# Patient Record
Sex: FEMALE | Race: WHITE | Employment: FULL TIME | URBAN - METROPOLITAN AREA
[De-identification: names, ages, dates, MRNs, and addresses within clinical notes are randomized per-mention and may not be internally consistent; named-entity substitution may affect disease eponyms.]

---

## 2017-10-26 ENCOUNTER — OFFICE VISIT (OUTPATIENT)
Dept: INTERNAL MEDICINE CLINIC | Age: 27
End: 2017-10-26

## 2017-10-26 DIAGNOSIS — M72.2 PLANTAR FASCIITIS, LEFT: ICD-10-CM

## 2017-10-26 DIAGNOSIS — M79.671 BILATERAL FOOT PAIN: Primary | ICD-10-CM

## 2017-10-26 DIAGNOSIS — M79.89 LEG SWELLING: ICD-10-CM

## 2017-10-26 DIAGNOSIS — M79.672 BILATERAL FOOT PAIN: Primary | ICD-10-CM

## 2017-10-26 DIAGNOSIS — M72.2 PLANTAR FASCIITIS, RIGHT: ICD-10-CM

## 2017-10-26 RX ORDER — TRIAMCINOLONE ACETONIDE 40 MG/ML
40 INJECTION, SUSPENSION INTRA-ARTICULAR; INTRAMUSCULAR ONCE
Qty: 1 ML | Refills: 0
Start: 2017-10-26 | End: 2017-10-26

## 2017-10-26 NOTE — MR AVS SNAPSHOT
Visit Information Date & Time Provider Department Dept. Phone Encounter #  
 10/26/2017  2:45 PM [facility] MD Bo [facility] [phone] [id number] Upcoming Health Maintenance Date Due  
 HPV AGE 9Y-34Y (1 of 3 - Female 3 Dose Series) 3/30/2003 DTaP/Tdap/Td series (1 - Tdap) 3/30/2013 PAP AKA CERVICAL CYTOLOGY 3/30/2013 INFLUENZA AGE 9 TO ADULT 8/1/2017 Allergies as of 10/26/2017  Review Complete On: 10/26/2017 By: [facility] MD Bo  
  
 Severity Noted Reaction Type Reactions Codeine Medium 10/26/2017    Other (comments) Tachycardia Sulfa (Sulfonamide Antibiotics) Medium 10/26/2017    Itching Bactrim [Sulfamethoprim Ds]  04/25/2011   Intolerance Itching Chocolate Flavor  07/19/2012    Other (comments) Pt reports migraines Clindamycin  05/17/2012    Other (comments) Chest tightness Compazine [Prochlorperazine Edisylate]  04/25/2011   Intolerance Other (comments) Neck and jaw spasm  And difficultly swallow Peanut  02/28/2013    Other (comments)  
 migraines Reglan [Metoclopramide]  04/25/2011   Intolerance Nausea and Vomiting, Vertigo Sleep disorder and shaking Zofran [Ondansetron Hcl (Pf)]  04/21/2015    Hives Current Immunizations  Reviewed on 7/19/2012 No immunizations on file. Not reviewed this visit You Were Diagnosed With   
  
 Codes Comments Bilateral foot pain    -  Primary ICD-10-CM: M79.671, T00.111 ICD-9-CM: 729.5 Leg swelling     ICD-10-CM: M79.89 ICD-9-CM: 729.81 Plantar fasciitis, left     ICD-10-CM: M72.2 ICD-9-CM: 728.71 Plantar fasciitis, right     ICD-10-CM: M72.2 ICD-9-CM: 728.71 Vitals OB Status Smoking Status Implant Never Smoker Preferred Pharmacy Pharmacy Name Phone [facility] [address], [address], [address] [phone] Your Updated Medication List  
  
   
 This list is accurate as of: 10/26/17  3:46 PM.  Always use your most recent med list.  
  
  
  
  
 ALEVE 220 mg tablet Generic drug:  naproxen sodium Take 220 mg by mouth. 500 MG PRN for back pain CRYSELLE (28) 0.3-30 mg-mcg Tab Generic drug:  norgestrel-ethinyl estradiol Diclofenac Potassium 50 mg Pwpk Commonly known as:  CAMBIA Take 50 mg by mouth as needed for Pain (migraine) for up to 4 doses. oxyCODONE-acetaminophen 5-325 mg per tablet Commonly known as:  PERCOCET Take 1 Tab by mouth every six (6) hours as needed for Pain. Max Daily Amount: 4 Tabs. * promethazine 25 mg tablet Commonly known as:  PHENERGAN Take 1 Tab by mouth every six (6) hours as needed. * promethazine 25 mg tablet Commonly known as:  PHENERGAN Take 1 Tab by mouth every six (6) hours as needed. tiZANidine 4 mg tablet Commonly known as:  Yane Muscat Take 4 mg by mouth every six (6) hours as needed. 1-2 tabs QHS prn for back  pain  
  
 traMADol 50 mg tablet Commonly known as:  ULTRAM  
Take 1 Tab by mouth every six (6) hours as needed for Pain. Max Daily Amount: 200 mg.  
  
 triamcinolone acetonide 40 mg/mL injection Commonly known as:  KENALOG  
1 mL by IntraMUSCular route once for 1 dose. * Notice: This list has 2 medication(s) that are the same as other medications prescribed for you. Read the directions carefully, and ask your doctor or other care provider to review them with you. We Performed the Following AMB POC US, SONO GUIDE NEEDLE [id number] CPT(R)] NC INJECT TENDON ORIGIN/INSERT [id number] CPT(R)] REFERRAL TO PHYSICAL THERAPY [[facility] [employer]] TRIAMCINOLONE ACETONIDE INJ [ [facility] [place]] To-Do List   
 10/26/2017 Imaging:  DUPLEX LOWER EXT VENOUS BILAT   
  
 10/26/2017 Imaging:  XR FOOT LT MIN 3 V   
  
 10/26/2017 Imaging:  XR FOOT RT MIN 3 V Referral Information Referral ID Referred By Referred To [id number]   
   [address] [facility], [address] Phone: [phone] Fax: [phone] Visits Status Start Date End Date  
 20 New Request 10/26/17 10/26/18 If your referral has a status of pending review or denied, additional information will be sent to support the outcome of this decision. Introducing [facility] HE[facility]! Mark Prabhakar introduces [facility] patient portal. Now you can access parts of your medical record, email your doctor's office, and request medication refills online. 1. In your internet browser, go to [email or web address] 2. Click on the First Time User? Click Here link in the Sign In box. You will see the New Member Sign Up page. 3. Enter your [facility] Access Code exactly as it appears below. You will not need to use this code after youve completed the sign-up process. If you do not sign up before the expiration date, you must request a new code. · [facility] Access Code: [email or web address] Expires: 1/24/2018  3:46 PM 
 
4. Enter the last four digits of your Social Security Number (xxxx) and Date of Birth (mm/dd/yyyy) as indicated and click Submit. You will be taken to the next sign-up page. 5. Create a [facility] ID. This will be your [facility] login ID and cannot be changed, so think of one that is secure and easy to remember. 6. Create a [facility] password. You can change your password at any time. 7. Enter your Password Reset Question and Answer. This can be used at a later time if you forget your password. 8. Enter your e-mail address. You will receive e-mail notification when new information is available in [address] 9. Click Sign Up. You can now view and download portions of your medical record. 10. Click the Download Summary menu link to download a portable copy of your medical information.  
 
If you have questions, please visit the Frequently Asked Questions section of the [employer]. Remember, [employer] is NOT to be used for urgent needs. For medical emergencies, dial 911. Now available from your iPhone and Android! Please provide this summary of care documentation to your next provider. Your primary care clinician is listed as Nikunj Piedra. If you have any questions after today's visit, please call [phone].

## 2017-10-26 NOTE — PROGRESS NOTES
Chief Complaint   Patient presents with    Foot Pain     bilateral foot px for two months     she is a 24y.o. year old female who presents for evaluation of Bilateral foot px  Pain Assessment Encounter      Jameel Garcia  10/26/2017  Onset of Symptoms: 2 months  ________________________________________________________________________  Description:sharp    Frequency: twice a day  Pain Scale:(1-10): 9  Trauma Hx: None  Hx of similar symptoms: No:   Radiation: None  Duration:  continuous      Progression: is unchanged  What makes it better?: Nothing  What makes it worse?:WALKING   Medications tried: ibuprofen    Reviewed and agree with Nurse Note and duplicated in this note. Reviewed PmHx, RxHx, FmHx, SocHx, AllgHx and updated and dated in the chart. Family History   Problem Relation Age of Onset    Hypertension Father     Diabetes Maternal Grandmother     Coronary Artery Disease Maternal Grandfather     Diabetes Maternal Grandfather     Diabetes Paternal Grandfather        Past Medical History:   Diagnosis Date    Headache(784.0)     Seizures ([address]       Social History     Social History    Marital status: SINGLE     Spouse name: N/A    Number of children: N/A    Years of education: N/A     Social History Main Topics    Smoking status: Never Smoker    Smokeless tobacco: Never Used    Alcohol use No    Drug use: No    Sexual activity: Not Currently     Other Topics Concern    Not on file     Social History Narrative        Review of Systems - negative except as listed above      Objective: There were no vitals filed for this visit. Physical Examination: General appearance - alert, well appearing, and in no distress  Back exam - full range of motion, no tenderness, palpable spasm or pain on motion  Neurological - alert, oriented, normal speech, no focal findings or movement disorder noted  Musculoskeletal - A bilateral ankle exam was performed.   Swelling: none  Warmth: no warmth  Tenderness: severe at PF origin    Palpation:  ATFL:  non-tender  CFL:  non-tender  PTFL:  non-tender  Syndesmosis Ligament:  non-tender  Deltoid ligament:  non-tender  Posterior Tibialis Tendon:  non-tender  Peroneal Tendon: non-tender  Achilles Tendon:  tender     Proximal Fibula:  non-tender  Proximal Tibia:  non-tender  Distal Fibula:  non-tender  Distal Tibia:  non-tender    Plantar Fascia: tender  Midfoot:  non-tender  Forefoot:  non-tender    ROM:  Plantar Flexion:  normal  Dorsiflexion:  normal    Squeeze Test: negative  Talar Tilt Test:  negative  Anterior Drawer:negative    Kleiger Test:  negative  Hop Test: negative  Adak: negative      Extremities - peripheral pulses normal, no pedal edema, no clubbing or cyanosis  Skin - normal coloration and turgor, no rashes, no suspicious skin lesions noted  Time Out taken at:  3:49 PM  10/26/2017    * Patient was identified by name and date of birth   * Agreement on procedure being performed was verified  * Risks and Benefits explained to the patient  * Procedure site verified and marked as necessary  * Patient was positioned for comfort  * Consent was signed and verified   In the presence of: Witness: ROXANA Pradhan  Injection #: 1  Needle:  25 gauge  Procedure: This procedure was discussed with Mitra Serrano and other therapeutic options were considered (risks vs benefits). Mitra Serrano and I thought that an injection was merited. After informed consent was obtained, landmarks were identified(marked), and the right ligament  was cleansed with ChlorPrep in the standard sterile manner. 2mL  1% lidocaine  and  .5 mL Kenalog  was then injected and needle tenotomy was not performed. Procedure performed with ultrasound needle guidance. The needle was then withdrawn. T he procedure was well tolerated. The patient is asked to continue to rest the area for a few more days before resuming regular activities.   It may be more painful for the first 1-2 days. NSAIDS are to be avoided. Watch for fever, or increased swelling or persistent pain in the joint. Call or return to clinic prn if such symptoms occur or there is failure to improve as anticipated. The procedure did provide relief of symptoms in the clinic. RTC in 4 weeks for reevaluation and possible reinjection. Given the patient's body habitus and the anatomically deep nature of this structure, sonographic guidance is recommended to prevent injury to neurovascular structures and confirm accuracy of injection. Furthermore, this patient has failed conservative treatment with physical therapy and modalities and the diagnostic and therapeutic accuracy is important. Assessment/ Plan:   Diagnoses and all orders for this visit:    1. Bilateral foot pain  -     XR FOOT LT MIN 3 V; Future  -     XR FOOT RT MIN 3 V; Future  -     MRI FOOT LT WO CONT; Future  -     MRI FOOT RT WO CONT; Future    2. Leg swelling  -     DUPLEX LOWER EXT VENOUS BILAT; Future    3. Plantar fasciitis, left  -     REFERRAL TO PHYSICAL THERAPY  -     TRIAMCINOLONE ACETONIDE INJ  -     triamcinolone acetonide (KENALOG) 40 mg/mL injection; 1 mL by IntraMUSCular route once for 1 dose.   -     20551 - INJECT TENDON ORIGIN/INSERT  -     AMB POC US, SONO GUIDE NEEDLE    4. Plantar fasciitis, right  -     REFERRAL TO PHYSICAL THERAPY    MRI to rule out stress fracture of the calcaneus    Pathophysiology, recovery and rehabilitation process discussed and questions answered   Counseling for 30 Minutes of the total visit duration   Pictures and figures used as necessary   Provided reassurance   Monitor response to injection   Discussed steroid side effects of fat atrophy, hypopigmentation, steroid flare or infection   Recommend  lower impact activities-walking, Eliptical, Nordic Track, cycling or swimming   Follow up in 4 week(s)  Xray's reviewed - within normal limits     :  1) Remember to stay active and/or exercise regularly (I suggest 30-45 minutes daily)   2) For reliable dietary information, go to www. EATRIGHT.org. You may wish to consider seeing the nutritionist at [facility] [phone], also consider the [address] I have discussed the diagnosis with the patient and the intended plan as seen in the above orders. The patient has received an after-visit summary and questions were answered concerning future plans. Medication Side Effects and Warnings were discussed with patient: yes  Patient Labs were reviewed and or requested: yes  Patient Past Records were reviewed and or requested  yes  I have discussed the diagnosis with the patient and the intended plan as seen in the above orders. The patient has received an after-visit summary and questions were answered concerning future plans. Pt agrees to call or return to clinic and/or go to closest ER with any worsening of symptoms. This may include, but not limited to increased fever (>100.4) with NSAIDS or Tylenol, increased edema, confusion, rash, worsening of presenting symptoms.

## 2017-10-30 ENCOUNTER — OFFICE VISIT (OUTPATIENT)
Dept: INTERNAL MEDICINE CLINIC | Age: 27
End: 2017-10-30

## 2017-10-30 VITALS
HEIGHT: 66 IN | BODY MASS INDEX: 36.71 KG/M2 | WEIGHT: 228.4 LBS | DIASTOLIC BLOOD PRESSURE: 89 MMHG | SYSTOLIC BLOOD PRESSURE: 132 MMHG | OXYGEN SATURATION: 97 % | HEART RATE: 95 BPM | RESPIRATION RATE: 16 BRPM | TEMPERATURE: 98.4 F

## 2017-10-30 DIAGNOSIS — M72.2 PLANTAR FASCIITIS, RIGHT: Primary | ICD-10-CM

## 2017-10-30 RX ORDER — MELOXICAM 15 MG/1
15 TABLET ORAL DAILY
Qty: 15 TAB | Refills: 0 | Status: SHIPPED | OUTPATIENT
Start: 2017-10-30 | End: 2018-07-08

## 2017-10-30 NOTE — PROGRESS NOTES
Chief Complaint   Patient presents with    Foot Pain     she is a 24y.o. year old female who presents for follow up of injury. Follow Up Pain Assessment Encounter      Onset of Symptoms: 5 days  ________________________________________________________________________  Description: Pain is moderately worse      Pain Scale:(1-10): 9  Duration:  continuous  What makes it better?: rest  What makes it worse?:walking  Medications tried: None  Modalities tried: None        Reviewed and agree with Nurse Note and duplicated in this note. Reviewed PmHx, RxHx, FmHx, SocHx, AllgHx and updated and dated in the chart. Family History   Problem Relation Age of Onset    Hypertension Father     Diabetes Maternal Grandmother     Coronary Artery Disease Maternal Grandfather     Diabetes Maternal Grandfather     Diabetes Paternal Grandfather        Past Medical History:   Diagnosis Date    Headache(784.0)     Seizures ([address]       Social History     Social History    Marital status: SINGLE     Spouse name: N/A    Number of children: N/A    Years of education: N/A     Social History Main Topics    Smoking status: Never Smoker    Smokeless tobacco: Never Used    Alcohol use No    Drug use: No    Sexual activity: Not Currently     Other Topics Concern    None     Social History Narrative        Review of Systems - negative except as listed above      Objective:     Vitals:    10/30/17 1619   BP: 132/89   Pulse: 95   Resp: 16   Temp: 98.4 °F (36.9 °C)   TempSrc: Oral   SpO2: 97%   Weight: 228 lb 6.4 oz (103.6 kg)   Height: 5' 6\" (1.676 m)       Physical Examination: General appearance - alert, well appearing, and in no distress  Back exam - full range of motion, no tenderness, palpable spasm or pain on motion  Neurological - alert, oriented, normal speech, no focal findings or movement disorder noted  Musculoskeletal - A bilateral ankle exam was performed.   Swelling: none  Warmth: no warmth  Tenderness: severe at PF origin    Palpation:  ATFL:  non-tender  CFL:  non-tender  PTFL:  non-tender  Syndesmosis Ligament:  non-tender  Deltoid ligament:  non-tender  Posterior Tibialis Tendon:  non-tender  Peroneal Tendon: non-tender  Achilles Tendon:  tender     Proximal Fibula:  non-tender  Proximal Tibia:  non-tender  Distal Fibula:  non-tender  Distal Tibia:  non-tender    Plantar Fascia: tender  Midfoot:  non-tender  Forefoot:  non-tender    ROM:  Plantar Flexion:  normal  Dorsiflexion:  normal    Squeeze Test: negative  Talar Tilt Test:  negative  Anterior Drawer:negative    Kleiger Test:  negative  Hop Test: negative  Canyon: negative  No ecchymosis, erythema or swelling noted on exam of left heel    Extremities - peripheral pulses normal, no pedal edema, no clubbing or cyanosis  Skin - normal coloration and turgor, no rashes, no suspicious skin lesions noted    Assessment/ Plan:   Diagnoses and all orders for this visit:    1. Plantar fasciitis, right    Other orders  -     meloxicam (MOBIC) 15 mg tablet; Take 1 Tab by mouth daily. Patient put on crutches until her MRI on Wednesday  Unusual reaction to the steroid shot given her amount of pain. Follow-up Disposition:  Return if symptoms worsen or fail to improve. Pathophysiology, recovery and rehabilitation process discussed and questions answered   Counseling for 30 Minutes of the total visit duration   Pictures and figures used as necessary   Provided reassurance   Monitor response to Physical Therapy   Recommend activity modification   Recommend  lower impact activities-walking, Eliptical, Nordic Track, cycling or swimming   Follow up in 4 week(s)      1) Remember to stay active and/or exercise regularly (I suggest 30-45 minutes daily)   2) For reliable dietary information, go to www. EATRIGHT.org. You may wish to consider seeing the nutritionist at [facility] [phone], also consider the [address]   I have discussed the diagnosis with the patient and the intended plan as seen in the above orders. The patient has received an after-visit summary and questions were answered concerning future plans. Medication Side Effects and Warnings were discussed with patient: yes  Patient Labs were reviewed and or requested: yes  Patient Past Records were reviewed and or requested  yes  I have discussed the diagnosis with the patient and the intended plan as seen in the above orders. The patient has received an after-visit summary and questions were answered concerning future plans. Pt agrees to call or return to clinic and/or go to closest ER with any worsening of symptoms. This may include, but not limited to increased fever (>100.4) with NSAIDS or Tylenol, increased edema, confusion, rash, worsening of presenting symptoms.

## 2017-10-30 NOTE — MR AVS SNAPSHOT
Visit Information Date & Time Provider Department Dept. Phone Encounter #  
 10/30/2017  4:30 PM Kait Wilburn MD [facility] [place] [address] [id number] Follow-up Instructions Return if symptoms worsen or fail to improve. Follow-up and Disposition History Upcoming Health Maintenance Date Due  
 HPV AGE 9Y-34Y (1 of 3 - Female 3 Dose Series) 3/30/2003 DTaP/Tdap/Td series (1 - Tdap) 3/30/2013 PAP AKA CERVICAL CYTOLOGY 3/30/2013 INFLUENZA AGE 9 TO ADULT 8/1/2017 Allergies as of 10/30/2017  Review Complete On: 10/30/2017 By: Kait Wilburn MD  
  
 Severity Noted Reaction Type Reactions Codeine Medium 10/26/2017    Other (comments) Tachycardia Sulfa (Sulfonamide Antibiotics) Medium 10/26/2017    Itching Bactrim [Sulfamethoprim Ds]  04/25/2011   Intolerance Itching Chocolate Flavor  07/19/2012    Other (comments) Pt reports migraines Clindamycin  05/17/2012    Other (comments) Chest tightness Compazine [Prochlorperazine Edisylate]  04/25/2011   Intolerance Other (comments) Neck and jaw spasm  And difficultly swallow Morphine  06/19/2017    Hives Peanut  02/28/2013    Other (comments)  
 migraines Reglan [Metoclopramide]  04/25/2011   Intolerance Nausea and Vomiting, Vertigo Sleep disorder and shaking Sulfur  06/19/2017    Swelling Throat closes. Zofran [Ondansetron Hcl (Pf)]  04/21/2015    Hives Metaxalone Low 06/19/2017    Palpitations Current Immunizations  Reviewed on 7/19/2012 No immunizations on file. Not reviewed this visit You Were Diagnosed With   
  
 Codes Comments Plantar fasciitis, right    -  Primary ICD-10-CM: M72.2 ICD-9-CM: 728.71 Vitals BP Pulse Temp Resp Height(growth percentile) Weight(growth percentile) 132/89 (BP 1 Location: Left arm, BP Patient Position: Sitting) 95 98.4 °F (36.9 °C) (Oral) 16 5' 6\" (1.676 m) 228 lb 6.4 oz (103.6 kg) SpO2 BMI OB Status Smoking Status 97% 36.86 kg/m2 Implant Never Smoker Vitals History BMI and BSA Data Body Mass Index Body Surface Area  
 36.86 kg/m 2 2.2 m 2 Preferred Pharmacy Pharmacy Name Phone [facility] [address], [address] [place] [address] [phone] Your Updated Medication List  
  
   
This list is accurate as of: 10/30/17  4:49 PM.  Always use your most recent med list.  
  
  
  
  
 ALEVE 220 mg tablet Generic drug:  naproxen sodium Take 220 mg by mouth. 500 MG PRN for back pain CRYSELLE (28) 0.3-30 mg-mcg Tab Generic drug:  norgestrel-ethinyl estradiol Diclofenac Potassium 50 mg Pwpk Commonly known as:  CAMBIA Take 50 mg by mouth as needed for Pain (migraine) for up to 4 doses. meloxicam 15 mg tablet Commonly known as:  MOBIC Take 1 Tab by mouth daily. NEXPLANON 68 mg Impl Generic drug:  etonogestrel  
by SubDERmal route. oxyCODONE-acetaminophen 5-325 mg per tablet Commonly known as:  PERCOCET Take 1 Tab by mouth every six (6) hours as needed for Pain. Max Daily Amount: 4 Tabs. promethazine 25 mg tablet Commonly known as:  PHENERGAN Take 1 Tab by mouth every six (6) hours as needed. tiZANidine 4 mg tablet Commonly known as:  Elenore Piggs Take 4 mg by mouth every six (6) hours as needed. 1-2 tabs QHS prn for back  pain  
  
 traMADol 50 mg tablet Commonly known as:  ULTRAM  
Take 1 Tab by mouth every six (6) hours as needed for Pain. Max Daily Amount: 200 mg. Prescriptions Sent to Pharmacy Refills  
 meloxicam (MOBIC) 15 mg tablet 0 Sig: Take 1 Tab by mouth daily. Class: Normal  
 Pharmacy: [address], [address] [place] [address] #: [phone] Route: Oral  
  
Follow-up Instructions Return if symptoms worsen or fail to improve.   
  
To-Do List   
 11/01/2017 6:45 AM  
 Appointment with [facility] MRI 1 at [facility] ([phone]) 1. Please bring a list or a bag of your current medications to your appointment 2. Please be sure to remove ALL hair clips, pins, extensions, etc., prior to arriving for your MRI procedure. 3. If you have any medical implants or devices, please bring associated medical card with you. 4. Bring any non [facility] films or CDs pertaining to the area being imaged with you on the day of appointment. 5. A written order with a valid diagnosis and Physicians  signature is required for all scheduled tests. 6. Check in at registration 30min before your appointment time unless you were instructed to do otherwise. 11/01/2017 7:15 AM  
  Appointment with [facility] MRI 1 at [facility] ([phone]) 1. Please bring a list or a bag of your current medications to your appointment 2. Please be sure to remove ALL hair clips, pins, extensions, etc., prior to arriving for your MRI procedure. 3. If you have any medical implants or devices, please bring associated medical card with you. 4. Bring any non [facility] films or CDs pertaining to the area being imaged with you on the day of appointment. 5. A written order with a valid diagnosis and Physicians  signature is required for all scheduled tests. 6. Check in at registration 30min before your appointment time unless you were instructed to do otherwise. 11/04/2017 9:30 AM  
  Appointment with [facility] ROOM 1 [facility] at [facility] V[facility]ULAR [facility] ([phone]) No Prep  GENERAL INSTRUCTIONS 1. Bring any non [facility] facility films/reports pertaining to the area being studied with you on the day of appointment. 2. A written order with a valid diagnosis and Physicians signature is required for all scheduled tests. 3. Check in at registration 30 minutes before your appointment time unless you were instructed to do otherwise. Introducing [facility]! Vargas Brewer introduces [employer] patient portal. Now you can access parts of your medical record, email your doctor's office, and request medication refills online. 1. In your internet browser, go to [email or web address] 2. Click on the First Time User? Click Here link in the Sign In box. You will see the New Member Sign Up page. 3. Enter your [employer] Access Code exactly as it appears below. You will not need to use this code after youve completed the sign-up process. If you do not sign up before the expiration date, you must request a new code. · [employer] Access Code: [email or web address] Expires: 1/24/2018  3:46 PM 
 
4. Enter the last four digits of your Social Security Number (xxxx) and Date of Birth (mm/dd/yyyy) as indicated and click Submit. You will be taken to the next sign-up page. 5. Create a [employer] ID. This will be your [employer] login ID and cannot be changed, so think of one that is secure and easy to remember. 6. Create a [employer] password. You can change your password at any time. 7. Enter your Password Reset Question and Answer. This can be used at a later time if you forget your password. 8. Enter your e-mail address. You will receive e-mail notification when new information is available in [address] 9. Click Sign Up. You can now view and download portions of your medical record. 10. Click the Download Summary menu link to download a portable copy of your medical information. If you have questions, please visit the Frequently Asked Questions section of the [employer] website. Remember, [employer] is NOT to be used for urgent needs. For medical emergencies, dial 911. Now available from your iPhone and Android! Please provide this summary of care documentation to your next provider. Your primary care clinician is listed as Kyler Banks. If you have any questions after today's visit, please call [phone].

## 2017-11-01 ENCOUNTER — TELEPHONE (OUTPATIENT)
Dept: INTERNAL MEDICINE CLINIC | Age: 27
End: 2017-11-01

## 2017-11-01 ENCOUNTER — HOSPITAL ENCOUNTER (OUTPATIENT)
Dept: MRI IMAGING | Age: 27
Discharge: HOME | End: 2017-11-01
Attending: FAMILY MEDICINE
Payer: COMMERCIAL

## 2017-11-01 DIAGNOSIS — M79.671 BILATERAL FOOT PAIN: ICD-10-CM

## 2017-11-01 DIAGNOSIS — M79.672 BILATERAL FOOT PAIN: ICD-10-CM

## 2017-11-01 PROCEDURE — 73718 MRI LOWER EXTREMITY W/O DYE: CPT

## 2017-11-02 NOTE — PROGRESS NOTES
Pt notified of results. Finding of os trigonum is not where her pain is located. Normal results otherwise.   rb

## 2017-11-04 ENCOUNTER — HOSPITAL ENCOUNTER (OUTPATIENT)
Dept: VASCULAR SURGERY | Age: 27
Discharge: HOME | End: 2017-11-04
Attending: FAMILY MEDICINE
Payer: COMMERCIAL

## 2017-11-04 DIAGNOSIS — M79.89 LEG SWELLING: ICD-10-CM

## 2017-11-04 PROCEDURE — 93970 EXTREMITY STUDY: CPT

## 2017-11-04 NOTE — PROCEDURES
[address]  *** FINAL REPORT ***    Name: Emilie Perales  MRN: [id number]    Outpatient  : 30 Mar 1990  HIS Order #: [id number]  [address] [facility] Visit #: [id number]  Date: 2017    TYPE OF TEST: Peripheral Venous Testing    REASON FOR TEST  Limb swelling    Right Leg:-  Deep venous thrombosis:           No  Superficial venous thrombosis:    No  Deep venous insufficiency:        No  Superficial venous insufficiency: No    Left Leg:-  Deep venous thrombosis:           No  Superficial venous thrombosis:    No  Deep venous insufficiency:        No  Superficial venous insufficiency: No      INTERPRETATION/FINDINGS  PROCEDURE:  BILATERAL LE VENOUS DUPLEX. Evaluation of lower extremity veins with ultrasound (B-mode imaging,  pulsed Doppler, color Doppler). Includes the common femoral, deep  femoral, femoral, popliteal, posterior tibial, peroneal, and great  saphenous veins. FINDINGS:  Miguel A Cower scale and color flow duplex images of the veins in  both lower extremities demonstrate normal compressibility, spontaneous   and augmented flow profiles, and absence of filling defects  throughout the deep and superficial veins in both lower extremities. CONCLUSION:  Bilateral lower extremity venous duplex negative for deep   venous thrombosis or thrombophlebitis. ADDITIONAL COMMENTS    I have personally reviewed the data relevant to the interpretation of  this  study. TECHNOLOGIST: DENIS Sanchez  Signed: 2017 10:06 AM    PHYSICIAN: Amanda Callahan.  Howard Haddad MD  Signed: 2017 09:21 AM

## 2018-04-27 ENCOUNTER — HOSPITAL ENCOUNTER (EMERGENCY)
Age: 28
Discharge: HOME | End: 2018-04-27
Attending: EMERGENCY MEDICINE
Payer: COMMERCIAL

## 2018-04-27 ENCOUNTER — OFFICE VISIT (OUTPATIENT)
Dept: NEUROLOGY | Age: 28
End: 2018-04-27

## 2018-04-27 ENCOUNTER — APPOINTMENT (OUTPATIENT)
Dept: CT IMAGING | Age: 28
End: 2018-04-27
Attending: PHYSICIAN ASSISTANT
Payer: COMMERCIAL

## 2018-04-27 VITALS
SYSTOLIC BLOOD PRESSURE: 144 MMHG | HEART RATE: 89 BPM | RESPIRATION RATE: 16 BRPM | HEIGHT: 67 IN | WEIGHT: 246.91 LBS | TEMPERATURE: 98.2 F | BODY MASS INDEX: 38.75 KG/M2 | DIASTOLIC BLOOD PRESSURE: 96 MMHG | OXYGEN SATURATION: 96 %

## 2018-04-27 VITALS
SYSTOLIC BLOOD PRESSURE: 140 MMHG | WEIGHT: 251 LBS | DIASTOLIC BLOOD PRESSURE: 90 MMHG | HEART RATE: 100 BPM | BODY MASS INDEX: 39.39 KG/M2 | RESPIRATION RATE: 14 BRPM | HEIGHT: 67 IN | OXYGEN SATURATION: 99 %

## 2018-04-27 DIAGNOSIS — G43.809 OTHER MIGRAINE WITHOUT STATUS MIGRAINOSUS, NOT INTRACTABLE: Primary | ICD-10-CM

## 2018-04-27 DIAGNOSIS — G43.709 CHRONIC MIGRAINE WITHOUT AURA WITHOUT STATUS MIGRAINOSUS, NOT INTRACTABLE: Primary | ICD-10-CM

## 2018-04-27 LAB — HCG UR QL: NEGATIVE

## 2018-04-27 PROCEDURE — 81025 URINE PREGNANCY TEST: CPT

## 2018-04-27 PROCEDURE — 74011250636 HC RX REV CODE- 250/636: Performed by: PHYSICIAN ASSISTANT

## 2018-04-27 PROCEDURE — 96375 TX/PRO/DX INJ NEW DRUG ADDON: CPT

## 2018-04-27 PROCEDURE — 99285 EMERGENCY DEPT VISIT HI MDM: CPT

## 2018-04-27 PROCEDURE — 96361 HYDRATE IV INFUSION ADD-ON: CPT

## 2018-04-27 PROCEDURE — 74011250637 HC RX REV CODE- 250/637: Performed by: PHYSICIAN ASSISTANT

## 2018-04-27 PROCEDURE — 96365 THER/PROPH/DIAG IV INF INIT: CPT

## 2018-04-27 PROCEDURE — 70450 CT HEAD/BRAIN W/O DYE: CPT

## 2018-04-27 PROCEDURE — 74011250636 HC RX REV CODE- 250/636: Performed by: EMERGENCY MEDICINE

## 2018-04-27 RX ORDER — PROMETHAZINE HYDROCHLORIDE 25 MG/1
25 TABLET ORAL
Qty: 12 TAB | Refills: 0 | Status: SHIPPED | OUTPATIENT
Start: 2018-04-27 | End: 2018-07-08

## 2018-04-27 RX ORDER — DROPERIDOL 2.5 MG/ML
1.25 INJECTION, SOLUTION INTRAMUSCULAR; INTRAVENOUS ONCE
Status: DISCONTINUED | OUTPATIENT
Start: 2018-04-27 | End: 2018-04-27

## 2018-04-27 RX ORDER — DIAZEPAM 5 MG/1
5 TABLET ORAL ONCE
Status: COMPLETED | OUTPATIENT
Start: 2018-04-27 | End: 2018-04-27

## 2018-04-27 RX ORDER — KETOROLAC TROMETHAMINE 30 MG/ML
30 INJECTION, SOLUTION INTRAMUSCULAR; INTRAVENOUS
Status: COMPLETED | OUTPATIENT
Start: 2018-04-27 | End: 2018-04-27

## 2018-04-27 RX ORDER — PROMETHAZINE HYDROCHLORIDE 25 MG/1
25 SUPPOSITORY RECTAL
Qty: 12 SUPPOSITORY | Refills: 0 | Status: SHIPPED | OUTPATIENT
Start: 2018-04-27 | End: 2018-05-04

## 2018-04-27 RX ORDER — KETOROLAC TROMETHAMINE 10 MG/1
10 TABLET, FILM COATED ORAL
Qty: 15 TAB | Refills: 1 | Status: SHIPPED | OUTPATIENT
Start: 2018-04-27 | End: 2018-07-08

## 2018-04-27 RX ORDER — DIPHENHYDRAMINE HYDROCHLORIDE 50 MG/ML
25 INJECTION, SOLUTION INTRAMUSCULAR; INTRAVENOUS
Status: COMPLETED | OUTPATIENT
Start: 2018-04-27 | End: 2018-04-27

## 2018-04-27 RX ORDER — DEXAMETHASONE SODIUM PHOSPHATE 10 MG/ML
10 INJECTION INTRAMUSCULAR; INTRAVENOUS
Status: COMPLETED | OUTPATIENT
Start: 2018-04-27 | End: 2018-04-27

## 2018-04-27 RX ORDER — FLUOXETINE HYDROCHLORIDE 20 MG/1
20 CAPSULE ORAL
COMMUNITY

## 2018-04-27 RX ORDER — MAGNESIUM SULFATE HEPTAHYDRATE 40 MG/ML
2 INJECTION, SOLUTION INTRAVENOUS ONCE
Status: COMPLETED | OUTPATIENT
Start: 2018-04-27 | End: 2018-04-27

## 2018-04-27 RX ORDER — DIAZEPAM 10 MG/2ML
5 INJECTION INTRAMUSCULAR
Status: DISCONTINUED | OUTPATIENT
Start: 2018-04-27 | End: 2018-04-27 | Stop reason: CLARIF

## 2018-04-27 RX ORDER — DIHYDROERGOTAMINE MESYLATE 1 MG/ML
0.5 INJECTION, SOLUTION INTRAMUSCULAR; INTRAVENOUS; SUBCUTANEOUS ONCE
Status: COMPLETED | OUTPATIENT
Start: 2018-04-27 | End: 2018-04-27

## 2018-04-27 RX ADMIN — PROMETHAZINE HYDROCHLORIDE 12.5 MG: 25 INJECTION INTRAMUSCULAR; INTRAVENOUS at 09:25

## 2018-04-27 RX ADMIN — DEXAMETHASONE SODIUM PHOSPHATE 10 MG: 10 INJECTION, SOLUTION INTRAMUSCULAR; INTRAVENOUS at 08:58

## 2018-04-27 RX ADMIN — SODIUM CHLORIDE 1000 ML: 900 INJECTION, SOLUTION INTRAVENOUS at 08:49

## 2018-04-27 RX ADMIN — DIAZEPAM 5 MG: 5 TABLET ORAL at 10:34

## 2018-04-27 RX ADMIN — SODIUM CHLORIDE 1000 ML: 900 INJECTION, SOLUTION INTRAVENOUS at 11:39

## 2018-04-27 RX ADMIN — SODIUM CHLORIDE 1000 ML: 900 INJECTION, SOLUTION INTRAVENOUS at 10:03

## 2018-04-27 RX ADMIN — MAGNESIUM SULFATE HEPTAHYDRATE 2 G: 40 INJECTION, SOLUTION INTRAVENOUS at 10:04

## 2018-04-27 RX ADMIN — DIPHENHYDRAMINE HYDROCHLORIDE 25 MG: 50 INJECTION, SOLUTION INTRAMUSCULAR; INTRAVENOUS at 11:44

## 2018-04-27 RX ADMIN — DIHYDROERGOTAMINE MESYLATE 0.5 MG: 1 INJECTION, SOLUTION INTRAMUSCULAR; INTRAVENOUS; SUBCUTANEOUS at 11:25

## 2018-04-27 RX ADMIN — KETOROLAC TROMETHAMINE 30 MG: 30 INJECTION, SOLUTION INTRAMUSCULAR at 08:55

## 2018-04-27 NOTE — ED NOTES
The patient states no relief of headache pain. She appears to have stopped crying is is calmer at this time. Family remains at the bedside.

## 2018-04-27 NOTE — ED PROVIDER NOTES
HPI Comments: 34 y.o. female with past medical history significant for seizures and headaches who presents from home via private vehicle with chief complaint of headache. Pt reports a 5 day history of an intermittent headache, currently a 10/10 described as an \"ache\". Pt reports a history of migraines, notes this one has lasted longer than her usual, and has not been relieved by her normal migraine medications at home, including toradol, phenergan, valium, and demerol. Pt denies any exacerbating or relieving factors. Pt denies any facial asymmetry, ataxia, unilateral weakness, or speech difficulty. There are no other acute medical concerns at this time. Social hx: Nonsmoker; No EtOH use  PCP: Kacy Hawkins NP    Note written by Selina Rosario, as dictated by Jazmine Capps PA-C 8:36 AM      The history is provided by the patient. No  was used. Past Medical History:   Diagnosis Date    Headache(784.0)     Seizures ([address]        Past Surgical History:   Procedure Laterality Date    ENDOSCOPY, COLON, DIAGNOSTIC      HX APPENDECTOMY      HX CHOLECYSTECTOMY      HX GI      HX GYN           Family History:   Problem Relation Age of Onset    Hypertension Father     Diabetes Maternal Grandmother     Coronary Artery Disease Maternal Grandfather     Diabetes Maternal Grandfather     Diabetes Paternal Grandfather        Social History     Social History    Marital status: SINGLE     Spouse name: N/A    Number of children: N/A    Years of education: N/A     Occupational History    Not on file. Social History Main Topics    Smoking status: Never Smoker    Smokeless tobacco: Never Used    Alcohol use No    Drug use: No    Sexual activity: Not Currently     Other Topics Concern    Not on file     Social History Narrative         ALLERGIES: Codeine; Sulfa (sulfonamide antibiotics); Bactrim [sulfamethoprim ds];  Chocolate flavor; Clindamycin; Compazine [prochlorperazine edisylate]; Morphine; Peanut; Reglan [metoclopramide]; Sulfur; Zofran [ondansetron hcl (pf)]; and Metaxalone    Review of Systems   Constitutional: Negative for activity change, appetite change, diaphoresis and fever. HENT: Negative for ear discharge, ear pain, facial swelling, rhinorrhea, sore throat, tinnitus, trouble swallowing and voice change. Eyes: Negative for photophobia, pain, discharge, redness and visual disturbance. Respiratory: Negative for cough, chest tightness, shortness of breath, wheezing and stridor. Cardiovascular: Negative for chest pain and palpitations. Gastrointestinal: Negative for abdominal pain, constipation, diarrhea, nausea and vomiting. Endocrine: Negative for polydipsia and polyuria. Genitourinary: Negative for dysuria, flank pain and hematuria. Musculoskeletal: Negative for arthralgias, back pain and myalgias. Skin: Negative for color change and rash. Neurological: Positive for headaches. Negative for dizziness, syncope, facial asymmetry, speech difficulty, weakness, light-headedness and numbness. Psychiatric/Behavioral: Negative for behavioral problems. All other systems reviewed and are negative. Vitals:    04/27/18 0932 04/27/18 1000 04/27/18 1100 04/27/18 1133   BP:  116/76 137/71 124/89   Pulse:   76 91   Resp:       Temp:       SpO2: 98% 98% 91% 96%   Weight:       Height:                Physical Exam   Constitutional: She is oriented to person, place, and time. She appears well-developed and well-nourished. HENT:   Head: Normocephalic and atraumatic. Eyes: Conjunctivae are normal. Pupils are equal, round, and reactive to light. Right eye exhibits no discharge. Left eye exhibits no discharge. Neck: Normal range of motion. Neck supple. No thyromegaly present. Cardiovascular: Normal rate, regular rhythm and normal heart sounds. Exam reveals no gallop and no friction rub. No murmur heard.   Pulmonary/Chest: Effort normal and breath sounds normal. No respiratory distress. She has no wheezes. Abdominal: Soft. Bowel sounds are normal. She exhibits no distension. There is no tenderness. There is no rebound and no guarding. Musculoskeletal: Normal range of motion. Neurological: She is alert and oriented to person, place, and time. Skin: Skin is warm. Psychiatric: She has a normal mood and affect. MDM  Number of Diagnoses or Management Options  Other migraine without status migrainosus, not intractable:   Diagnosis management comments: Pt afebrile and nontoxic appearing. Vitals, labs, physical exam, and imaging studies all unremarkable. Pt reports improvement after headache medicines. Low index of suspicion for stroke, aneurysm, SAH, meningitis, PE, PTX, ACS, sepsis or any other acute life threatening condition. Will treat symptomatically and advise close follow up with neurology/family doctor. Reviewed treatment plan with attending and they agree.   Kirsten Claudio PA-C        ED Course       Procedures

## 2018-04-27 NOTE — LETTER
4/27/2018 3:40 PM 
 
Patient:  Nini Tan YOB: 1990 Date of Visit: 4/27/2018 Dear Taylor Ingram NP 
[address] [address] VIA Facsimile: [phone] 
 : 
 
 
Ms. Massimo Restrepo came in for an evaluation today regarding migraines. Below are the relevant portions of my assessment and plan of care. If you have questions, please do not hesitate to call me. I look forward to following Ms. English along with you. Sincerely, Aj Godinez MD

## 2018-04-27 NOTE — ED NOTES
The patient became nauseated and flushed following administration,VAERY Lei Boys to the bedside for evaluation. Third liter NS started as directed. Vital signs remain within normal parameters.

## 2018-04-27 NOTE — MR AVS SNAPSHOT
[facility] [address] [place] [address] 
[phone] Patient: Marysol Britton MRN: [id number] UTP:[id number] Visit Information Date & Time Provider Department Dept. Phone Encounter #  
 4/27/2018  3:00 PM Brian Galvan MD [facility] at [place] [id number] Follow-up Instructions Return in about 3 months (around 7/27/2018). Upcoming Health Maintenance Date Due  
 HPV Age 9Y-34Y (1 of 1 - Female 3 Dose Series) 3/30/2003 DTaP/Tdap/Td series (1 - Tdap) 3/30/2013 PAP AKA CERVICAL CYTOLOGY 3/30/2013 Influenza Age 5 to Adult 8/1/2018 Allergies as of 4/27/2018  Review Complete On: 4/27/2018 By: Brian Galvan MD  
  
 Severity Noted Reaction Type Reactions Codeine Medium 10/26/2017    Other (comments) Tachycardia Sulfa (Sulfonamide Antibiotics) Medium 10/26/2017    Itching Bactrim [Sulfamethoprim Ds]  04/25/2011   Intolerance Itching Chocolate Flavor  07/19/2012    Other (comments) Pt reports migraines Clindamycin  05/17/2012    Other (comments) Chest tightness Compazine [Prochlorperazine Edisylate]  04/25/2011   Intolerance Other (comments) Neck and jaw spasm  And difficultly swallow Morphine  06/19/2017    Hives Peanut  02/28/2013    Other (comments)  
 migraines Reglan [Metoclopramide]  04/25/2011   Intolerance Nausea and Vomiting, Vertigo Sleep disorder and shaking Sulfur  06/19/2017    Swelling Throat closes. Zofran [Ondansetron Hcl (Pf)]  04/21/2015    Hives Metaxalone Low 06/19/2017    Palpitations Current Immunizations  Reviewed on 7/19/2012 No immunizations on file. Not reviewed this visit You Were Diagnosed With   
  
 Codes Comments Chronic migraine without aura without status migrainosus, not intractable    -  Primary ICD-10-CM: K97.493 ICD-9-CM: 346.70 Vitals BP Pulse Resp Height(growth percentile) Weight(growth percentile) SpO2  
 140/90 100 14 5' 7\" (1.702 m) 251 lb (113.9 kg) 99% BMI OB Status Smoking Status 39.31 kg/m2 Implant Current Every Day Smoker Vitals History BMI and BSA Data Body Mass Index Body Surface Area  
 39.31 kg/m 2 2.32 m 2 Preferred Pharmacy Pharmacy Name Phone [facility] [address] [place] [address], [address] [place] [address], [address] [phone] Your Updated Medication List  
  
   
This list is accurate as of 4/27/18  3:13 PM.  Always use your most recent med list.  
  
  
  
  
 ALEVE 220 mg tablet Generic drug:  naproxen sodium Take 220 mg by mouth. 500 MG PRN for back pain CRYSELLE (28) 0.3-30 mg-mcg Tab Generic drug:  norgestrel-ethinyl estradiol Diclofenac Potassium 50 mg Pwpk Commonly known as:  CAMBIA Take 50 mg by mouth as needed for Pain (migraine) for up to 4 doses. ketorolac 10 mg tablet Commonly known as:  TORADOL Take 1 Tab by mouth every six (6) hours as needed for Pain.  
  
 meloxicam 15 mg tablet Commonly known as:  MOBIC Take 1 Tab by mouth daily. NEXPLANON 68 mg Impl Generic drug:  etonogestrel  
by SubDERmal route. oxyCODONE-acetaminophen 5-325 mg per tablet Commonly known as:  PERCOCET Take 1 Tab by mouth every six (6) hours as needed for Pain. Max Daily Amount: 4 Tabs. * promethazine 25 mg tablet Commonly known as:  PHENERGAN Take 1 Tab by mouth every six (6) hours as needed. * promethazine 25 mg suppository Commonly known as:  PHENERGAN Insert 1 Suppository into rectum every six (6) hours as needed for Nausea for up to 7 days. * promethazine 25 mg tablet Commonly known as:  PHENERGAN Take 1 Tab by mouth every six (6) hours as needed. PROzac 20 mg capsule Generic drug:  FLUoxetine Take  by mouth daily. * SUMAtriptan succinate 3 mg/0.5 mL Pnij Commonly known as:  Sherryle Kand  
 1 SC PRN, may repeat X1 in 2 hours * SUMAtriptan succinate 3 mg/0.5 mL Pnij Commonly known as:  Evalene Karma  
3 mg by SubCUTAneous route as needed. tiZANidine 4 mg tablet Commonly known as:  Elicia Core Take 4 mg by mouth every six (6) hours as needed. 1-2 tabs QHS prn for back  pain * topiramate ER 50 mg capsule Commonly known as:  TROKENDI XR Take 1 Cap by mouth daily. * topiramate  mg capsule Commonly known as:  TROKENDI XR Take 1 Cap by mouth daily. traMADol 50 mg tablet Commonly known as:  ULTRAM  
Take 1 Tab by mouth every six (6) hours as needed for Pain. Max Daily Amount: 200 mg.  
  
 * Notice: This list has 7 medication(s) that are the same as other medications prescribed for you. Read the directions carefully, and ask your doctor or other care provider to review them with you. Prescriptions Sent to Pharmacy Refills  
 ketorolac (TORADOL) 10 mg tablet 1 Sig: Take 1 Tab by mouth every six (6) hours as needed for Pain. Class: Normal  
 Pharmacy: [address] Ph #: [phone] Route: Oral  
 SUMAtriptan succinate (ZEMBRACE SYMTOUCH) 3 mg/0.5 mL pnij 3 Si SC PRN, may repeat X1 in 2 hours Class: Normal  
 Pharmacy: [address] Ph #: [phone] Follow-up Instructions Return in about 3 months (around 2018). Introducing [facility]! Dear Vanita Kothari: Thank you for requesting a [employer] account. Our records indicate that you already have an active [employer] account. You can access your account anytime at [email or web address] Did you know that you can access your hospital and ER discharge instructions at any time in [employer]? You can also review all of your test results from your hospital stay or ER visit. Additional Information If you have questions, please visit the Frequently Asked Questions section of the [employer] website at [email or web address] Remember, [employer] is NOT to be used for urgent needs. For medical emergencies, dial 911. Now available from your iPhone and Android! Please provide this summary of care documentation to your next provider. Your primary care clinician is listed as Danitza Aguilar. If you have any questions after today's visit, please call [phone].

## 2018-04-27 NOTE — ED TRIAGE NOTES
Patient presents with 5-day history of migraine that has not subsided with meds from PCP (Toradol, Phenergan, Valium, Demerol).

## 2018-04-27 NOTE — PROGRESS NOTES
No chief complaint on file. HISTORY OF PRESENT ILLNESS  Dasha Guaman is a 34 y.o. female with a long-standing history of migraine headaches. She used to see Dr. Mary Estrada several years ago and was on different abortive and prophylactic medications. She has tried different types of triptan's including Maxalt and injectable sumatriptan which did not really use to help. She was on topiramate which did not make much difference. Eventually her headaches have subsided and she was doing well for many years until a few months ago when they came back. She has been to her primary care physician's office multiple times to get a shot of Toradol, Phenergan and Demerol which will usually abort the headache. She again developed a bad headache and received this cocktail but it did not help. She was asked to go to the emergency department and she was there this morning. She was tried on DHE which reduced her headache but give her a panic attack. She has several different types of allergies and knows several triggers which she tries to avoid. She used to lose vision with her migraines in the past but no other neurological symptoms with her headaches recently. She describes her typical headache as a generalized, throbbing pain, associated with light sensitivity, sound sensitivity and nausea. Past Medical History:   Diagnosis Date    Headache     Headache(784.0)     Seizures (HCC)      Current Outpatient Prescriptions   Medication Sig    FLUoxetine (PROZAC) 20 mg capsule Take  by mouth daily.  promethazine (PHENERGAN) 25 mg suppository Insert 1 Suppository into rectum every six (6) hours as needed for Nausea for up to 7 days.  promethazine (PHENERGAN) 25 mg tablet Take 1 Tab by mouth every six (6) hours as needed.  ketorolac (TORADOL) 10 mg tablet Take 1 Tab by mouth every six (6) hours as needed for Pain.     SUMAtriptan succinate (ZEMBRACE SYMTOUCH) 3 mg/0.5 mL pnij 1 SC PRN, may repeat X1 in 2 hours    SUMAtriptan succinate (ZEMBRACE SYMTOUCH) 3 mg/0.5 mL pnij 3 mg by SubCUTAneous route as needed.  topiramate ER (TROKENDI XR) 50 mg capsule Take 1 Cap by mouth daily.  topiramate ER (TROKENDI XR) 100 mg capsule Take 1 Cap by mouth daily.  etonogestrel (NEXPLANON) 68 mg impl by SubDERmal route.  meloxicam (MOBIC) 15 mg tablet Take 1 Tab by mouth daily.  traMADol (ULTRAM) 50 mg tablet Take 1 Tab by mouth every six (6) hours as needed for Pain. Max Daily Amount: 200 mg.  promethazine (PHENERGAN) 25 mg tablet Take 1 Tab by mouth every six (6) hours as needed.  oxyCODONE-acetaminophen (PERCOCET) 5-325 mg per tablet Take 1 Tab by mouth every six (6) hours as needed for Pain. Max Daily Amount: 4 Tabs.  tiZANidine (ZANAFLEX) 4 mg tablet Take 4 mg by mouth every six (6) hours as needed. 1-2 tabs QHS prn for back  pain     naproxen sodium (ALEVE) 220 mg tablet Take 220 mg by mouth. 500 MG PRN for back pain     CRYSELLE, 28, 0.3-30 mg-mcg per tablet     Diclofenac Potassium (CAMBIA) 50 mg pwpk Take 50 mg by mouth as needed for Pain (migraine) for up to 4 doses. No current facility-administered medications for this visit. Allergies   Allergen Reactions    Codeine Other (comments)     Tachycardia    Sulfa (Sulfonamide Antibiotics) Itching    Bactrim [Sulfamethoprim Ds] Itching    Chocolate Flavor Other (comments)     Pt reports migraines    Clindamycin Other (comments)     Chest tightness    Compazine [Prochlorperazine Edisylate] Other (comments)     Neck and jaw spasm  And difficultly swallow     Morphine Hives    Peanut Other (comments)     migraines    Reglan [Metoclopramide] Nausea and Vomiting and Vertigo     Sleep disorder and shaking    Sulfur Swelling     Throat closes.     Zofran [Ondansetron Hcl (Pf)] Hives    Metaxalone Palpitations     Family History   Problem Relation Age of Onset    Hypertension Father     Diabetes Maternal Grandmother     Coronary Artery Disease Maternal Grandfather     Diabetes Maternal Grandfather     Diabetes Paternal Grandfather      Social History   Substance Use Topics    Smoking status: Current Every Day Smoker    Smokeless tobacco: Never Used    Alcohol use Yes     Past Surgical History:   Procedure Laterality Date    ENDOSCOPY, COLON, DIAGNOSTIC      HX APPENDECTOMY      HX CHOLECYSTECTOMY      HX GI      HX GYN           REVIEW OF SYSTEMS  Review of Systems - History obtained from the patient  Psychological ROS: negative  ENT ROS: negative  Hematological and Lymphatic ROS: negative  Endocrine ROS: negative  Respiratory ROS: no cough, shortness of breath, or wheezing  Cardiovascular ROS: no chest pain or dyspnea on exertion  Gastrointestinal ROS: no abdominal pain, change in bowel habits, or black or bloody stools  Genito-Urinary ROS: no dysuria, trouble voiding, or hematuria  Musculoskeletal ROS: negative  Dermatological ROS: negative      PHYSICAL EXAMINATION:    Visit Vitals    /90    Pulse 100    Resp 14    Ht 5' 7\" (1.702 m)    Wt 113.9 kg (251 lb)    SpO2 99%    BMI 39.31 kg/m2     General:  Well defined, nourished, and groomed individual in no acute distress. Neck: Supple, nontender, thyroid within normal limits, no JVD, no bruits, no pain with resistance to active range of motion. Heart: Regular rate and rhythm, no murmurs, rub, or gallop. Normal S1S2. Lungs:  Clear to auscultation bilaterally with equal chest expansion, no cough, no wheeze  Musculoskeletal:  Extremities revealed no edema and had full range of motion of joints. Psych:  Good mood and bright affect    NEUROLOGICAL EXAMINATION:     Mental Status:   Alert and oriented to person, place, and time with recent and remote memory intact. Attention span and concentration are normal. Speech is fluent with a full fund of knowledge. Cranial Nerves:    II, III, IV, VI:  Visual acuity grossly intact.  Visual fields are normal.    Pupils are equal, round, and reactive to light and accommodation. Extra-ocular movements are full and fluid. Fundoscopic exam was benign, no ptosis or nystagmus. V-XII: Hearing is grossly intact. Facial features are symmetric, with normal sensation and strength. The palate rises symmetrically and the tongue protrudes midline. Sternocleidomastoids 5/5. Motor Examination: Normal tone, bulk, and strength. 5/5 muscle strength throughout. No cogwheel rigidity or clonus present. Sensory exam:  Normal throughout to pinprick, temperature, and vibration sense. Normal proprioception. Coordination:  Heel-to-shin was smooth and symmetrical bilaterally. Finger to nose and rapid arm movement testing was normal.   No resting or intention tremor    Gait and Station:  Steady while walking on toes, heels, and with tandem walking. Normal arm swing. No Rhomberg or pronator drift. No muscle wasting or fasiculations noted. Reflexes:  DTRs 2+ throughout. Toes downgoing. LABS / IMAGING  CT Results (most recent):    Results from Hospital Encounter encounter on 04/27/18   CT HEAD WO CONT   Narrative EXAM:  CT HEAD WO CONT    INDICATION: Five-day migraine    COMPARISON: None    TECHNIQUE: Noncontrast head CT. Coronal and sagittal reformats. CT dose  reduction was achieved through use of a standardized protocol tailored for this  examination and automatic exposure control for dose modulation. FINDINGS: The ventricles and sulci are age-appropriate without hydrocephalus. There is no mass effect or midline shift. There is no intracranial hemorrhage or  extra-axial fluid collection. There is no abnormal parenchymal attenuation. The  gray-white matter differentiation is maintained. The basal cisterns are patent. The osseous structures are intact. The visualized paranasal sinuses and mastoid  air cells are clear. Impression IMPRESSION:     There is no acute intracranial abnormality. ASSESSMENT    ICD-10-CM ICD-9-CM    1. Chronic migraine without aura without status migrainosus, not intractable G43.709 346.70 ketorolac (TORADOL) 10 mg tablet      SUMAtriptan succinate (ZEMBRACE SYMTOUCH) 3 mg/0.5 mL pnij      SUMAtriptan succinate (ZEMBRACE SYMTOUCH) 3 mg/0.5 mL pnij      topiramate ER (TROKENDI XR) 50 mg capsule      topiramate ER (TROKENDI XR) 100 mg capsule       DISCUSSION  Ms. Denise Mcdowell has chronic migraines. The treatment strategies for migraines were again reviewed at length including potential triggers. She will continue to look for them. She tried Toradol tablet along with Phenergan and sumatriptan injectable for abortive therapy  We will retry her on topiramate extended release i.e. Trokendi and titrate up to 100 mg daily. We may need to titrate up further if needed. The side effect profile of this medication was reviewed including paresthesias, somnolence, word finding difficulty and pregnancy category D. She currently has implanted birth control device.    If Trokendi does not help, will consider a beta-blocker or calcium channel blocker  Continue periodic follow-up      Tera Rod MD  Diplomate, American Board of Psychiatry & Neurology (Neurology)  [place] Board of Psychiatry & Neurology (Clinical Neurophysiology)  Diplomate, American Board of Electrodiagnostic Medicine

## 2018-04-27 NOTE — DISCHARGE INSTRUCTIONS
Migraine Headache: Care Instructions  Your Care Instructions  Migraines are painful, throbbing headaches that often start on one side of the head. They may cause nausea and vomiting and make you sensitive to light, sound, or smell. Without treatment, migraines can last from 4 hours to a few days. Medicines can help prevent migraines or stop them after they have started. Your doctor can help you find which ones work best for you. Follow-up care is a key part of your treatment and safety. Be sure to make and go to all appointments, and call your doctor if you are having problems. It's also a good idea to know your test results and keep a list of the medicines you take. How can you care for yourself at home? · Do not drive if you have taken a prescription pain medicine. · Rest in a quiet, dark room until your headache is gone. Close your eyes, and try to relax or go to sleep. Don't watch TV or read. · Put a cold, moist cloth or cold pack on the painful area for 10 to 20 minutes at a time. Put a thin cloth between the cold pack and your skin. · Use a warm, moist towel or a heating pad set on low to relax tight shoulder and neck muscles. · Have someone gently massage your neck and shoulders. · Take your medicines exactly as prescribed. Call your doctor if you think you are having a problem with your medicine. You will get more details on the specific medicines your doctor prescribes. · Be careful not to take pain medicine more often than the instructions allow. You could get worse or more frequent headaches when the medicine wears off. To prevent migraines  · Keep a headache diary so you can figure out what triggers your headaches. Avoiding triggers may help you prevent headaches. Record when each headache began, how long it lasted, and what the pain was like.  (Was it throbbing, aching, stabbing, or dull?) Write down any other symptoms you had with the headache, such as nausea, flashing lights or dark spots, or sensitivity to bright light or loud noise. Note if the headache occurred near your period. List anything that might have triggered the headache. Triggers may include certain foods (chocolate, cheese, wine) or odors, smoke, bright light, stress, or lack of sleep. · If your doctor has prescribed medicine for your migraines, take it as directed. You may have medicine that you take only when you get a migraine and medicine that you take all the time to help prevent migraines. ¨ If your doctor has prescribed medicine for when you get a headache, take it at the first sign of a migraine, unless your doctor has given you other instructions. ¨ If your doctor has prescribed medicine to prevent migraines, take it exactly as prescribed. Call your doctor if you think you are having a problem with your medicine. · Find healthy ways to deal with stress. Migraines are most common during or right after stressful times. Take time to relax before and after you do something that has caused a migraine in the past.  · Try to keep your muscles relaxed by keeping good posture. Check your jaw, face, neck, and shoulder muscles for tension. Try to relax them. When you sit at a desk, change positions often. And make sure to stretch for 30 seconds each hour. · Get plenty of sleep and exercise. · Eat meals on a regular schedule. Avoid foods and drinks that often trigger migraines. These include chocolate, alcohol (especially red wine and port), aspartame, monosodium glutamate (MSG), and some additives found in foods (such as hot dogs, blank, cold cuts, aged cheeses, and pickled foods). · Limit caffeine. Don't drink too much coffee, tea, or soda. But don't quit caffeine suddenly. That can also give you migraines. · Do not smoke or allow others to smoke around you. If you need help quitting, talk to your doctor about stop-smoking programs and medicines. These can increase your chances of quitting for good.   · If you are taking birth control pills or hormone therapy, talk to your doctor about whether they are triggering your migraines. When should you call for help? Call 911 anytime you think you may need emergency care. For example, call if:  ? · You have signs of a stroke. These may include:  ¨ Sudden numbness, paralysis, or weakness in your face, arm, or leg, especially on only one side of your body. ¨ Sudden vision changes. ¨ Sudden trouble speaking. ¨ Sudden confusion or trouble understanding simple statements. ¨ Sudden problems with walking or balance. ¨ A sudden, severe headache that is different from past headaches. ?Call your doctor now or seek immediate medical care if:  ? · You have new or worse nausea and vomiting. ? · You have a new or higher fever. ? · Your headache gets much worse. ? Watch closely for changes in your health, and be sure to contact your doctor if:  ? · You are not getting better after 2 days (48 hours). Where can you learn more? Go to http://mari-caamcho.info/. Enter O857 in the search box to learn more about \"Migraine Headache: Care Instructions. \"  Current as of: October 14, 2016  Content Version: 11.4  © 5800-7207 [employer]. Care instructions adapted under license by [employer] (which disclaims liability or warranty for this information). If you have questions about a medical condition or this instruction, always ask your healthcare professional. [place] [address] any warranty or liability for your use of this information. We hope that we have addressed all of your medical concerns. The examination and treatment you received in the Emergency Department were for an emergent problem and were not intended as complete care. It is important that you follow up with your healthcare provider(s) for ongoing care.  If your symptoms worsen or do not improve as expected, and you are unable to reach your usual health care provider(s), you should return to the Emergency Department. Today's healthcare is undergoing tremendous change, and patient satisfaction surveys are one of the many tools to assess the quality of medical care. You may receive a survey from the [employer] organization regarding your experience in the Emergency Department. I hope that your experience has been completely positive, particularly the medical care that I provided. As such, please participate in the survey; anything less than excellent does not meet my expectations or intentions. [facility] and [facility] participate in nationally recognized quality of care measures. If your blood pressure is greater than 120/80, as reported below, we urge that you seek medical care to address the potential of high blood pressure, commonly known as hypertension. Hypertension can be hereditary or can be caused by certain medical conditions, pain, stress, or \"white coat syndrome. \"       Please make an appointment with your health care provider(s) for follow up of your Emergency Department visit. VITALS:   Patient Vitals for the past 8 hrs:   Temp Pulse Resp BP SpO2   04/27/18 1133 - 91 - 124/89 96 %   04/27/18 1100 - 76 - 137/71 91 %   04/27/18 1000 - - - 116/76 98 %   04/27/18 0932 - - - - 98 %   04/27/18 0931 - - - 110/67 -   04/27/18 0833 - - - - 98 %   04/27/18 0819 98.2 °F (36.8 °C) 82 16 (!) 139/94 98 %          Thank you for allowing us to provide you with medical care today. We realize that you have many choices for your emergency care needs. Please choose us in the future for any continued health care needs. Lenora Rogers, [address] [place], [place] [address]y [address].   Office: [phone]            Recent Results (from the past 24 hour(s))   HCG URINE, QL. - POC    Collection Time: 04/27/18 10:40 AM   Result Value Ref Range    Pregnancy test,urine (POC) NEGATIVE  NEG Ct Head Wo Cont    Result Date: 4/27/2018  EXAM:  CT HEAD WO CONT INDICATION: Five-day migraine COMPARISON: None TECHNIQUE: Noncontrast head CT. Coronal and sagittal reformats. CT dose reduction was achieved through use of a standardized protocol tailored for this examination and automatic exposure control for dose modulation. FINDINGS: The ventricles and sulci are age-appropriate without hydrocephalus. There is no mass effect or midline shift. There is no intracranial hemorrhage or extra-axial fluid collection. There is no abnormal parenchymal attenuation. The gray-white matter differentiation is maintained. The basal cisterns are patent. The osseous structures are intact. The visualized paranasal sinuses and mastoid air cells are clear. IMPRESSION: There is no acute intracranial abnormality.

## 2018-07-08 ENCOUNTER — HOSPITAL ENCOUNTER (INPATIENT)
Age: 28
LOS: 10 days | Discharge: ANOTHER HOSPITAL | DRG: 059 | End: 2018-07-24
Attending: EMERGENCY MEDICINE | Admitting: INTERNAL MEDICINE
Payer: COMMERCIAL

## 2018-07-08 ENCOUNTER — APPOINTMENT (OUTPATIENT)
Dept: MRI IMAGING | Age: 28
DRG: 059 | End: 2018-07-08
Attending: INTERNAL MEDICINE
Payer: COMMERCIAL

## 2018-07-08 ENCOUNTER — APPOINTMENT (OUTPATIENT)
Dept: MRI IMAGING | Age: 28
DRG: 059 | End: 2018-07-08
Attending: EMERGENCY MEDICINE
Payer: COMMERCIAL

## 2018-07-08 DIAGNOSIS — G36.0 NEUROMYELITIS OPTICA SPECTRUM DISORDER (HCC): ICD-10-CM

## 2018-07-08 DIAGNOSIS — R20.0 NUMBNESS IN LEFT LEG: ICD-10-CM

## 2018-07-08 DIAGNOSIS — K59.01 CONSTIPATION BY DELAYED COLONIC TRANSIT: ICD-10-CM

## 2018-07-08 DIAGNOSIS — R33.9 URINARY RETENTION: ICD-10-CM

## 2018-07-08 DIAGNOSIS — M54.50 ACUTE BILATERAL LOW BACK PAIN WITHOUT SCIATICA: ICD-10-CM

## 2018-07-08 DIAGNOSIS — G36.0 NEUROMYELITIS OPTICA (HCC): ICD-10-CM

## 2018-07-08 DIAGNOSIS — R29.898 LEFT LEG WEAKNESS: Primary | ICD-10-CM

## 2018-07-08 DIAGNOSIS — M54.5 ACUTE LOW BACK PAIN, UNSPECIFIED BACK PAIN LATERALITY, WITH SCIATICA PRESENCE UNSPECIFIED: ICD-10-CM

## 2018-07-08 PROBLEM — G43.009 NONINTRACTABLE MIGRAINE: Status: ACTIVE | Noted: 2018-07-08

## 2018-07-08 LAB
ALBUMIN SERPL-MCNC: 3.5 G/DL (ref 3.5–5)
ALBUMIN/GLOB SERPL: 1.2 {RATIO} (ref 1.1–2.2)
ALP SERPL-CCNC: 72 U/L (ref 45–117)
ALT SERPL-CCNC: 21 U/L (ref 12–78)
ANION GAP SERPL CALC-SCNC: 10 MMOL/L (ref 5–15)
AST SERPL-CCNC: 14 U/L (ref 15–37)
BASOPHILS # BLD: 0 K/UL (ref 0–0.1)
BASOPHILS NFR BLD: 0 % (ref 0–1)
BILIRUB SERPL-MCNC: 0.5 MG/DL (ref 0.2–1)
BUN SERPL-MCNC: 15 MG/DL (ref 6–20)
BUN/CREAT SERPL: 19 (ref 12–20)
CALCIUM SERPL-MCNC: 8.7 MG/DL (ref 8.5–10.1)
CHLORIDE SERPL-SCNC: 108 MMOL/L (ref 97–108)
CO2 SERPL-SCNC: 23 MMOL/L (ref 21–32)
CREAT SERPL-MCNC: 0.8 MG/DL (ref 0.55–1.02)
DIFFERENTIAL METHOD BLD: ABNORMAL
EOSINOPHIL # BLD: 0 K/UL (ref 0–0.4)
EOSINOPHIL NFR BLD: 0 % (ref 0–7)
ERYTHROCYTE [DISTWIDTH] IN BLOOD BY AUTOMATED COUNT: 12.8 % (ref 11.5–14.5)
GLOBULIN SER CALC-MCNC: 3 G/DL (ref 2–4)
GLUCOSE SERPL-MCNC: 112 MG/DL (ref 65–100)
HCT VFR BLD AUTO: 41.4 % (ref 35–47)
HGB BLD-MCNC: 13.8 G/DL (ref 11.5–16)
IMM GRANULOCYTES # BLD: 0.1 K/UL (ref 0–0.04)
IMM GRANULOCYTES NFR BLD AUTO: 1 % (ref 0–0.5)
LYMPHOCYTES # BLD: 1.1 K/UL (ref 0.8–3.5)
LYMPHOCYTES NFR BLD: 10 % (ref 12–49)
MCH RBC QN AUTO: 31.7 PG (ref 26–34)
MCHC RBC AUTO-ENTMCNC: 33.3 G/DL (ref 30–36.5)
MCV RBC AUTO: 95 FL (ref 80–99)
MONOCYTES # BLD: 0.2 K/UL (ref 0–1)
MONOCYTES NFR BLD: 2 % (ref 5–13)
NEUTS SEG # BLD: 9.6 K/UL (ref 1.8–8)
NEUTS SEG NFR BLD: 88 % (ref 32–75)
NRBC # BLD: 0 K/UL (ref 0–0.01)
NRBC BLD-RTO: 0 PER 100 WBC
PLATELET # BLD AUTO: 221 K/UL (ref 150–400)
PMV BLD AUTO: 10.7 FL (ref 8.9–12.9)
POTASSIUM SERPL-SCNC: 3.9 MMOL/L (ref 3.5–5.1)
PROT SERPL-MCNC: 6.5 G/DL (ref 6.4–8.2)
RBC # BLD AUTO: 4.36 M/UL (ref 3.8–5.2)
SODIUM SERPL-SCNC: 141 MMOL/L (ref 136–145)
WBC # BLD AUTO: 10.9 K/UL (ref 3.6–11)

## 2018-07-08 PROCEDURE — 74011250637 HC RX REV CODE- 250/637: Performed by: EMERGENCY MEDICINE

## 2018-07-08 PROCEDURE — 72141 MRI NECK SPINE W/O DYE: CPT

## 2018-07-08 PROCEDURE — 93005 ELECTROCARDIOGRAM TRACING: CPT

## 2018-07-08 PROCEDURE — 70551 MRI BRAIN STEM W/O DYE: CPT

## 2018-07-08 PROCEDURE — 74011250636 HC RX REV CODE- 250/636: Performed by: EMERGENCY MEDICINE

## 2018-07-08 PROCEDURE — 51702 INSERT TEMP BLADDER CATH: CPT

## 2018-07-08 PROCEDURE — 77030034850

## 2018-07-08 PROCEDURE — 96372 THER/PROPH/DIAG INJ SC/IM: CPT

## 2018-07-08 PROCEDURE — 74011250636 HC RX REV CODE- 250/636: Performed by: INTERNAL MEDICINE

## 2018-07-08 PROCEDURE — 72148 MRI LUMBAR SPINE W/O DYE: CPT

## 2018-07-08 PROCEDURE — 96375 TX/PRO/DX INJ NEW DRUG ADDON: CPT

## 2018-07-08 PROCEDURE — 85025 COMPLETE CBC W/AUTO DIFF WBC: CPT | Performed by: EMERGENCY MEDICINE

## 2018-07-08 PROCEDURE — 96376 TX/PRO/DX INJ SAME DRUG ADON: CPT

## 2018-07-08 PROCEDURE — 99218 HC RM OBSERVATION: CPT

## 2018-07-08 PROCEDURE — 96374 THER/PROPH/DIAG INJ IV PUSH: CPT

## 2018-07-08 PROCEDURE — 74011250637 HC RX REV CODE- 250/637: Performed by: INTERNAL MEDICINE

## 2018-07-08 PROCEDURE — 99285 EMERGENCY DEPT VISIT HI MDM: CPT

## 2018-07-08 PROCEDURE — 80053 COMPREHEN METABOLIC PANEL: CPT | Performed by: EMERGENCY MEDICINE

## 2018-07-08 PROCEDURE — 51798 US URINE CAPACITY MEASURE: CPT

## 2018-07-08 PROCEDURE — 36415 COLL VENOUS BLD VENIPUNCTURE: CPT | Performed by: EMERGENCY MEDICINE

## 2018-07-08 PROCEDURE — 87086 URINE CULTURE/COLONY COUNT: CPT | Performed by: EMERGENCY MEDICINE

## 2018-07-08 PROCEDURE — 72146 MRI CHEST SPINE W/O DYE: CPT

## 2018-07-08 RX ORDER — SODIUM CHLORIDE 0.9 % (FLUSH) 0.9 %
5-10 SYRINGE (ML) INJECTION EVERY 8 HOURS
Status: DISCONTINUED | OUTPATIENT
Start: 2018-07-08 | End: 2018-07-25 | Stop reason: HOSPADM

## 2018-07-08 RX ORDER — DEXAMETHASONE SODIUM PHOSPHATE 10 MG/ML
10 INJECTION INTRAMUSCULAR; INTRAVENOUS
Status: COMPLETED | OUTPATIENT
Start: 2018-07-08 | End: 2018-07-08

## 2018-07-08 RX ORDER — LORAZEPAM 2 MG/ML
2 INJECTION INTRAMUSCULAR
Status: COMPLETED | OUTPATIENT
Start: 2018-07-08 | End: 2018-07-08

## 2018-07-08 RX ORDER — LORAZEPAM 2 MG/ML
2 INJECTION INTRAMUSCULAR ONCE
Status: COMPLETED | OUTPATIENT
Start: 2018-07-08 | End: 2018-07-08

## 2018-07-08 RX ORDER — SODIUM CHLORIDE 0.9 % (FLUSH) 0.9 %
5-10 SYRINGE (ML) INJECTION AS NEEDED
Status: DISCONTINUED | OUTPATIENT
Start: 2018-07-08 | End: 2018-07-25 | Stop reason: HOSPADM

## 2018-07-08 RX ORDER — POLYETHYLENE GLYCOL 3350 17 G/17G
17 POWDER, FOR SOLUTION ORAL 2 TIMES DAILY
Status: DISCONTINUED | OUTPATIENT
Start: 2018-07-08 | End: 2018-07-25 | Stop reason: HOSPADM

## 2018-07-08 RX ORDER — KETOROLAC TROMETHAMINE 30 MG/ML
60 INJECTION, SOLUTION INTRAMUSCULAR; INTRAVENOUS ONCE
Status: COMPLETED | OUTPATIENT
Start: 2018-07-08 | End: 2018-07-08

## 2018-07-08 RX ORDER — OXYCODONE AND ACETAMINOPHEN 7.5; 325 MG/1; MG/1
1 TABLET ORAL
Status: COMPLETED | OUTPATIENT
Start: 2018-07-08 | End: 2018-07-08

## 2018-07-08 RX ORDER — ACETAMINOPHEN 325 MG/1
650 TABLET ORAL
Status: DISCONTINUED | OUTPATIENT
Start: 2018-07-08 | End: 2018-07-25 | Stop reason: HOSPADM

## 2018-07-08 RX ORDER — ENOXAPARIN SODIUM 100 MG/ML
40 INJECTION SUBCUTANEOUS EVERY 24 HOURS
Status: DISCONTINUED | OUTPATIENT
Start: 2018-07-08 | End: 2018-07-24

## 2018-07-08 RX ORDER — PREDNISONE 20 MG/1
TABLET ORAL SEE ADMIN INSTRUCTIONS
COMMUNITY
Start: 2018-07-05 | End: 2018-07-24

## 2018-07-08 RX ADMIN — KETOROLAC TROMETHAMINE 60 MG: 30 INJECTION, SOLUTION INTRAMUSCULAR at 02:17

## 2018-07-08 RX ADMIN — ACETAMINOPHEN 650 MG: 325 TABLET ORAL at 21:51

## 2018-07-08 RX ADMIN — OXYCODONE HYDROCHLORIDE AND ACETAMINOPHEN 1 TABLET: 7.5; 325 TABLET ORAL at 15:57

## 2018-07-08 RX ADMIN — POLYETHYLENE GLYCOL (3350) 17 G: 17 POWDER, FOR SOLUTION ORAL at 18:21

## 2018-07-08 RX ADMIN — LORAZEPAM 2 MG: 2 INJECTION INTRAMUSCULAR; INTRAVENOUS at 12:58

## 2018-07-08 RX ADMIN — DEXAMETHASONE SODIUM PHOSPHATE 10 MG: 10 INJECTION, SOLUTION INTRAMUSCULAR; INTRAVENOUS at 12:39

## 2018-07-08 RX ADMIN — LORAZEPAM 2 MG: 2 INJECTION INTRAMUSCULAR; INTRAVENOUS at 19:43

## 2018-07-08 RX ADMIN — ENOXAPARIN SODIUM 40 MG: 40 INJECTION SUBCUTANEOUS at 18:21

## 2018-07-08 RX ADMIN — LORAZEPAM 2 MG: 2 INJECTION INTRAMUSCULAR; INTRAVENOUS at 12:15

## 2018-07-08 RX ADMIN — Medication 10 ML: at 21:56

## 2018-07-08 NOTE — ED PROVIDER NOTES
HPI Comments: Patient is a 34year old female with a past medical history of anxiety, headaches, seizures, chronic back pain and a past surgical hx of appendectomy, cholecystectomy; presents to the ED c/o low back pain, numbness/weakness in her left leg, tingling in her rt leg today. Pt states that she fell 2 days ago, due to her left leg 'giving out'. Then tonight, pt states she fell again while walking to her car. She reports that her pain is currently 10/10 in severity and is exacerbated with palpation and movement. Pt states that she went to the doctor on Thursday and was prescribed Tizazadine and Prednisone, after having  normal imaging. Pt additionally reports that she has been experiencing  bladder incontinence (4-5 episodes). She denies any LOC, HA, chest pain, shortness of breath, fever, n/v or any other sx. Pt is a current, every day smoker, reports occasional EtOH use. There are no additional medical complaints at this time. She states she had similar sx's 10 years ago and a cause was never determined. It eventually resolved. Signed by: suellen Pearceiblaw for Dario Houston on July 8th, 2018. The history is provided by the patient. No  was used. Past Medical History:   Diagnosis Date    Headache     Headache(784.0)     Seizures ([address]        Past Surgical History:   Procedure Laterality Date    ENDOSCOPY, COLON, DIAGNOSTIC      HX APPENDECTOMY      HX CHOLECYSTECTOMY      HX GI      HX GYN           Family History:   Problem Relation Age of Onset    Hypertension Father     Diabetes Maternal Grandmother     Coronary Artery Disease Maternal Grandfather     Diabetes Maternal Grandfather     Diabetes Paternal Grandfather        Social History     Social History    Marital status: SINGLE     Spouse name: N/A    Number of children: N/A    Years of education: N/A     Occupational History    Not on file.      Social History Main Topics    Smoking status: Current Every Day Smoker    Smokeless tobacco: Never Used    Alcohol use Yes    Drug use: No    Sexual activity: Not Currently     Other Topics Concern    Not on file     Social History Narrative         ALLERGIES: Codeine; Sulfa (sulfonamide antibiotics); Bactrim [sulfamethoprim ds]; Chocolate flavor; Clindamycin; Compazine [prochlorperazine edisylate]; Morphine; Peanut; Reglan [metoclopramide]; Sulfur; Zofran [ondansetron hcl (pf)]; and Metaxalone    Review of Systems   Constitutional: Negative for fever. HENT: Negative for congestion and sore throat. Eyes: Negative for photophobia and visual disturbance. Respiratory: Negative for cough and shortness of breath. Cardiovascular: Negative for chest pain, palpitations and leg swelling. Gastrointestinal: Negative for nausea and vomiting. Genitourinary:        Positive for bladder Incontinence   Musculoskeletal: Positive for back pain and neck pain. Neurological: Positive for weakness and numbness. Negative for syncope. All other systems reviewed and are negative. Vitals:    07/08/18 0148   BP: (!) 151/107   Pulse: 73   Resp: 18   Temp: 98 °F (36.7 °C)   SpO2: 98%   Weight: 99.3 kg (219 lb)   Height: 5' 6\" (1.676 m)            Physical Exam   Constitutional: She appears well-developed and well-nourished.   -tearful   HENT:   Head: Normocephalic and atraumatic. Eyes: Conjunctivae are normal. No scleral icterus. Neck: Neck supple. No tracheal deviation present. Cardiovascular: Normal rate, regular rhythm, normal heart sounds and intact distal pulses. Exam reveals no gallop and no friction rub. No murmur heard. Pulmonary/Chest: Effort normal and breath sounds normal. She has no wheezes. She has no rales. Abdominal: Soft. She exhibits no distension. There is no tenderness. There is no rebound and no guarding. Musculoskeletal: She exhibits no edema. Lumbar back: She exhibits tenderness and pain. Neurological: She is alert.   -able to raise rt leg off of bed, unable to raise left leg  -no sensation even with pinching in Left lower leg or left upper leg  -normal sensation in right upper leg, no sensation in right lower leg (below knee)   Skin: Skin is warm and dry. No rash noted. Psychiatric: She has a normal mood and affect. Nursing note and vitals reviewed. [facility]      ED Course       Procedures    A/P: low back pain with bilateral LE numbness and left leg weakness; + urinary incontinence; Toradol for pain; awaiting MRI. Kelby Paredes MD  6:43 AM    7:00 AM  Change of shift. Care of patient to be signed over to Dr. Tyree Douglas. Bedside handoff to be completed.   Kelby Paredes MD

## 2018-07-08 NOTE — IP AVS SNAPSHOT
[address] 
[phone] Patient: Jessica Poole MRN: [id number] OPI:[id number] A check thiago indicates which time of day the medication should be taken. My Medications CONTINUE taking these medications Instructions Each Dose to Equal  
 Morning Noon Evening Bedtime PROzac 20 mg capsule Generic drug:  FLUoxetine Your last dose was: Your next dose is: Take 20 mg by mouth nightly. 20 mg  
    
   
   
   
  
  
STOP taking these medications NEXPLANON 68 mg Impl Generic drug:  etonogestrel  
   
  
 predniSONE 20 mg tablet Commonly known as:  DELTASONE  
   
  
 tiZANidine 4 mg tablet Commonly known as:  Anastasiya Flattery

## 2018-07-08 NOTE — IP AVS SNAPSHOT
[address] [facility] [address] 
 
 
 [address] [facility] [address] [facility] 
[phone] Patient: Nikole Alvarado MRN: [id number] SRP:[id number]/[id number] About your hospitalization You were admitted on:  July 8, 2018 You last received care in the:  [facility]  MED SURG 2 You were discharged on:  July 24, 2018 Why you were hospitalized Your primary diagnosis was:  Not on File Your diagnoses also included:  Left Leg Weakness, Numbness In Left Leg, Nonintractable Migraine, Seizures (Hcc), Urinary Incontinence, Obesity (Bmi 30-39.9), Neuromyelitis Optica Spectrum Disorder (Hcc), Urinary Retention, Severe Back Pain, Oral Thrush Follow-up Information Follow up With Details Comments Contact Info Milly Knott MD In 4 weeks  [address] [facility] [address] [facility] 
[phone] Leonie Valdivia NP   [address] [facility] 
[phone] Your Scheduled Appointments Thursday August 02, 2018  3:40 PM EDT Follow Up with Milly Knott MD  
[facility] at [facility] [address] [facility]) [address] [facility]  
[phone] Discharge Orders None A check thiago indicates which time of day the medication should be taken. My Medications CONTINUE taking these medications Instructions Each Dose to Equal  
 Morning Noon Evening Bedtime PROzac 20 mg capsule Generic drug:  FLUoxetine Your last dose was: Your next dose is: Take 20 mg by mouth nightly. 20 mg  
    
   
   
   
  
  
STOP taking these medications NEXPLANON 68 mg Impl Generic drug:  etonogestrel  
   
  
 predniSONE 20 mg tablet Commonly known as:  DELTASONE  
   
  
 tiZANidine 4 mg tablet Commonly known as:  Yas Dewey Discharge Instructions None MyChart Announcement We are excited to announce that we are making your provider's discharge notes available to you in [email or web address]. You will see these notes when they are completed and signed by the physician that discharged you from your recent hospital stay. If you have any questions or concerns about any information you see in [email or web address], please call the Health Information Department where you were seen or reach out to your Primary Care Provider for more information about your plan of care. Introducing [facility]! Dear Nate Jonas: Thank you for requesting a [email or web address] account. Our records indicate that you already have an active [email or web address] account. You can access your account anytime at [email or web address] Did you know that you can access your hospital and ER discharge instructions at any time in [email or web address]? You can also review all of your test results from your hospital stay or ER visit. Additional Information If you have questions, please visit the Frequently Asked Questions section of the [email or web address] website at [email or web address] Remember, [email or web address] is NOT to be used for urgent needs. For medical emergencies, dial 911. Now available from your iPhone and Android! Introducing Jasmeet Powers As a [employer] patient, I wanted to make you aware of our electronic visit tool called Jasmeet Thaddeushuongayla. [employer] 24/7 allows you to connect within minutes with a medical provider 24 hours a day, seven days a week via a mobile device or tablet or logging into a secure website from your computer. You can access Jasmeet Powers from anywhere in the [place].  
 
A virtual visit might be right for you when you have a simple condition and feel like you just dont want to get out of bed, or cant get away from work for an appointment, when your regular [employer] provider is not available (evenings, weekends or holidays), or when youre out of town and need minor care. Electronic visits cost only $49 and if the [facility] 24/7 provider determines a prescription is needed to treat your condition, one can be electronically transmitted to a nearby pharmacy*. Please take a moment to enroll today if you have not already done so. The enrollment process is free and takes just a few minutes. To enroll, please download the [facility] 24/Locai rocío to your tablet or phone, or visit [email or web address] to enroll on your computer. And, as an [address] [place] [address] patient with a [employer] account, the results of your visits will be scanned into your electronic medical record and your primary care provider will be able to view the scanned results. We urge you to continue to see your regular [facility] provider for your ongoing medical care. And while your primary care provider may not be the one available when you seek a Jasmeet Travishuongfin virtual visit, the peace of mind you get from getting a real diagnosis real time can be priceless. For more information on [employer], view our Frequently Asked Questions (FAQs) at [email or web address]. Sincerely, 
 
Adrien Guerrero MD 
Chief Medical Officer [employer] *:  certain medications cannot be prescribed via [employer] Unresulted Labs-Please follow up with your PCP about these lab tests Order Current Status LACTIC ACID, CSF In process Providers Seen During Your Hospitalization Provider Specialty Primary office phone Acosta Rodriguez MD Emergency Medicine [phone] Ailin Eddy MD Internal Medicine [phone] Britni Bateman MD Internal Medicine [phone] Kell Oden MD Internal Medicine [phone] Nemesio Stark MD Hospitalist [phone] Efren Cedeño MD Internal Medicine [phone] Your Primary Care Physician (PCP) Primary Care Physician Office Phone Office Fax Anabel Mccarthy [phone] You are allergic to the following Allergen Reactions Codeine Other (comments) Tachycardia Sulfa (Sulfonamide Antibiotics) Itching Bactrim (Sulfamethoprim Ds) Itching Chocolate Flavor Other (comments) Pt reports migraines Clindamycin Other (comments) Chest tightness Compazine (Prochlorperazine Edisylate) Other (comments) Neck and jaw spasm  And difficultly swallow Morphine Hives Peanut Other (comments)  
 migraines Reglan (Metoclopramide) Nausea and Vomiting Vertigo Sleep disorder and shaking Sulfur Swelling Throat closes. Zofran (Ondansetron Hcl (Pf)) Hives Metaxalone Palpitations Recent Documentation Height Weight Breastfeeding? BMI OB Status Smoking Status 1.676 m 113.7 kg No 40.46 kg/m2 Implant Current Every Day Smoker Emergency Contacts Name Discharge Info Relation Home Work Mobile [facility] DISCHARGE CAREGIVER [3] Parent [1] [phone] Thomas Cooper  Father [15] [phone] Efrem Katty [5] [phone] Patient Belongings The following personal items are in your possession at time of discharge: 
  Dental Appliances: None  Visual Aid: Glasses      Home Medications: None   Jewelry: None  Clothing: None    Other Valuables: None Please provide this summary of care documentation to your next provider. Signatures-by signing, you are acknowledging that this After Visit Summary has been reviewed with you and you have received a copy. Patient Signature:  ____________________________________________________________ Date:  ____________________________________________________________  
  
Elliott Mabry Provider Signature:  ____________________________________________________________ Date:  ____________________________________________________________

## 2018-07-08 NOTE — ED NOTES
Verbal shift change report given to Jenny Trammell and Marek Malave (oncoming nurse) by Stacey Lama (offgoing nurse). Report included the following information SBAR, Kardex, ED Summary, Intake/Output, MAR, Recent Results and Cardiac Rhythm sinus magdiel. Bedside report deferred d/t patient sleeping.

## 2018-07-08 NOTE — ED NOTES
TRANSFER - OUT REPORT:    Verbal report given to Herman (name) on Jessica Poole  being transferred to 5th floor - rm 528 (unit) for routine progression of care       Report consisted of patients Situation, Background, Assessment and   Recommendations(SBAR). Information from the following report(s) SBAR, ED Summary, [facility] and Recent Results was reviewed with the receiving nurse. Lines:   Peripheral IV 07/08/18 Right Antecubital (Active)   Site Assessment Clean, dry, & intact 7/8/2018 12:22 PM   Phlebitis Assessment 0 7/8/2018 12:22 PM   Infiltration Assessment 0 7/8/2018 12:22 PM   Dressing Status Clean, dry, & intact; New 7/8/2018 12:22 PM   Dressing Type Transparent 7/8/2018 12:22 PM   Hub Color/Line Status Pink;Flushed;Patent 7/8/2018 12:22 PM        Opportunity for questions and clarification was provided.       Patient transported with:   [employer]

## 2018-07-08 NOTE — ED NOTES
Patient here all day and had to involve ortho spine, neurology and eventually admit to the hospitalist for further pain control. More MRI's and PT eval and chance for consultants to add to the situation. Patient with urinary retention - suspect overflow incontinence as cause of her wetting herself in the bed         VITALS:   Patient Vitals for the past 8 hrs:   BP SpO2   07/08/18 1730 122/82 97 %   07/08/18 1715 (!) 132/91 99 %   07/08/18 1700 128/87 97 %   07/08/18 1645 124/83 96 %   07/08/18 1630 110/73 96 %   07/08/18 1615 114/68 97 %   07/08/18 1600 128/88 100 %   07/08/18 1545 113/80 97 %   07/08/18 1530 117/73 97 %   07/08/18 1515 111/69 100 %   07/08/18 1500 109/57 98 %                  Recent Results (from the past 24 hour(s))   CBC WITH AUTOMATED DIFF    Collection Time: 07/08/18  4:36 PM   Result Value Ref Range    WBC 10.9 3.6 - 11.0 K/uL    RBC 4.36 3.80 - 5.20 M/uL    HGB 13.8 11.5 - 16.0 g/dL    HCT 41.4 35.0 - 47.0 %    MCV 95.0 80.0 - 99.0 FL    MCH 31.7 26.0 - 34.0 PG    MCHC 33.3 30.0 - 36.5 g/dL    RDW 12.8 11.5 - 14.5 %    PLATELET 120 718 - 348 K/uL    MPV 10.7 8.9 - 12.9 FL    NRBC 0.0 0  WBC    ABSOLUTE NRBC 0.00 0.00 - 0.01 K/uL    NEUTROPHILS 88 (H) 32 - 75 %    LYMPHOCYTES 10 (L) 12 - 49 %    MONOCYTES 2 (L) 5 - 13 %    EOSINOPHILS 0 0 - 7 %    BASOPHILS 0 0 - 1 %    IMMATURE GRANULOCYTES 1 (H) 0.0 - 0.5 %    ABS. NEUTROPHILS 9.6 (H) 1.8 - 8.0 K/UL    ABS. LYMPHOCYTES 1.1 0.8 - 3.5 K/UL    ABS. MONOCYTES 0.2 0.0 - 1.0 K/UL    ABS. EOSINOPHILS 0.0 0.0 - 0.4 K/UL    ABS. BASOPHILS 0.0 0.0 - 0.1 K/UL    ABS. IMM.  GRANS. 0.1 (H) 0.00 - 0.04 K/UL    DF AUTOMATED     METABOLIC PANEL, COMPREHENSIVE    Collection Time: 07/08/18  4:36 PM   Result Value Ref Range    Sodium 141 136 - 145 mmol/L    Potassium 3.9 3.5 - 5.1 mmol/L    Chloride 108 97 - 108 mmol/L    CO2 23 21 - 32 mmol/L    Anion gap 10 5 - 15 mmol/L    Glucose 112 (H) 65 - 100 mg/dL    BUN 15 6 - 20 MG/DL    Creatinine 0.80 0.55 - 1.02 MG/DL    BUN/Creatinine ratio 19 12 - 20      GFR est AA >60 >60 ml/min/1.73m2    GFR est non-AA >60 >60 ml/min/1.73m2    Calcium 8.7 8.5 - 10.1 MG/DL    Bilirubin, total 0.5 0.2 - 1.0 MG/DL    ALT (SGPT) 21 12 - 78 U/L    AST (SGOT) 14 (L) 15 - 37 U/L    Alk. phosphatase 72 45 - 117 U/L    Protein, total 6.5 6.4 - 8.2 g/dL    Albumin 3.5 3.5 - 5.0 g/dL    Globulin 3.0 2.0 - 4.0 g/dL    A-G Ratio 1.2 1.1 - 2.2         Mri Lumb Spine Wo Cont    Result Date: 7/8/2018  EXAM:  MRI LUMB SPINE WO CONT INDICATION:  low back pain, bilateral leg weakness (L>R) with falls, urinary incontinence. COMPARISON: None TECHNIQUE: MR imaging of the lumbar spine was performed using the following sequences: sagittal T1, T2, STIR;  axial T1, T2. CONTRAST:  None. FINDINGS: There is normal alignment of the lumbar spine. Vertebral body and disc space heights are maintained. Marrow signal is normal. The conus medullaris terminates at L1. Signal and caliber of the distal spinal cord are within normal limits. The paraspinal soft tissues are within normal limits. Lower thoracic spine: No herniation or stenosis. L1-L2 through L5-S1:  No herniation or stenosis. IMPRESSION: Normal MRI of the lumbar spine.

## 2018-07-08 NOTE — Clinical Note
Patient Class[de-identified] Observation [396] Type of Bed: Medical [8] Reason for Observation: left leg weakness Admitting Diagnosis: Left leg weakness [[id number]] Admitting Physician: Bhavik Mercedes Attending Physician: Bhavik Mercedes

## 2018-07-08 NOTE — ED NOTES
Patient is resting quietly in bed. Patient remains sinus magdiel on cardiac monitor at 43bpm. Patient reports pain is a 9 on 0-10 scale located in her lower back.

## 2018-07-08 NOTE — ED NOTES
7:01 AM  Change of shift. Care of patient taken over from Dr. Alexis Frey; H&P reviewed, bedside handoff complete.   Awaiting lumbar spine MRI

## 2018-07-08 NOTE — ED NOTES
Patient HR 40's, asymptomatic   Had episode of incontinence, incontinence care performed, linen changed    Provider notified, no orders recieved

## 2018-07-08 NOTE — ED TRIAGE NOTES
Patient states she was walking 2 day ago and \" my leg gave out\". States went to [facility] on Thurs, given Tizazadine and Prednisone, did xrays which WNL. States unable to feel when she need to \" go to the bathroom, my left leg is numb and my right leg is tingling\". Relays that she fell again today.

## 2018-07-08 NOTE — PROGRESS NOTES
[facility]: MED RECONCILIATION      Prior to Admission Medications   Prescriptions Last Dose Informant Patient Reported? Taking? FLUoxetine (PROZAC) 20 mg capsule 7/7/2018 at Unknown time  Yes Yes   Sig: Take 20 mg by mouth nightly. etonogestrel (NEXPLANON) 68 mg impl 3/2018  Yes Yes   Sig: by SubDERmal route once. predniSONE (DELTASONE) 20 mg tablet 7/7/2018 at Unknown time  Yes Yes   Sig: Take  by mouth See Admin Instructions. Take 3 tabs po for 3 days, then 2 tabs po for 3 days, then 1 tab for 3 days. Take until gone. tiZANidine (ZANAFLEX) 4 mg tablet 7/7/2018 at Unknown time  Yes Yes   Sig: Take 4 mg by mouth every six (6) hours as needed.  1-2 tabs QHS prn for back  pain       Facility-Administered Medications: None      Thank you,      Duane Llano, PharmD, BCPS

## 2018-07-08 NOTE — PROGRESS NOTES
ORTHO: 
 
I examined pt's C spine. No Spinous process pain. No paraspinous process pain on exam. Strength 5/5 BUE's, DTR 2+ BUE's,Negative Hoffmans sign, motor/sensory intact BUE's. NVI distally BUE's. Cap. Refill <2secs all finger. Negative ankle clonus BLE's, numbness circumfrential Bilateral glutes and thighs without dermatomal orientation. Questionable participation with dorsiflexion LLE although present. MRI L spine without findings. No physical findings on Upper Extremity spine exam to warrant MRI at this time. Dr. Bandar Galicia (ER MD) Spoke with Neuro who suggests MRI C, T and brain. I discussed findings with pt. And family, all questions were answered. We will follow up in AM after MRI C, T spine to eval if any Ortho Spine findings. Dr. Jesus Aldridge agrees with plan. Thank you for allowing us to take part in this patients care. AVERY Flores-C Orthopaedic Surgery PA

## 2018-07-08 NOTE — H&P
Admission History and Physical 
 
 
NAME:  Cristin Garcia :   1990 MRN:  [id number] PCP:  Krissy Meraz NP Date/Time:  2018 Assessment/Plan:   
  
Left leg weakness (2018) / Numbness in left leg (2018) / Urinary incontinence / Urinary retention:  Seen by ortho in ED. Lumbar spine MRI WNL. Had similar 6-7 years ago and was admitted to [facility] for one week w/o diagnosis. Unclear etiology for symptoms. However, patellar reflexes are hyperreflexic but equal.  ED physician spoke with neurologist who recommended additional MRI studies of brain and upper spine. -- neurology evaluation -- PT/OT evaluation 
 -- continue quinonez until additional work up done -- mri brain, c spine, t spine Bradycardia:   
 -- check EKG 
 -- check TSH Seizures ([address] ():  Not on medications for this despite reported hx. Nonintractable migraine (2018): Followed by neurology as outpatient. Subjective: CHIEF COMPLAINT:  Numbness and weakness HISTORY OF PRESENT ILLNESS:    
Ms. Ami Ochoa is a 34 y.o.  female who is admitted with Left leg weakness. Ms. Ami Ochoa presented to the Emergency Department today complaining of numbness and weakness of left leg. Symptoms started last week. Was walking to car and left leg gave out. Since then has had weakness and numbness. Portillo Perking again today due to symptoms. Was seen at Ortho-on-call when symptoms first started. rec'd steroid and muscle relaxant w/o improvement. Reports similar but less severe symptoms 6-7 years ago. Was admitted to [facility] x 1 week w/o diagnosis despite work up. Followed up with PCP and given course of steroids and improved after another week or two. Past Medical History:  
Diagnosis Date  Headache   
 Headache(784.0)  Seizures ([address] Past Surgical History:  
Procedure Laterality Date  ENDOSCOPY, COLON, DIAGNOSTIC    
 HX APPENDECTOMY  HX CHOLECYSTECTOMY  HX GI    
 HX GYN Social History Substance Use Topics  Smoking status: Current Every Day Smoker  Smokeless tobacco: Never Used  Alcohol use Yes Family History Problem Relation Age of Onset  Hypertension Father  Diabetes Maternal Grandmother  Coronary Artery Disease Maternal Grandfather  Diabetes Maternal Grandfather  Diabetes Paternal Grandfather Allergies Allergen Reactions  Codeine Other (comments) Tachycardia  Sulfa (Sulfonamide Antibiotics) Itching  Bactrim [Sulfamethoprim Ds] Itching  Chocolate Flavor Other (comments) Pt reports migraines  Clindamycin Other (comments) Chest tightness  Compazine [Prochlorperazine Edisylate] Other (comments) Neck and jaw spasm  And difficultly swallow  Morphine Hives  Peanut Other (comments)  
  migraines  Reglan [Metoclopramide] Nausea and Vomiting and Vertigo Sleep disorder and shaking  Sulfur Swelling Throat closes.  Zofran [Ondansetron Hcl (Pf)] Hives  Metaxalone Palpitations Prior to Admission medications Medication Sig Start Date End Date Taking? Authorizing Provider  
predniSONE (DELTASONE) 20 mg tablet Take  by mouth See Admin Instructions. Take 3 tabs po for 3 days, then 2 tabs po for 3 days, then 1 tab for 3 days. Take until gone. 7/5/18 7/14/18 Yes Historical Provider FLUoxetine (PROZAC) 20 mg capsule Take 20 mg by mouth nightly. Yes Phys Other, MD  
etonogestrel (NEXPLANON) 68 mg impl by SubDERmal route once. Yes Historical Provider  
tiZANidine (ZANAFLEX) 4 mg tablet Take 4 mg by mouth every six (6) hours as needed. 1-2 tabs QHS prn for back  pain    Yes Historical Provider Review of Systems: 
(bold if positive, if negative) Gen:  Eyes:  ENT:  CVS:  Pulm:  GI:   
:  incontinence MS:  Skin:  Endo:   
Hem:  Renal:   
Neuro:  Numbness, tingling, weakness Objective: VITALS:   
Vital signs reviewed; most recent are: 
 
Visit Vitals  /80  Pulse (!) 54  Temp 98 °F (36.7 °C)  Resp 15  Ht 5' 6\" (1.676 m)  Wt 99.3 kg (219 lb)  SpO2 97%  BMI 35.35 kg/m2 SpO2 Readings from Last 6 Encounters:  
07/08/18 97% 04/27/18 99% 04/27/18 96% 10/30/17 97% 10/21/16 98% 10/21/16 96% Intake/Output Summary (Last 24 hours) at 07/08/18 1734 Last data filed at 07/08/18 1451 Gross per 24 hour Intake                0 ml Output               50 ml Net              -50 ml Exam:  
 
Physical Exam: 
 
Gen:  Well-developed, well-nourished, in no acute distress HEENT:  Pink conjunctivae, PERRL, hearing intact to voice, moist mucous membranes Resp:  No accessory muscle use, clear breath sounds without wheezes rales or rhonchi 
Card:  No murmurs, normal S1, S2 without thrills or peripheral edema Abd:  Soft, non-tender, non-distended, normoactive bowel sounds are present Musc:  No cyanosis, mild Lspine ttp Skin:  No rashes or ulcers, skin turgor is good Neuro:  Cranial nerves 3-12 are grossly intact,  strength is 5/5 bilaterally, left hip flexion strength decreased, knee extension and flexion equal but inconsistent with exam, DTR 3+ b/l of patella, follows commands appropriately Psych:  Alert with good insight. Oriented to person, place, and time Labs: 
 
Recent Labs  
   07/08/18 
 1636 WBC  10.9 HGB  13.8 HCT  41.4 PLT  221 Recent Labs  
   07/08/18 
 1636 NA  141  
K  3.9 CL  108 CO2  23 GLU  112* BUN  15  
CREA  0.80 CA  8.7 ALB  3.5 TBILI  0.5 SGOT  14* ALT  21 Lab Results Component Value Date/Time Glucose (POC) 96 06/20/2012 01:55 AM  
 Glucose (POC) 93 05/17/2012 04:56 PM  
 
No results for input(s): PH, PCO2, PO2, HCO3, FIO2 in the last 72 hours. No results for input(s): INR in the last 72 hours. No lab exists for component: INREXT Lspine MRI:  WNL Medical records reviewed in preparation for this admission:  Old medical records. Surrogate decision maker:  mother and father Total time spent in care of this patient: 50 Minutes Care Plan discussed with: Patient and Family Discussed:  Care Plan Prophylaxis:  Lovenox Probable Disposition:  Home w/Family 
        
___________________________________________________ Attending Physician: Nighat Blanc MD

## 2018-07-08 NOTE — CONSULTS
ORTHO CONSULT    Subjective:     Date of Consultation:  July 8, 2018    Referring Physician:  Dr. Vincent Whipple is a 34 y.o. female we are consulted to see for L LBP, weakness and numbness. Pt. States she has had 6 falls in the past few days due to her LLE \"giving out on me\". Pt. Denies trauma, states she had a similar episode 6 years ago, admitted to [facility], MRI \"neg\" of the spine. Since then, recurrent episodes from time to time of LBP, improved with steroids. Started steroid pack Friday from Primary care office. C/o urinary incontinence x2 time today in the bed. Denies loss of bowel control. There are no active problems to display for this patient. Family History   Problem Relation Age of Onset    Hypertension Father     Diabetes Maternal Grandmother     Coronary Artery Disease Maternal Grandfather     Diabetes Maternal Grandfather     Diabetes Paternal Grandfather       Social History   Substance Use Topics    Smoking status: Current Every Day Smoker    Smokeless tobacco: Never Used    Alcohol use Yes     Past Medical History:   Diagnosis Date    Headache     Headache(784.0)     Seizures (HCC)       Past Surgical History:   Procedure Laterality Date    ENDOSCOPY, COLON, DIAGNOSTIC      HX APPENDECTOMY      HX CHOLECYSTECTOMY      HX GI      HX GYN        Prior to Admission medications    Medication Sig Start Date End Date Taking? Authorizing Provider   FLUoxetine (PROZAC) 20 mg capsule Take  by mouth daily. Aicha Goss MD   promethazine (PHENERGAN) 25 mg tablet Take 1 Tab by mouth every six (6) hours as needed. 4/27/18   Tigist So PA-C   ketorolac (TORADOL) 10 mg tablet Take 1 Tab by mouth every six (6) hours as needed for Pain.  4/27/18   Jessi Lutz MD   SUMAtriptan succinate (ZEMBRACE SYMTOUCH) 3 mg/0.5 mL pnij 1 SC PRN, may repeat X1 in 2 hours 4/27/18   Jessi Lutz MD   SUMAtriptan succinate (ZEMBRACE SYMTOUCH) 3 mg/0.5 mL pnij 3 mg by SubCUTAneous route as needed. 4/27/18   Jessi Lutz MD   topiramate ER (TROKENDI XR) 50 mg capsule Take 1 Cap by mouth daily. 4/27/18   Jessi Luzt MD   topiramate ER (TROKENDI XR) 100 mg capsule Take 1 Cap by mouth daily. 4/27/18   Jessi Lutz MD   etonogestrel (NEXPLANON) 68 mg impl by SubDERmal route. Historical Provider   meloxicam (MOBIC) 15 mg tablet Take 1 Tab by mouth daily. 10/30/17   Saritha Lawrence MD   traMADol (ULTRAM) 50 mg tablet Take 1 Tab by mouth every six (6) hours as needed for Pain. Max Daily Amount: 200 mg. 10/21/16   AVERY Kramer   promethazine (PHENERGAN) 25 mg tablet Take 1 Tab by mouth every six (6) hours as needed. 9/26/16   Chad Solo NP   oxyCODONE-acetaminophen (PERCOCET) 5-325 mg per tablet Take 1 Tab by mouth every six (6) hours as needed for Pain. Max Daily Amount: 4 Tabs. 9/26/16   Chad Solo NP   Diclofenac Potassium (CAMBIA) 50 mg pwpk Take 50 mg by mouth as needed for Pain (migraine) for up to 4 doses. 4/22/15   Ana Cartwright NP   tiZANidine (ZANAFLEX) 4 mg tablet Take 4 mg by mouth every six (6) hours as needed. 1-2 tabs QHS prn for back  pain     Historical Provider   naproxen sodium (ALEVE) 220 mg tablet Take 220 mg by mouth. 500 MG PRN for back pain     Historical Provider   Nancy TREVIÑO, 0.3-30 mg-mcg per tablet  3/28/11   Historical Provider     No current facility-administered medications for this encounter. Current Outpatient Prescriptions   Medication Sig    FLUoxetine (PROZAC) 20 mg capsule Take  by mouth daily.  promethazine (PHENERGAN) 25 mg tablet Take 1 Tab by mouth every six (6) hours as needed.  ketorolac (TORADOL) 10 mg tablet Take 1 Tab by mouth every six (6) hours as needed for Pain.  SUMAtriptan succinate (ZEMBRACE SYMTOUCH) 3 mg/0.5 mL pnij 1 SC PRN, may repeat X1 in 2 hours    SUMAtriptan succinate (ZEMBRACE SYMTOUCH) 3 mg/0.5 mL pnij 3 mg by SubCUTAneous route as needed.     topiramate ER (TROKENDI XR) 50 mg capsule Take 1 Cap by mouth daily.  topiramate ER (TROKENDI XR) 100 mg capsule Take 1 Cap by mouth daily.  etonogestrel (NEXPLANON) 68 mg impl by SubDERmal route.  meloxicam (MOBIC) 15 mg tablet Take 1 Tab by mouth daily.  traMADol (ULTRAM) 50 mg tablet Take 1 Tab by mouth every six (6) hours as needed for Pain. Max Daily Amount: 200 mg.  promethazine (PHENERGAN) 25 mg tablet Take 1 Tab by mouth every six (6) hours as needed.  oxyCODONE-acetaminophen (PERCOCET) 5-325 mg per tablet Take 1 Tab by mouth every six (6) hours as needed for Pain. Max Daily Amount: 4 Tabs.  Diclofenac Potassium (CAMBIA) 50 mg pwpk Take 50 mg by mouth as needed for Pain (migraine) for up to 4 doses.  tiZANidine (ZANAFLEX) 4 mg tablet Take 4 mg by mouth every six (6) hours as needed. 1-2 tabs QHS prn for back  pain     naproxen sodium (ALEVE) 220 mg tablet Take 220 mg by mouth. 500 MG PRN for back pain     CRYSELLE, 28, 0.3-30 mg-mcg per tablet      Allergies   Allergen Reactions    Codeine Other (comments)     Tachycardia    Sulfa (Sulfonamide Antibiotics) Itching    Bactrim [Sulfamethoprim Ds] Itching    Chocolate Flavor Other (comments)     Pt reports migraines    Clindamycin Other (comments)     Chest tightness    Compazine [Prochlorperazine Edisylate] Other (comments)     Neck and jaw spasm  And difficultly swallow     Morphine Hives    Peanut Other (comments)     migraines    Reglan [Metoclopramide] Nausea and Vomiting and Vertigo     Sleep disorder and shaking    Sulfur Swelling     Throat closes.  Zofran [Ondansetron Hcl (Pf)] Hives    Metaxalone Palpitations        Review of Systems:  A comprehensive review of systems was negative except for that written in the HPI. Objective:     Visit Vitals    BP (!) 126/94    Pulse (!) 54    Temp 98 °F (36.7 °C)    Resp 15    Ht 5' 6\" (1.676 m)    Wt 99.3 kg (219 lb)    SpO2 97%    BMI 35.35 kg/m2       EXAM: I examined pt's L spine. No Spinous process pain. L4-S1 L  paraspinous process pain on exam. Strength 5/5 RLE, 4/5 with straight leg raise on LLE, DTR 2+ BLE's, +Dorsi/plantar flexion BLE's although slightly decreased LLE. NVI distally BLE's. Tingling noted on L4-S4 dermatomes, decreased sensation perianal and perineal regions, moderate rectal tone per ER MD on exam. Cap. Refill <2secs all toes. 600ml Urine retention in bladder on US post incontinence. XR Results (most recent): MRI L spine pending      Assessment/Plan:     Left Lower Back pain with radiculopathy and myopathy      Decadron 10mg IV given. Awaiting MRI results. Keep NPO    Thank you for allowing us to take part in this patients care. Dr. Carla Hernandez agrees with plan.       Billie Barker PA-C  Orthopaedic Surgery AVERY Barker PA-C    Orthopaedic Surgery PA

## 2018-07-08 NOTE — ED NOTES
Patient requesting something for pain. No change in pain since last documented assessment. Patient repositioned to position of comfort and also reassured of plan to communicate request to provider.   Dr. Candace Banda notified of patient's request.

## 2018-07-09 ENCOUNTER — APPOINTMENT (OUTPATIENT)
Dept: GENERAL RADIOLOGY | Age: 28
DRG: 059 | End: 2018-07-09
Attending: PSYCHIATRY & NEUROLOGY
Payer: COMMERCIAL

## 2018-07-09 PROBLEM — G43.009 NONINTRACTABLE MIGRAINE: Chronic | Status: ACTIVE | Noted: 2018-07-08

## 2018-07-09 PROBLEM — R32 URINARY INCONTINENCE: Status: ACTIVE | Noted: 2018-07-09

## 2018-07-09 PROBLEM — R00.1 BRADYCARDIA, SINUS: Status: ACTIVE | Noted: 2018-07-09

## 2018-07-09 PROBLEM — E66.9 OBESITY (BMI 30-39.9): Chronic | Status: ACTIVE | Noted: 2018-07-09

## 2018-07-09 LAB
APPEARANCE CSF: CLEAR
ATRIAL RATE: 82 BPM
BACTERIA SPEC CULT: NORMAL
CALCULATED P AXIS, ECG09: 50 DEGREES
CALCULATED R AXIS, ECG10: 22 DEGREES
CALCULATED T AXIS, ECG11: 44 DEGREES
CC UR VC: NORMAL
COLOR CSF: COLORLESS
DIAGNOSIS, 93000: NORMAL
GLUCOSE CSF-MCNC: 60 MG/DL (ref 40–70)
P-R INTERVAL, ECG05: 128 MS
PROT CSF-MCNC: 38 MG/DL (ref 15–45)
Q-T INTERVAL, ECG07: 384 MS
QRS DURATION, ECG06: 84 MS
QTC CALCULATION (BEZET), ECG08: 448 MS
RBC # CSF: 535 /CU MM
SERVICE CMNT-IMP: NORMAL
T4 FREE SERPL-MCNC: 0.9 NG/DL (ref 0.8–1.5)
TSH SERPL DL<=0.05 MIU/L-ACNC: 2.47 UIU/ML (ref 0.36–3.74)
TUBE # CSF: 1
TUBE # CSF: 1
TUBE # CSF: 3
VENTRICULAR RATE, ECG03: 82 BPM
WBC # CSF: 4 /CU MM (ref 0–5)

## 2018-07-09 PROCEDURE — 77003 FLUOROGUIDE FOR SPINE INJECT: CPT

## 2018-07-09 PROCEDURE — 74011250636 HC RX REV CODE- 250/636: Performed by: INTERNAL MEDICINE

## 2018-07-09 PROCEDURE — 82945 GLUCOSE OTHER FLUID: CPT | Performed by: NURSE PRACTITIONER

## 2018-07-09 PROCEDURE — 89050 BODY FLUID CELL COUNT: CPT | Performed by: NURSE PRACTITIONER

## 2018-07-09 PROCEDURE — 74011250637 HC RX REV CODE- 250/637: Performed by: INTERNAL MEDICINE

## 2018-07-09 PROCEDURE — 82042 OTHER SOURCE ALBUMIN QUAN EA: CPT | Performed by: PSYCHIATRY & NEUROLOGY

## 2018-07-09 PROCEDURE — 77030038269 HC DRN EXT URIN PURWCK BARD -A

## 2018-07-09 PROCEDURE — 86695 HERPES SIMPLEX TYPE 1 TEST: CPT | Performed by: PSYCHIATRY & NEUROLOGY

## 2018-07-09 PROCEDURE — 74011000250 HC RX REV CODE- 250

## 2018-07-09 PROCEDURE — 84157 ASSAY OF PROTEIN OTHER: CPT | Performed by: NURSE PRACTITIONER

## 2018-07-09 PROCEDURE — 84443 ASSAY THYROID STIM HORMONE: CPT | Performed by: INTERNAL MEDICINE

## 2018-07-09 PROCEDURE — 36415 COLL VENOUS BLD VENIPUNCTURE: CPT | Performed by: INTERNAL MEDICINE

## 2018-07-09 PROCEDURE — 83605 ASSAY OF LACTIC ACID: CPT | Performed by: NURSE PRACTITIONER

## 2018-07-09 PROCEDURE — 87205 SMEAR GRAM STAIN: CPT | Performed by: NURSE PRACTITIONER

## 2018-07-09 PROCEDURE — 77030019701 HC TY LUMBR PUNC SIMS -A

## 2018-07-09 PROCEDURE — 84439 ASSAY OF FREE THYROXINE: CPT | Performed by: INTERNAL MEDICINE

## 2018-07-09 PROCEDURE — 99218 HC RM OBSERVATION: CPT

## 2018-07-09 PROCEDURE — 74011250637 HC RX REV CODE- 250/637: Performed by: PSYCHIATRY & NEUROLOGY

## 2018-07-09 RX ORDER — TRAMADOL HYDROCHLORIDE 50 MG/1
100 TABLET ORAL EVERY 6 HOURS
Status: DISCONTINUED | OUTPATIENT
Start: 2018-07-09 | End: 2018-07-10

## 2018-07-09 RX ORDER — FLUOXETINE HYDROCHLORIDE 20 MG/1
20 CAPSULE ORAL
Status: DISCONTINUED | OUTPATIENT
Start: 2018-07-09 | End: 2018-07-25 | Stop reason: HOSPADM

## 2018-07-09 RX ORDER — LIDOCAINE HYDROCHLORIDE 10 MG/ML
10 INJECTION INFILTRATION; PERINEURAL ONCE
Status: COMPLETED | OUTPATIENT
Start: 2018-07-09 | End: 2018-07-09

## 2018-07-09 RX ORDER — GABAPENTIN 300 MG/1
300 CAPSULE ORAL EVERY 8 HOURS
Status: DISCONTINUED | OUTPATIENT
Start: 2018-07-09 | End: 2018-07-10

## 2018-07-09 RX ORDER — LIDOCAINE HYDROCHLORIDE 10 MG/ML
INJECTION INFILTRATION; PERINEURAL
Status: COMPLETED
Start: 2018-07-09 | End: 2018-07-09

## 2018-07-09 RX ORDER — TIZANIDINE 2 MG/1
4 TABLET ORAL 4 TIMES DAILY
Status: DISCONTINUED | OUTPATIENT
Start: 2018-07-09 | End: 2018-07-10

## 2018-07-09 RX ORDER — AMOXICILLIN 250 MG
1 CAPSULE ORAL 2 TIMES DAILY
Status: DISCONTINUED | OUTPATIENT
Start: 2018-07-09 | End: 2018-07-10

## 2018-07-09 RX ORDER — POLYETHYLENE GLYCOL 3350 17 G/17G
17 POWDER, FOR SOLUTION ORAL 2 TIMES DAILY
Status: DISCONTINUED | OUTPATIENT
Start: 2018-07-09 | End: 2018-07-10

## 2018-07-09 RX ORDER — LIDOCAINE HYDROCHLORIDE 10 MG/ML
5 INJECTION, SOLUTION EPIDURAL; INFILTRATION; INTRACAUDAL; PERINEURAL
Status: DISCONTINUED | OUTPATIENT
Start: 2018-07-09 | End: 2018-07-09

## 2018-07-09 RX ADMIN — Medication 10 ML: at 21:53

## 2018-07-09 RX ADMIN — GABAPENTIN 300 MG: 300 CAPSULE ORAL at 13:16

## 2018-07-09 RX ADMIN — LIDOCAINE HYDROCHLORIDE 8 ML: 10 INJECTION, SOLUTION INFILTRATION; PERINEURAL at 16:27

## 2018-07-09 RX ADMIN — TRAMADOL HYDROCHLORIDE 100 MG: 50 TABLET, FILM COATED ORAL at 21:53

## 2018-07-09 RX ADMIN — LIDOCAINE HYDROCHLORIDE 8 ML: 10 INJECTION INFILTRATION; PERINEURAL at 16:27

## 2018-07-09 RX ADMIN — ACETAMINOPHEN 650 MG: 325 TABLET ORAL at 03:40

## 2018-07-09 RX ADMIN — GABAPENTIN 300 MG: 300 CAPSULE ORAL at 21:53

## 2018-07-09 RX ADMIN — Medication 10 ML: at 06:00

## 2018-07-09 RX ADMIN — FLUOXETINE 20 MG: 20 CAPSULE ORAL at 21:53

## 2018-07-09 RX ADMIN — TIZANIDINE 4 MG: 2 TABLET ORAL at 18:33

## 2018-07-09 RX ADMIN — Medication 10 ML: at 14:00

## 2018-07-09 RX ADMIN — POLYETHYLENE GLYCOL (3350) 17 G: 17 POWDER, FOR SOLUTION ORAL at 08:30

## 2018-07-09 RX ADMIN — STANDARDIZED SENNA CONCENTRATE AND DOCUSATE SODIUM 1 TABLET: 8.6; 5 TABLET, FILM COATED ORAL at 18:33

## 2018-07-09 RX ADMIN — TIZANIDINE 4 MG: 2 TABLET ORAL at 21:53

## 2018-07-09 RX ADMIN — TRAMADOL HYDROCHLORIDE 100 MG: 50 TABLET, FILM COATED ORAL at 13:16

## 2018-07-09 RX ADMIN — POLYETHYLENE GLYCOL (3350) 17 G: 17 POWDER, FOR SOLUTION ORAL at 18:32

## 2018-07-09 RX ADMIN — ENOXAPARIN SODIUM 40 MG: 40 INJECTION SUBCUTANEOUS at 18:32

## 2018-07-09 RX ADMIN — POLYETHYLENE GLYCOL (3350) 17 G: 17 POWDER, FOR SOLUTION ORAL at 19:00

## 2018-07-09 NOTE — CONSULTS
[facility]   Inpatient Neurology Consult    Job MD Celio    [address], [address] [address], [address]  [address]   Blayne [address]aza [phone]    Medical record #: [id number]  Admission Date: 7/8/2018  Consult Date:  07/09/18  Referring Provider: Betsy Matthews MD/ ER    Chief Complaint:  Leg weakness  Source of Hx:  Chart, Patient, Parents    HISTORY OF PRESENT ILLNESS:     This is a 34 y.o. female with hx of Headache, Seizure disorder followed by my colleague Dr [facility]) who was admitted for complaint of right leg weakness and I'm asked to see for that issue. Discussed with Dr Xiomara Welch yesterday afternoon and reviewed EMR notes. They describe that they were on a beach vacation, everything going fine, nothing stressful, and pt had left yesterday morning to come home. She got to a rest stop about 30 minutes away from the beach and when she was walking her left leg gave out. She decided to drive back to parents at the beach then the all drove home together. She went to [facility], had some back x-rays done which she says were normal, so was given Rx for Prednisone pack and Tizanidine. Since then, she complains of severe lower back pain (10/ 10), left leg feeling numb, right leg tingling, and urinary incontinence, requiring catheter. She denied having any recent illnesses, denied any recent injury or new/ increased stressors before this happened. She/ family say that she had almost the exact same symptoms 4-5 years ago (2012 by EMR records) and she was evaluated at [facility] x 1 week (with MRI), nothing abnormal.  She then left there and came to [address] [place] [address] where she had LP in the ER, which did not show any abnormalities. She says the symptoms gradually resolved on their own. Reviewed MRI Brain, C-spine, T-spine, and L-spine images.  MRI Brain: normal.  MRI C-spine: minimal disc bulges at C4-5, C5-6, C6-7, otherwise normals study. MRI T-spine: mild disc space narrowing and minimal disc protrusions at T3-4, T6-7, mild disc desiccation at T11-T12 without loss of disc height, otherwise normal study. MRI L-spine:  Normal.        Complete Review of Systems: reviewed on admission H&P    Allergies: Allergies   Allergen Reactions    Codeine Other (comments)     Tachycardia    Sulfa (Sulfonamide Antibiotics) Itching    Bactrim [Sulfamethoprim Ds] Itching    Chocolate Flavor Other (comments)     Pt reports migraines    Clindamycin Other (comments)     Chest tightness    Compazine [Prochlorperazine Edisylate] Other (comments)     Neck and jaw spasm  And difficultly swallow     Morphine Hives    Peanut Other (comments)     migraines    Reglan [Metoclopramide] Nausea and Vomiting and Vertigo     Sleep disorder and shaking    Sulfur Swelling     Throat closes.  Zofran [Ondansetron Hcl (Pf)] Hives    Metaxalone Palpitations       Outpatient Meds  No current facility-administered medications on file prior to encounter. Current Outpatient Prescriptions on File Prior to Encounter   Medication Sig Dispense Refill    FLUoxetine (PROZAC) 20 mg capsule Take 20 mg by mouth nightly.  etonogestrel (NEXPLANON) 68 mg impl by SubDERmal route once.  tiZANidine (ZANAFLEX) 4 mg tablet Take 4 mg by mouth every six (6) hours as needed.  1-2 tabs QHS prn for back  pain          Inpatient Meds    Current Facility-Administered Medications:     gabapentin (NEURONTIN) capsule 300 mg, 300 mg, Oral, Q8H, Pancho Trent MD, 300 mg at 07/09/18 1316    traMADol (ULTRAM) tablet 100 mg, 100 mg, Oral, Q6H, Trinidad Leone MD, 100 mg at 07/09/18 1316    polyethylene glycol (MIRALAX) packet 17 g, 17 g, Oral, BID, Trinidad eLone MD    senna-docusate (PERICOLACE) 8.6-50 mg per tablet 1 Tab, 1 Tab, Oral, BID, Trinidad Leone MD    FLUoxetine (PROzac) capsule 20 mg, 20 mg, Oral, QHS, Carollynn Patience, MD    tiZANidine (ZANAFLEX) tablet 4 mg, 4 mg, Oral, QID, Ivette Powell MD    sodium chloride (NS) flush 5-10 mL, 5-10 mL, IntraVENous, Q8H, Miranda Pringle MD, 10 mL at 07/09/18 0600    sodium chloride (NS) flush 5-10 mL, 5-10 mL, IntraVENous, PRN, Miranda Pringle MD    acetaminophen (TYLENOL) tablet 650 mg, 650 mg, Oral, Q4H PRN, Miranda Pringle MD, 650 mg at 07/09/18 0340    enoxaparin (LOVENOX) injection 40 mg, 40 mg, SubCUTAneous, Q24H, Miranda Pringle MD, 40 mg at 07/08/18 1821    polyethylene glycol (MIRALAX) packet 17 g, 17 g, Oral, BID, Miranda Pringle MD, 17 g at 07/09/18 0830    Past Medical History:   Diagnosis Date    Headache     Headache(784.0)     Obesity (BMI 30-39. 9) 7/9/2018    Seizures (HCC)      Past Surgical History:   Procedure Laterality Date    ENDOSCOPY, COLON, DIAGNOSTIC      HX APPENDECTOMY      HX CHOLECYSTECTOMY      HX GI      HX GYN       Family History   Problem Relation Age of Onset    Hypertension Father     Diabetes Maternal Grandmother     Coronary Artery Disease Maternal Grandfather     Diabetes Maternal Grandfather     Diabetes Paternal Grandfather      Social History     Social History    Marital status: SINGLE     Spouse name: N/A    Number of children: N/A    Years of education: N/A     Occupational History    Not on file. Social History Main Topics    Smoking status: Current Every Day Smoker    Smokeless tobacco: Never Used    Alcohol use Yes    Drug use: No    Sexual activity: Not Currently     Other Topics Concern    Not on file     Social History Narrative       PHYSICAL EXAM  Patient Vitals for the past 8 hrs:   Temp Pulse Resp BP SpO2   07/09/18 1427 - 68 - - -   07/09/18 1123 98.9 °F (37.2 °C) 64 17 122/68 98 %   07/09/18 0825 98.1 °F (36.7 °C) 63 17 141/81 97 %         General:  Alert, cooperative, Emotional/ Crying due to back pain   Head:  Normocephalic, atraumatic.    Eyes:  Conjunctivae/corneas clear   Lungs:  Heart:  Non labored breathing  Regular rate, rhythm   Extremities: No edema.    Skin: No rashes    Neurologic Exam       Language: normal  Memory:  Alert, oriented to person, place, situation    Cranial Nerves:  I: smell Not tested   II: visual fields Full to confrontation   II: pupils Equal, round, reactive to light   II: optic disc No papilledema   III,VII: ptosis none   III,IV,VI: extraocular muscles  normal   V: facial light touch sensation  normal   VII: facial muscle function  symmetric   VIII: hearing symmetric   IX: soft palate elevation  normal   XI: sternocleidomastoid strength 5/5   XII: tongue  midline      Motor:  Upper extremities: 5/5   Lower extremities: 5/5 on right, 4+/ 5 on left (limited d/t back pain)    Sensory:  No spinal sensory level on either side  Intact LT, prick, temperature, vibration in both arms and right leg  Pt denies any Lt, prick, temp, or vibration in left leg    Cerebellar: no rest, postural, or intention tremor  Normal FNF bilaterally  Reflexes: 3+ throughout  Plantar response: neutral bilaterally    Gait: cannot test due to back pain  Romberg cannot test due to back pain     -----------------------------    Recent Results (from the past 12 hour(s))   TSH 3RD GENERATION    Collection Time: 07/09/18  3:52 AM   Result Value Ref Range    TSH 2.47 0.36 - 3.74 uIU/mL   T4, FREE    Collection Time: 07/09/18  3:52 AM   Result Value Ref Range    T4, Free 0.9 0.8 - 1.5 NG/DL       Hospital Problems  Date Reviewed: 7/9/2018          Codes Class Noted POA    Urinary incontinence ICD-10-CM: R32  ICD-9-CM: 788.30  7/9/2018 Yes        Bradycardia, sinus ICD-10-CM: R00.1  ICD-9-CM: 427.89  7/9/2018 Yes        Obesity (BMI 30-39.9) (Chronic) ICD-10-CM: E66.9  ICD-9-CM: 278.00  7/9/2018 Yes        * (Principal)Left leg weakness ICD-10-CM: R29.898  ICD-9-CM: 729.89  7/8/2018 Yes        Numbness in left leg ICD-10-CM: R20.0  ICD-9-CM: 782.0  7/8/2018 Yes        Seizures (HCC) (Chronic) ICD-10-CM: R56.9  ICD-9-CM: 780.39  Unknown Yes        Nonintractable migraine (Chronic) ICD-10-CM: G43.009  ICD-9-CM: 346.10  7/8/2018 Yes                Impression and Plan      34 y.o. female with acute onset left leg weakness, left leg numbness, severe back pain, right leg tingling, urinary incontinence. MRIs Brain and entire spine do not show any abnormalities to account for symptoms. Similar episode 6-7 years ago (LP and MRI) with completely normal workup. Discussed with pt/ family that I recommend we check lumbar puncture to evaluate for any evidence of inflammation that could suggest transverse myelitis. They were agreeable to that so I entered order for that to be done through radiology under x-ray. If LP suggests transverse myelitis, will add additional labs to further workup. Added Gabapentin 300 mg TID for neuropathic pain. Will f/u tomorrow. Thank you for this consult.       Rima Melo MD  07/09/18

## 2018-07-09 NOTE — PROGRESS NOTES
Occupational Therapy Note:  Orders received and appreciated. Chart reviewed. Educated pt and her family on role of OT. Pt declined stating she was not going to do anything right now because she finally got pain medication a few minutes ago and she was starting to get some relief. Encouraged pt's participation and she still declined. Will follow up at later time as able for OT eval.  Thank you for the consult.   Devyn Henriquez, OTR/L, CBIS

## 2018-07-09 NOTE — PROGRESS NOTES
[facility] [place] [place] [address] 
[address] [facility], [address] [place] [address] 
[phone] Medical Progress Note NAME: Eden Dickerson :  1990 MRM:  [id number] Date/Time: 2018  1:47 PM 
 
 
  
Subjective: Chief Complaint:  Pain: low back, severe, constant, no relief with interventions so far. Patient frustrated and tearful during my entire interview. ROS: 
(bold if positive, if negative) Tolerating Diet Objective:  
 
 
Vitals:  
 
 
  
Last 24hrs VS reviewed since prior progress note. Most recent are: 
 
Visit Vitals  /68 (BP 1 Location: Left arm, BP Patient Position: At rest)  Pulse 64  Temp 98.9 °F (37.2 °C)  Resp 17  Ht 5' 6\" (1.676 m)  Wt 99.3 kg (219 lb)  SpO2 98%  Breastfeeding No  
 BMI 35.35 kg/m2 SpO2 Readings from Last 6 Encounters:  
18 98% 18 99% 18 96% 10/30/17 97% 10/21/16 98% 10/21/16 96% Intake/Output Summary (Last 24 hours) at 18 1347 Last data filed at 18 1451 Gross per 24 hour Intake                0 ml Output               50 ml Net              -50 ml Exam:  
 
Physical Exam: 
 
Gen:  Well-developed, obese, in no acute distress HEENT:  Pink conjunctivae, PERRL, hearing intact to voice, moist mucous membranes Neck:  Supple, without masses, thyroid non-tender Resp:  No accessory muscle use, clear breath sounds without wheezes rales or rhonchi 
Card:  No murmurs, normal S1, S2 without thrills, bruits or peripheral edema Abd:  Soft, non-tender, non-distended, normoactive bowel sounds are present, no palpable organomegaly and no detectable hernias Lymph:  No cervical or inguinal adenopathy Musc:  No cyanosis or clubbing Skin:  No rashes or ulcers, skin turgor is good Neuro:  Cranial nerves are grossly intact, no focal motor weakness, follows commands appropriately Psych:  Good insight, oriented to person, place and time, alert, tearful, very emotional 
 
  
Medications Reviewed: (see below) Lab Data Reviewed: (see below) 
 
______________________________________________________________________ Medications:  
 
Current Facility-Administered Medications Medication Dose Route Frequency  gabapentin (NEURONTIN) capsule 300 mg  300 mg Oral Q8H  
 traMADol (ULTRAM) tablet 100 mg  100 mg Oral Q6H  
 polyethylene glycol (MIRALAX) packet 17 g  17 g Oral BID  senna-docusate (PERICOLACE) 8.6-50 mg per tablet 1 Tab  1 Tab Oral BID  FLUoxetine (PROzac) capsule 20 mg  20 mg Oral QHS  sodium chloride (NS) flush 5-10 mL  5-10 mL IntraVENous Q8H  
 sodium chloride (NS) flush 5-10 mL  5-10 mL IntraVENous PRN  
 acetaminophen (TYLENOL) tablet 650 mg  650 mg Oral Q4H PRN  
 enoxaparin (LOVENOX) injection 40 mg  40 mg SubCUTAneous Q24H  polyethylene glycol (MIRALAX) packet 17 g  17 g Oral BID Lab Review:  
 
Recent Labs  
   07/08/18 
 1636 WBC  10.9 HGB  13.8 HCT  41.4 PLT  221 Recent Labs  
   07/08/18 
 1636 NA  141  
K  3.9 CL  108 CO2  23 GLU  112* BUN  15  
CREA  0.80 CA  8.7 ALB  3.5 TBILI  0.5 SGOT  14* ALT  21 Lab Results Component Value Date/Time Glucose (POC) 96 06/20/2012 01:55 AM  
 Glucose (POC) 93 05/17/2012 04:56 PM  
 
No results for input(s): PH, PCO2, PO2, HCO3, FIO2 in the last 72 hours. No results for input(s): INR in the last 72 hours. No lab exists for component: INREXT Lab Results Component Value Date/Time Specimen Description: URINE 07/11/2012 12:00 PM  
 Specimen Description: URINE 06/20/2012 01:58 AM  
 Specimen Description: CEREBROSPINAL FLUID 04/04/2012 03:20 PM  
 
Lab Results Component Value Date/Time  Culture result: MIXED UROGENITAL LUDIVINA ISOLATED 10/21/2016 04:59 PM  
 Culture result: MIXED UROGENITAL LUDIVINA ISOLATED 09/26/2016 12:53 PM  
 Culture result: MIXED SKIN LUDIVINA ISOLATED 07/11/2012 12:00 PM  
 
 
 
 Assessment:  
 
Principal Problem: 
  Left leg weakness (7/8/2018) Active Problems: 
  Numbness in left leg (7/8/2018) Seizures ([address] () Nonintractable migraine (7/8/2018) Urinary incontinence (7/9/2018) Bradycardia, sinus (7/9/2018) Plan:  
 
Principal Problem: 
  Left leg weakness (7/8/2018)/Numbness in left leg (7/8/2018)/Back pain 
 - no clear etiology 
 - Orthopedic work up complete and negative 
 - brain MRI unremarkable 
 - no indication for opiate therapy, discussed at length with family 
 - will try Ultram and Zanaflex - LP per Neurology Active Problems: 
  Seizures (HCC) () 
 - no recent seizures 
 - not on chronic AED therapy 
 - outpatient follow up with Neurology Nonintractable migraine (7/8/2018) 
 - watch post-LP Urinary incontinence (7/9/2018) - no urinary retention that I can find documented by bladder scan - Purewick, no Mccrary indicated Bradycardia, sinus (7/9/2018) - TSH normal, EKG NSR, NSST, visualized by me  
 - asymptomatic, monitor on telemetry Total time spent in patient care: 35 minutes Care Plan discussed with: Patient, Family, Care Manager, Nursing Staff and Dr. Carmelita Hyman Discussed:  Code Status, Care Plan and D/C Planning Prophylaxis:  Lovenox Disposition:  Home w/Family 
        
___________________________________________________ Attending Physician: Brian Connor MD

## 2018-07-09 NOTE — PROGRESS NOTES
Bedside shift change report given to Phase [employer] (oncoming nurse) by Palmer Edward (offgoing nurse). Report included the following information SBAR, Kardex, Procedure Summary, Intake/Output and MAR.

## 2018-07-09 NOTE — PROGRESS NOTES
Orthopaedic Progress Note Post Op day: * No surgery found * 2018 11:40 AM  
 
Patient: Whit Lozano MRN: [id number]  SSN: [id number] YOB: 1990  Age: 34 y.o. Sex: female Admit date:  2018 Date of Surgery:  * No surgery found * Procedures:   
Admitting Physician:  Carol Ann Ervin MD  
Surgeon:  * Surgery not found * Consulting Physician(s): Treatment Team: Attending Provider: Kaylyn Calderon MD; Consulting Provider: Jacqueline Medina; Surgeon: Rita Muse MD; Consulting Provider: Natalio Gilmore MD; Consulting Provider: Carol Ann Ervin MD 
 
SUBJECTIVE: 
  
Whit Lozano is a 34 y.o. female resting in bed with complaints of pain in her low back. She states she has had this pain since her fall on Thursday. Pt states her left LE is still numb from her waist to her toes. Her right leg is numb from her mid thigh up to her waist. She has a catheter intact. Review of MRI's of C/T/L spine show no spinal cord compression or disk herniation. OBJECTIVE: 
 
  
Physical Exam: 
General: Alert, cooperative, no distress. Respiratory: Respirations unlabored Neurological/ Musculoskeletal:  Positive SLR bilaterally. Decreased sensation of light and sharp touch from LLE from left side of waist to left toes (entire leg and circumference of leg). Able to lift leg off of bed, 5/5 strength with DF, PF, EHL, FHL. Right LE 5/5 strength DF, PF, EHL, FHL. Musculoskeletal: Calves soft, supple, non-tender upon palpation. Vital Signs:  
  
Patient Vitals for the past 8 hrs: 
 BP Temp Pulse Resp SpO2  
18 1123 122/68 98.9 °F (37.2 °C) 64 17 98 % 18 0825 141/81 98.1 °F (36.7 °C) 63 17 97 % 18 0700 - - (!) 49 - - Temp (24hrs), Av.3 °F (36.8 °C), Min:98.1 °F (36.7 °C), Max:98.9 °F (37.2 °C) Labs:  
  
 
Recent Labs  
   18 
 1636 HCT  41.4 HGB  13.8 Lab Results Component Value Date/Time Sodium 141 07/08/2018 04:36 PM  
 Potassium 3.9 07/08/2018 04:36 PM  
 Chloride 108 07/08/2018 04:36 PM  
 CO2 23 07/08/2018 04:36 PM  
 Glucose 112 (H) 07/08/2018 04:36 PM  
 BUN 15 07/08/2018 04:36 PM  
 Creatinine 0.80 07/08/2018 04:36 PM  
 Calcium 8.7 07/08/2018 04:36 PM  
 
 
PT/OT:  
 
  
  
  
 
Patient mobility ASSESSMENT / PLAN:  
Principal Problem: 
  Left leg weakness (7/8/2018) Active Problems: 
  Numbness in left leg (7/8/2018) Seizures ([address] () Nonintractable migraine (7/8/2018) 34year old female with decreased sensation/ weakness in LLE with urinary incontinence MRI's of C/T/L spine without evidence of cord compression/ compromise or disk herniation No Orthopedic surgical intervention recommended or necessary. Neuro to evaluate? Please reconsult for any changes in patient status. Signed By: 
YESSI Reina [address] [facility]

## 2018-07-09 NOTE — PROGRESS NOTES
Spiritual Care Partner Volunteer visited patient in 5 Med Surg on 7/9/18.   Documented by: Rosie Ibarra [address] [place] ([id number])

## 2018-07-10 LAB
ANION GAP SERPL CALC-SCNC: 9 MMOL/L (ref 5–15)
BASOPHILS # BLD: 0 K/UL (ref 0–0.1)
BASOPHILS NFR BLD: 0 % (ref 0–1)
BUN SERPL-MCNC: 16 MG/DL (ref 6–20)
BUN/CREAT SERPL: 17 (ref 12–20)
CALCIUM SERPL-MCNC: 8.6 MG/DL (ref 8.5–10.1)
CHLORIDE SERPL-SCNC: 108 MMOL/L (ref 97–108)
CO2 SERPL-SCNC: 26 MMOL/L (ref 21–32)
CREAT SERPL-MCNC: 0.93 MG/DL (ref 0.55–1.02)
CRP SERPL HS-MCNC: 0.4 MG/L
DIFFERENTIAL METHOD BLD: ABNORMAL
EOSINOPHIL # BLD: 0 K/UL (ref 0–0.4)
EOSINOPHIL NFR BLD: 1 % (ref 0–7)
ERYTHROCYTE [DISTWIDTH] IN BLOOD BY AUTOMATED COUNT: 12.8 % (ref 11.5–14.5)
GLUCOSE SERPL-MCNC: 83 MG/DL (ref 65–100)
HCT VFR BLD AUTO: 39.6 % (ref 35–47)
HERPES SIMPLEX VIRUS, CSF, UHSPT: NORMAL
HGB BLD-MCNC: 13 G/DL (ref 11.5–16)
IMM GRANULOCYTES # BLD: 0.1 K/UL (ref 0–0.04)
IMM GRANULOCYTES NFR BLD AUTO: 1 % (ref 0–0.5)
LYMPHOCYTES # BLD: 3.9 K/UL (ref 0.8–3.5)
LYMPHOCYTES NFR BLD: 46 % (ref 12–49)
MCH RBC QN AUTO: 31.3 PG (ref 26–34)
MCHC RBC AUTO-ENTMCNC: 32.8 G/DL (ref 30–36.5)
MCV RBC AUTO: 95.4 FL (ref 80–99)
MONOCYTES # BLD: 0.5 K/UL (ref 0–1)
MONOCYTES NFR BLD: 6 % (ref 5–13)
NEUTS SEG # BLD: 3.9 K/UL (ref 1.8–8)
NEUTS SEG NFR BLD: 46 % (ref 32–75)
NRBC # BLD: 0 K/UL (ref 0–0.01)
NRBC BLD-RTO: 0 PER 100 WBC
PLATELET # BLD AUTO: 205 K/UL (ref 150–400)
PMV BLD AUTO: 11 FL (ref 8.9–12.9)
POTASSIUM SERPL-SCNC: 4.2 MMOL/L (ref 3.5–5.1)
RBC # BLD AUTO: 4.15 M/UL (ref 3.8–5.2)
SODIUM SERPL-SCNC: 143 MMOL/L (ref 136–145)
WBC # BLD AUTO: 8.4 K/UL (ref 3.6–11)

## 2018-07-10 PROCEDURE — 86235 NUCLEAR ANTIGEN ANTIBODY: CPT | Performed by: PSYCHIATRY & NEUROLOGY

## 2018-07-10 PROCEDURE — 74011250637 HC RX REV CODE- 250/637: Performed by: FAMILY MEDICINE

## 2018-07-10 PROCEDURE — 74011250637 HC RX REV CODE- 250/637: Performed by: INTERNAL MEDICINE

## 2018-07-10 PROCEDURE — 82525 ASSAY OF COPPER: CPT | Performed by: PSYCHIATRY & NEUROLOGY

## 2018-07-10 PROCEDURE — 85025 COMPLETE CBC W/AUTO DIFF WBC: CPT | Performed by: PSYCHIATRY & NEUROLOGY

## 2018-07-10 PROCEDURE — 97165 OT EVAL LOW COMPLEX 30 MIN: CPT

## 2018-07-10 PROCEDURE — 83520 IMMUNOASSAY QUANT NOS NONAB: CPT | Performed by: PSYCHIATRY & NEUROLOGY

## 2018-07-10 PROCEDURE — G8987 SELF CARE CURRENT STATUS: HCPCS

## 2018-07-10 PROCEDURE — G8988 SELF CARE GOAL STATUS: HCPCS

## 2018-07-10 PROCEDURE — 86618 LYME DISEASE ANTIBODY: CPT | Performed by: PSYCHIATRY & NEUROLOGY

## 2018-07-10 PROCEDURE — 97530 THERAPEUTIC ACTIVITIES: CPT

## 2018-07-10 PROCEDURE — 86038 ANTINUCLEAR ANTIBODIES: CPT | Performed by: PSYCHIATRY & NEUROLOGY

## 2018-07-10 PROCEDURE — 74011000258 HC RX REV CODE- 258: Performed by: PSYCHIATRY & NEUROLOGY

## 2018-07-10 PROCEDURE — 99218 HC RM OBSERVATION: CPT

## 2018-07-10 PROCEDURE — 74011250636 HC RX REV CODE- 250/636: Performed by: PSYCHIATRY & NEUROLOGY

## 2018-07-10 PROCEDURE — 74011250637 HC RX REV CODE- 250/637: Performed by: PSYCHIATRY & NEUROLOGY

## 2018-07-10 PROCEDURE — 86141 C-REACTIVE PROTEIN HS: CPT | Performed by: PSYCHIATRY & NEUROLOGY

## 2018-07-10 PROCEDURE — 83655 ASSAY OF LEAD: CPT | Performed by: PSYCHIATRY & NEUROLOGY

## 2018-07-10 PROCEDURE — 80048 BASIC METABOLIC PNL TOTAL CA: CPT | Performed by: INTERNAL MEDICINE

## 2018-07-10 PROCEDURE — 97116 GAIT TRAINING THERAPY: CPT

## 2018-07-10 PROCEDURE — 86225 DNA ANTIBODY NATIVE: CPT | Performed by: PSYCHIATRY & NEUROLOGY

## 2018-07-10 PROCEDURE — 77030038269 HC DRN EXT URIN PURWCK BARD -A

## 2018-07-10 PROCEDURE — 36415 COLL VENOUS BLD VENIPUNCTURE: CPT | Performed by: INTERNAL MEDICINE

## 2018-07-10 PROCEDURE — 74011250636 HC RX REV CODE- 250/636: Performed by: INTERNAL MEDICINE

## 2018-07-10 PROCEDURE — 97162 PT EVAL MOD COMPLEX 30 MIN: CPT

## 2018-07-10 RX ORDER — GABAPENTIN 300 MG/1
600 CAPSULE ORAL 3 TIMES DAILY
Status: DISCONTINUED | OUTPATIENT
Start: 2018-07-10 | End: 2018-07-13

## 2018-07-10 RX ORDER — LIDOCAINE 50 MG/G
3 PATCH TOPICAL EVERY 24 HOURS
Status: DISCONTINUED | OUTPATIENT
Start: 2018-07-10 | End: 2018-07-12

## 2018-07-10 RX ORDER — HYDROMORPHONE HYDROCHLORIDE 2 MG/1
1 TABLET ORAL
Status: DISCONTINUED | OUTPATIENT
Start: 2018-07-10 | End: 2018-07-11

## 2018-07-10 RX ADMIN — Medication 10 ML: at 21:09

## 2018-07-10 RX ADMIN — FLUOXETINE 20 MG: 20 CAPSULE ORAL at 21:06

## 2018-07-10 RX ADMIN — HYDROMORPHONE HYDROCHLORIDE 1 MG: 2 TABLET ORAL at 17:17

## 2018-07-10 RX ADMIN — GABAPENTIN 600 MG: 300 CAPSULE ORAL at 17:17

## 2018-07-10 RX ADMIN — HYDROMORPHONE HYDROCHLORIDE 1 MG: 2 TABLET ORAL at 21:18

## 2018-07-10 RX ADMIN — ACETAMINOPHEN 650 MG: 325 TABLET ORAL at 06:58

## 2018-07-10 RX ADMIN — TRAMADOL HYDROCHLORIDE 100 MG: 50 TABLET, FILM COATED ORAL at 03:02

## 2018-07-10 RX ADMIN — GABAPENTIN 300 MG: 300 CAPSULE ORAL at 06:58

## 2018-07-10 RX ADMIN — Medication 10 ML: at 14:47

## 2018-07-10 RX ADMIN — POLYETHYLENE GLYCOL (3350) 17 G: 17 POWDER, FOR SOLUTION ORAL at 17:16

## 2018-07-10 RX ADMIN — TIZANIDINE 4 MG: 2 TABLET ORAL at 09:42

## 2018-07-10 RX ADMIN — SODIUM CHLORIDE 1000 MG: 900 INJECTION, SOLUTION INTRAVENOUS at 09:47

## 2018-07-10 RX ADMIN — POLYETHYLENE GLYCOL (3350) 17 G: 17 POWDER, FOR SOLUTION ORAL at 09:45

## 2018-07-10 RX ADMIN — Medication 10 ML: at 06:59

## 2018-07-10 RX ADMIN — POLYETHYLENE GLYCOL (3350) 17 G: 17 POWDER, FOR SOLUTION ORAL at 09:43

## 2018-07-10 RX ADMIN — STANDARDIZED SENNA CONCENTRATE AND DOCUSATE SODIUM 1 TABLET: 8.6; 5 TABLET, FILM COATED ORAL at 09:42

## 2018-07-10 RX ADMIN — TRAMADOL HYDROCHLORIDE 100 MG: 50 TABLET, FILM COATED ORAL at 09:42

## 2018-07-10 RX ADMIN — GABAPENTIN 600 MG: 300 CAPSULE ORAL at 21:06

## 2018-07-10 RX ADMIN — ENOXAPARIN SODIUM 40 MG: 40 INJECTION SUBCUTANEOUS at 17:16

## 2018-07-10 NOTE — PROGRESS NOTES
Neurology Progress Note    Interval Hx:      Follow up: Acute onset left leg weakness, severe lower back pain, right leg tingling/ paresthesias, urinary retention, generalized hyperreflexia    Urinary cathter removed, now using purewik catheter. Reports symptoms are unchanged: severe back pain, right leg tingling, left leg numb and weak. Says feels a little lightheaded and naseated since LP. Trying to stay flat, drink a lot of caffeine. So far CSF has returned normal (WBC 4, Protein 38, Gram stain negative to date), other than few hundred RBCs which is likely d/t traumatic tap. MS panel and HSV PCR pending. CRP, CBC normal (other than mildly elevated Abs Lymphocytes 3.9). Pending labs (to look for other etiologies): ISABELLA, Copper, anti-ds DNA, urine heavy metals, lyme antibodies, SSA, SSB, NMO antibodies.          Current Facility-Administered Medications:     methylPREDNISolone ((Solu-MEDROL) 1,000 mg in 0.9% sodium chloride (MBP/ADV) 100 mL, 1 g, IntraVENous, DAILY, Pancho Trent MD, Last Rate: 100 mL/hr at 07/10/18 0947, 1,000 mg at 07/10/18 0947    lidocaine (LIDODERM) 5 % patch 3 Patch, 3 Patch, TransDERmal, Q24H, Irina Muñoz MD    gabapentin (NEURONTIN) capsule 300 mg, 300 mg, Oral, Q8H, Pancho Trent MD, 300 mg at 07/10/18 0658    FLUoxetine (PROzac) capsule 20 mg, 20 mg, Oral, QHS, Irina Muñoz MD, 20 mg at 07/09/18 2153    sodium chloride (NS) flush 5-10 mL, 5-10 mL, IntraVENous, Q8H, Danie Merino MD, 10 mL at 07/10/18 0659    sodium chloride (NS) flush 5-10 mL, 5-10 mL, IntraVENous, PRN, Danie Merino MD, 10 mL at 07/09/18 1400    acetaminophen (TYLENOL) tablet 650 mg, 650 mg, Oral, Q4H PRN, Danie Merino MD, 650 mg at 07/10/18 0658    enoxaparin (LOVENOX) injection 40 mg, 40 mg, SubCUTAneous, Q24H, Danie Merino MD, 40 mg at 07/09/18 1832    polyethylene glycol (MIRALAX) packet 17 g, 17 g, Oral, BID, Danie Merino MD, 17 g at 07/10/18 0976    Physical Exam    Patient Vitals for the past 12 hrs:   Temp Pulse Resp BP SpO2   07/10/18 0716 98 °F (36.7 °C) 77 17 122/74 98 %   07/10/18 0401 97.8 °F (36.6 °C) 60 16 103/62 94 %   07/09/18 2357 97.8 °F (36.6 °C) 62 16 114/56 97 %       Awake, resting in bed, appears calm, father at bedside    Focused Neurological Exam   Extraocular movement intact bilaterally  Face appears symmetric  Hearing / Language intact to casual conversation    Sensory:   absent LT, pinprick in left leg  intact LT, prick right leg and both arms    Motor:   5/5 strength both arms and right leg  Left leg with at least 3/5 strength, some component of giveaway or pain-related weakness    DTRs: 2+ biceps, 3+ patellars, 1+ achilles, neutral plantar responses  Gait: not tested      Impression/ Plan    34 y.o. female with   Acute onset left leg weakness, severe lower back pain, right leg tingling/ paresthesias, urinary retention, generalized hyperreflexia    MRIs Brain, Cervical, Thoracic, Lumbar spine  (all +/- contrast) have not shown etiology (specifically no cord or CNS lesions, no spinal stenosis, no disc herniation)     LP with minimal RBCs (not suggestive of SAH) and normal WBCs, Protein, Glucose, Gram stain  CSF MS Panel and HSV PCR pending and other labs pending    D/w patient-father that all testing so far has been normal, no neurologic diagnosis yet  Continue IV steroids for total of 3 days  Will increase Gabapentin to 600 mg TID  Labs may take up to 1 week to result  Will continue following      Signed By: Arcenio Bardales MD     July 10, 2018

## 2018-07-10 NOTE — PROGRESS NOTES
1958  Pt request to shower. Dr. Patricia Fuller said patient cannot shower tonight because she must be able to work with PT/OT due to L leg weakness. Will notify patient.

## 2018-07-10 NOTE — PROGRESS NOTES
Reason for Admission:   Left leg weakness                   RRAT Score:      3               Plan for utilizing home health:   Pt will be evaluated by PT and OT. Awaiting recommendations                       Likelihood of Readmission:  Low                         Transition of Care Plan:   Met with pt for initial assessment. Pt resides with her boyfriend and his mother in a one story house with three entry steps. PTA pt was independent with ADLs and working full-time. She has Rx coverage. Pharmacy is [facility] on [address]. Pt has no prior HH hx. She owns crutches. PCP is Josefa Morataya NP. No discharge needs have been identified. CM will follow and assist as needed. Pt has been provided a copy of the observation letter and information on [facility]. Rey Tierney LCSW    Care Management Interventions  PCP Verified by CM: Yes (Josefa Morataya NP)  Discharge Durable Medical Equipment: No  Physical Therapy Consult: Yes  Occupational Therapy Consult: Yes  Speech Therapy Consult: No  Current Support Network:  Other (Lives with boyfriend and his mother)  Confirm Follow Up Transport: Family  Plan discussed with Pt/Family/Caregiver: Yes  Discharge Location  Discharge Placement: Home

## 2018-07-10 NOTE — PROGRESS NOTES
Problem: Mobility Impaired (Adult and Pediatric)  Goal: *Acute Goals and Plan of Care (Insert Text)  Physical Therapy Goals  Initiated 7/10/2018  1. Patient will move from supine to sit and sit to supine  in bed with modified independence within 7 day(s). 2.  Patient will transfer from bed to chair and chair to bed with modified independence using the least restrictive device within 7 day(s). 3.  Patient will perform sit to stand with supervision/set-up within 7 day(s). 4.  Patient will ambulate with minimal assistance/contact guard assist for 150 feet with the least restrictive device within 7 day(s). 5.  Patient will ascend/descend 4 stairs with 2 handrail(s) with minimal assistance/contact guard assist within 7 day(s). physical Therapy EVALUATION  Patient: Shawn Baires (63 y.o. female)  Date: 7/10/2018  Primary Diagnosis: Left leg weakness  Left leg weakness        Precautions:   Back, Fall (L knee buckling)    ASSESSMENT :  Based on the objective data described below, the patient presents with admission due to LLE weakness with severe LBK pain and urinary incontinence. Pt also stating no bowel movement since last Wed, 6days ago. Father present and confirming same. Pt stating that she had similar occurrence of symptoms 7 years ago with gradual return. Currently stating that LLE buckled ~3days ago upon return from the beach. Pt received supine in bed. Pleasant and cooperative and working PTA. Demonstrates LLE weakness and lost sensation LLE with blind testing. Pt able to do B heel slides, yet DF weak and decreased on L. supine Pt incontinent of urine thus brief applied for mobility. Pt educated with log roll and performed with CGA. Supine to sit with Min A although with back pain. Educated with RW use and sit to stand with Min to Mod Ax2. Increased stance on RLE. Gait forward, retro and side for 5-6' with RW and Mod to Min ax2.   Stated use of knee immobilizer on LLE for farther distance due to fall risk and L knee buckling hx. Pt stating no significant pain relief per all positions except supine. Pain increased with supine to sit, sitting and standing. Returned to supine with HOB at 45degrees per pt request due to back pain more upright. Lunch tray provided. Diagnosis in progress. Testing negative for MRI and lumbar puncture thus far. Will likely need IPrehab upon discharge. Patient will benefit from skilled intervention to address the above impairments. Patients rehabilitation potential is considered to be Good  Factors which may influence rehabilitation potential include:   []         None noted  []         Mental ability/status  [x]         Medical condition  []         Home/family situation and support systems  []         Safety awareness  []         Pain tolerance/management  []         Other:      PLAN :  Recommendations and Planned Interventions:  [x]           Bed Mobility Training             [x]    Neuromuscular Re-Education  [x]           Transfer Training                   []    Orthotic/Prosthetic Training  [x]           Gait Training                         []    Modalities  [x]           Therapeutic Exercises           []    Edema Management/Control  [x]           Therapeutic Activities            [x]    Patient and Family Training/Education  []           Other (comment):    Frequency/Duration: Patient will be followed by physical therapy  5 times a week to address goals. Discharge Recommendations: Inpatient Rehab  Further Equipment Recommendations for Discharge: has RW     SUBJECTIVE:   Patient stated nothing makes it feel better.     OBJECTIVE DATA SUMMARY:   HISTORY:    Past Medical History:   Diagnosis Date    Headache     Headache(784.0)     Obesity (BMI 30-39. 9) 7/9/2018    Seizures (HCC)      Past Surgical History:   Procedure Laterality Date    ENDOSCOPY, COLON, DIAGNOSTIC      HX APPENDECTOMY      HX CHOLECYSTECTOMY      HX GI      HX GYN       Prior Level of Function/Home Situation: lives with boyfriend; working in [employer]; independent  Personal factors and/or comorbidities impacting plan of care: per above    Home Situation  Home Environment: Private residence  # Steps to Enter: 3  Rails to Enter: Yes  Hand Rails : Bilateral  One/Two Story Residence: One story  Living Alone: No  Support Systems: Spouse/Significant Other/Partner  Patient Expects to be Discharged to[de-identified] Unknown  Current DME Used/Available at Home: Walker, rolling, Cane, straight, Crutches  Tub or Shower Type: Tub/Shower combination    EXAMINATION/PRESENTATION/DECISION MAKING:   Critical Behavior:  Neurologic State: Alert  Orientation Level: Oriented X4  Cognition: Follows commands  Safety/Judgement: Insight into deficits, Fall prevention  Hearing: Auditory  Auditory Impairment: None  Skin:  IV  Edema: ENL  Range Of Motion:  AROM: Generally decreased, functional (BLE L>R; decreased L DF)                       Strength:    Strength: Generally decreased, functional (BLE's; L weaker then R)                    Tone & Sensation:   Tone: Normal              Sensation: Impaired (LLE numb)               Coordination:  Coordination: Generally decreased, functional  Vision:      Functional Mobility:  Bed Mobility:  Rolling:  (log)  Supine to Sit: Assist x2; Moderate assistance;Minimum assistance  Sit to Supine: Moderate assistance;Assist x2;Minimum assistance  Scooting: Supervision  Transfers:  Sit to Stand: Moderate assistance;Assist x2  Stand to Sit: Minimum assistance;Assist x2                       Balance:   Sitting: Intact; High guard  Standing: Impaired; With support  Standing - Static: Constant support; Fair  Standing - Dynamic : Poor  Ambulation/Gait Training:                                                               Therapeutic Exercises:    Ankle pumping and heels slides    Functional Measure:  Barthel Index:    Bathin  Bladder: 0  Bowels: 0  Groomin  Dressin  Feeding: 10  Mobility: 0  Stairs: 0  Toilet Use: 5  Transfer (Bed to Chair and Back): 5  Total: 25       Barthel and G-code impairment scale:  Percentage of impairment CH  0% CI  1-19% CJ  20-39% CK  40-59% CL  60-79% CM  80-99% CN  100%   Barthel Score 0-100 100 99-80 79-60 59-40 20-39 1-19   0   Barthel Score 0-20 20 17-19 13-16 9-12 5-8 1-4 0      The Barthel ADL Index: Guidelines  1. The index should be used as a record of what a patient does, not as a record of what a patient could do. 2. The main aim is to establish degree of independence from any help, physical or verbal, however minor and for whatever reason. 3. The need for supervision renders the patient not independent. 4. A patient's performance should be established using the best available evidence. Asking the patient, friends/relatives and nurses are the usual sources, but direct observation and common sense are also important. However direct testing is not needed. 5. Usually the patient's performance over the preceding 24-48 hours is important, but occasionally longer periods will be relevant. 6. Middle categories imply that the patient supplies over 50 per cent of the effort. 7. Use of aids to be independent is allowed. Sandy Arita., Barthel, D.W. (6997). Functional evaluation: the Barthel Index. [address] [facility] (14)2. MARIANNE Jain, Yeny Snowcher.[facility], [address] [place] [address] (1999). Measuring the change indisability after inpatient rehabilitation; comparison of the responsiveness of the Barthel Index and Functional Finney Measure. Journal of Neurology, Neurosurgery, and Psychiatry, 66(4), 830-362. Casimiro Mckinney, N.J.A, JULIA Herrera.JIHAN, & Subha Kohli MLeticiaA. (2004.) Assessment of post-stroke quality of life in cost-effectiveness studies: The usefulness of the Barthel Index and the EuroQoL-5D. Quality of Life Research, 13, 139-10       G codes:   In compliance with CMSs Claims Based Outcome Reporting, the following G-code set was chosen for this patient based on their primary functional limitation being treated: The outcome measure chosen to determine the severity of the functional limitation was the barthel with a score of 25/100 which was correlated with the impairment scale. ? Mobility - Walking and Moving Around:     - CURRENT STATUS: CL - 60%-79% impaired, limited or restricted    - GOAL STATUS: CK - 40%-59% impaired, limited or restricted    - D/C STATUS:  ---------------To be determined---------------      Physical Therapy Evaluation Charge Determination   History Examination Presentation Decision-Making   MEDIUM  Complexity : 1-2 comorbidities / personal factors will impact the outcome/ POC  MEDIUM Complexity : 3 Standardized tests and measures addressing body structure, function, activity limitation and / or participation in recreation  MEDIUM Complexity : Evolving with changing characteristics  Other outcome measures barthel  HIGH       Based on the above components, the patient evaluation is determined to be of the following complexity level: MEDIUM    Pain:  Pain Scale 1: Numeric (0 - 10)  Pain Intensity 1: 10  Pain Location 1: Back  Pain Orientation 1: Mid  Pain Description 1: Aching  Pain Intervention(s) 1: Medication (see MAR)  Activity Tolerance:   poor  Please refer to the flowsheet for vital signs taken during this treatment. After treatment:   []         Patient left in no apparent distress sitting up in chair  [x]         Patient left in no apparent distress in bed  [x]         Call bell left within reach  [x]         Nursing notified  [x]         Caregiver present  []         Bed alarm activated    COMMUNICATION/EDUCATION:   The patients plan of care was discussed with: Occupational Therapist and Registered Nurse. [x]         Fall prevention education was provided and the patient/caregiver indicated understanding. [x]         Patient/family have participated as able in goal setting and plan of care.   [x] Patient/family agree to work toward stated goals and plan of care. []         Patient understands intent and goals of therapy, but is neutral about his/her participation. []         Patient is unable to participate in goal setting and plan of care.     Thank you for this referral.  Mariama Salvador, PT   Time Calculation: 25 mins

## 2018-07-10 NOTE — PROGRESS NOTES
Spiritual Care Assessment/Progress Note  [facility]      NAME: Narinder Albert      MRN: [id number]  AGE: 34 y.o. SEX: female  Quaker Affiliation: Holiness   Language: English     7/10/2018     Total Time (in minutes): 14     Spiritual Assessment begun in [facility]  MED SURG 2 through conversation with:         [x]Patient        [x] Family    [] Friend(s)        Reason for Consult: Palliative Care, Initial/Spiritual Assessment     Spiritual beliefs: (Please include comment if needed)     [x] Identifies with a adalgisa tradition: Holiness        [] Supported by a adalgisa community:            [] Claims no spiritual orientation:           [] Seeking spiritual identity:                [] Adheres to an individual form of spirituality:           [] Not able to assess:                           Identified resources for coping:      [x] Prayer                               [] Music                  [] Guided Imagery     [x] Family/friends                 [] Pet visits     [] Devotional reading                         [] Unknown     [] Other:                                               Interventions offered during this visit: (See comments for more details)    Patient Interventions: Affirmation of emotions/emotional suffering, Affirmation of adalgisa, Coping skills reviewed/reinforced, Iconic (affirming the presence of God/Higher Power), Initial/Spiritual assessment, patient floor, Prayer (assurance of), Prayer (actual)     Family/Friend(s):  Affirmation of adalgisa, Iconic (affirming the presence of God/Higher Power), Prayer (actual), Prayer (assurance of)     Plan of Care:     [] Support spiritual and/or cultural needs    [] Support AMD and/or advance care planning process      [] Support grieving process   [] Coordinate Rites and/or Rituals    [] Coordination with community clergy   [] No spiritual needs identified at this time   [] Detailed Plan of Care below (See Comments)  [] Make referral to Music Therapy  [] Make referral to Pet Therapy     [] Make referral to Addiction services  [] Make referral to [facility]  [] Make referral to Spiritual Care Partner  [] No future visits requested        [x] Follow up visits as needed     Comments: Initial Palliative Care spiritual assessment in 5 Med Surg. Miss English's father was in the room. Miss Juliane Mondragon shared [address] had just left. She was feeling much better after his visit. She shared she and her family are [facility].  She asked for prayers for healing and for finding out what is happening. Provided spiritual presence and prayer. Advised of  Availability.   Visited by: Andrew Amos [address] [facility] [place] ([id number])

## 2018-07-10 NOTE — PROGRESS NOTES
CSF labs reviewed. Normal WBCs (4), protein (38), glucose (60), gram stain (negative to date)  MS panel and HSV PCR pending    Transverse myelitis is less likely given normal MRIs and normal CSF so far. Will add ISABELLA, CRP, Neuromyelitis Optica abs, , Sjogren (SSA/ SSB) to AM labs to look for other causes of pt's symptoms.

## 2018-07-10 NOTE — PROGRESS NOTES
Problem: Falls - Risk of  Goal: *Absence of Falls  Document Awa Fall Risk and appropriate interventions in the flowsheet. Outcome: Progressing Towards Goal  Fall Risk Interventions:  Mobility Interventions: Communicate number of staff needed for ambulation/transfer, Patient to call before getting OOB         Medication Interventions: Bed/chair exit alarm, Evaluate medications/consider consulting pharmacy, Patient to call before getting OOB, Teach patient to arise slowly                  Problem: Pressure Injury - Risk of  Goal: *Prevention of pressure injury  Document Say Scale and appropriate interventions in the flowsheet.    Pressure Injury Interventions:  Sensory Interventions: Assess changes in LOC, Assess need for specialty bed, Check visual cues for pain    Moisture Interventions: Absorbent underpads, Check for incontinence Q2 hours and as needed, Internal/External urinary devices, Moisture barrier    Activity Interventions: Pressure redistribution bed/mattress(bed type)    Mobility Interventions: HOB 30 degrees or less, Pressure redistribution bed/mattress (bed type), PT/OT evaluation    Nutrition Interventions: Document food/fluid/supplement intake    Friction and Shear Interventions: Apply protective barrier, creams and emollients, Minimize layers

## 2018-07-10 NOTE — PROGRESS NOTES
Gabino Higuera [facility] 
[address] [facility], [address] [place] [address] 
[phone] Medical Progress Note NAME: Damaris Menjivar :  1990 MRM:  [id number] Date/Time: 7/10/2018  11:09 AM  
 
 
  
Subjective: Chief Complaint:  Pain: low back, severe, constant, no relief with interventions so far. Patient was sitting up smiling when I walked in the room. She immediately laid back down and began crying, stating her pain is 10/10. She states it hasn't dropped below am 8 since admission. Yesterday several pain levels of 5 were charted after starting Ultram/Zanaflex. She states this is not accurate and she has had no relief whatsoever. She is also reporting severe headache and nausea after the LP 
 
 
ROS: 
(bold if positive, if negative) Nausea Tolerating Diet Objective:  
 
 
Vitals:  
 
 
  
Last 24hrs VS reviewed since prior progress note. Most recent are: 
 
Visit Vitals  /74 (BP 1 Location: Right arm, BP Patient Position: At rest)  Pulse 77  Temp 98 °F (36.7 °C)  Resp 17  Ht 5' 6\" (1.676 m)  Wt 99.3 kg (219 lb)  SpO2 98%  Breastfeeding No  
 BMI 35.35 kg/m2 SpO2 Readings from Last 6 Encounters:  
07/10/18 98% 18 99% 18 96% 10/30/17 97% 10/21/16 98% 10/21/16 96% Intake/Output Summary (Last 24 hours) at 07/10/18 1106 Last data filed at 07/10/18 1106 Gross per 24 hour Intake              630 ml Output             1300 ml Net             -670 ml Exam:  
 
Physical Exam: 
 
Gen:  Well-developed, obese HEENT:  Pink conjunctivae, PERRL, hearing intact to voice, moist mucous membranes Neck:  Supple, without masses, thyroid non-tender Resp:  No accessory muscle use, clear breath sounds without wheezes rales or rhonchi 
Card:  No murmurs, normal S1, S2 without thrills, bruits or peripheral edema Abd:  Soft, non-tender, non-distended, normoactive bowel sounds are present, no palpable organomegaly and no detectable hernias Lymph:  No cervical or inguinal adenopathy Musc:  No cyanosis or clubbing Skin:  No rashes or ulcers, skin turgor is good Neuro:  Cranial nerves are grossly intact, no focal motor weakness, follows commands appropriately Psych:  Good insight, oriented to person, place and time, alert, initially smiling then immediately tearful, very emotional 
 
  
Medications Reviewed: (see below) Lab Data Reviewed: (see below) 
 
______________________________________________________________________ Medications:  
 
Current Facility-Administered Medications Medication Dose Route Frequency  methylPREDNISolone ((Solu-MEDROL) 1,000 mg in 0.9% sodium chloride (MBP/ADV) 100 mL  1 g IntraVENous DAILY  gabapentin (NEURONTIN) capsule 300 mg  300 mg Oral Q8H  
 FLUoxetine (PROzac) capsule 20 mg  20 mg Oral QHS  sodium chloride (NS) flush 5-10 mL  5-10 mL IntraVENous Q8H  
 sodium chloride (NS) flush 5-10 mL  5-10 mL IntraVENous PRN  
 acetaminophen (TYLENOL) tablet 650 mg  650 mg Oral Q4H PRN  
 enoxaparin (LOVENOX) injection 40 mg  40 mg SubCUTAneous Q24H  polyethylene glycol (MIRALAX) packet 17 g  17 g Oral BID Lab Review:  
 
Recent Labs  
   07/10/18 
 0419  07/08/18 
 1636 WBC  8.4  10.9 HGB  13.0  13.8 HCT  39.6  41.4 PLT  205  221 Recent Labs  
   07/10/18 
 0419  07/08/18 
 1636 NA  143  141  
K  4.2  3.9 CL  108  108 CO2  26  23 GLU  83  112* BUN  16  15 CREA  0.93  0.80 CA  8.6  8.7 ALB   --   3.5 TBILI   --   0.5 SGOT   --   14* ALT   --   21 Lab Results Component Value Date/Time Glucose (POC) 96 06/20/2012 01:55 AM  
 Glucose (POC) 93 05/17/2012 04:56 PM  
 
No results for input(s): PH, PCO2, PO2, HCO3, FIO2 in the last 72 hours. No results for input(s): INR in the last 72 hours. No lab exists for component: INREXT, INREXT Lab Results Component Value Date/Time  Specimen Description: URINE 07/11/2012 12:00 PM  
 Specimen Description: URINE 06/20/2012 01:58 AM  
 Specimen Description: CEREBROSPINAL FLUID 04/04/2012 03:20 PM  
 
Lab Results Component Value Date/Time Culture result: Culture performed on Unspun Fluid 07/09/2018 04:36 PM  
 Culture result: NO GROWTH AFTER 14 HOURS 07/09/2018 04:36 PM  
 Culture result: NO GROWTH 1 DAY 07/08/2018 03:00 PM  
 
 
 
  
Assessment:  
 
Principal Problem: 
  Left leg weakness (7/8/2018) Active Problems: 
  Numbness in left leg (7/8/2018) Seizures ([address] () Nonintractable migraine (7/8/2018) Urinary incontinence (7/9/2018) Bradycardia, sinus (7/9/2018) Obesity (BMI 30-39.9) (7/9/2018) Plan:  
 
Principal Problem: 
  Left leg weakness (7/8/2018)/Numbness in left leg (7/8/2018)/Back pain 
 - no clear etiology 
 - started on IV steroids by Dr. Moise Jones, unclear to me why, LP was negative and CRP is not at all elevated - pain remains uncontrolled 
 - will stop Ultram and Zanaflex as she states they aren't doing anything at all - I have ordered Lidoderm, I can't think what else to do and I do not feel comfortable ordering opiates when her workup has all been normal 
 - consult Palliative Care - I do not think she or her family would be accepting of talking with Psychiatry at this time Active Problems: 
  Seizures (HCC) () 
 - no recent seizures 
 - not on chronic AED therapy 
 - outpatient follow up with Neurology Nonintractable migraine (7/8/2018) 
 - watch post-LP Urinary incontinence (7/9/2018) - no urinary retention that I can find documented by bladder scan - Purewick, no Mccrary indicated Bradycardia, sinus (7/9/2018) - TSH normal, EKG NSR, NSST, visualized by me  
 - asymptomatic, monitor on telemetry Total time spent in patient care: 25 minutes Care Plan discussed with: Patient, Care Manager and Nursing Staff Discussed:  Code Status, Care Plan and D/C Planning Prophylaxis:  Lovenox Disposition:  Home w/Family 
        
___________________________________________________ Attending Physician: Yadira Rodrigues MD

## 2018-07-10 NOTE — PROGRESS NOTES
Orders per MD to d/c quinonez. Quinonez cath d/c'd with balloon intact, pt tolerated well. Purewik cath in place for incontinence and comfort. Pt resting at present, bed rest til tomorrow due to Lumbar Puncture.

## 2018-07-10 NOTE — ROUTINE PROCESS
Bedside and Verbal shift change report given to Aris Barrett rn (oncoming nurse) by Derrick Villareal (offgoing nurse). Report included the following information SBAR, Kardex, Procedure Summary, Intake/Output, MAR, Recent Results and Cardiac Rhythm SB/NSR.

## 2018-07-10 NOTE — CONSULTS
Palliative Medicine Consult    Patient Name: Jameel Garcia  YOB: 1990    Date of Initial Consult: 7/10/18  Reason for Consult: Overwhelming symptoms  Requesting Provider: Dr. Gopal Reza  Primary Care Physician: Hattie Aldridge NP      SUMMARY:   Jameel Garcia is a 34 y.o. with a past history of seizure d/o(not on meds), who was admitted on 7/8/2018 from home with a diagnosis of numbness in LE and back pain. Current medical issues leading to Palliative Medicine involvement include: overwhelming symptoms    Chart reviewed/HPI-patient with prior h/o of lower extremity weakness/numbness abput 7 years ago. She was admitted to [facility] but cause never known and she was discharged after 1 week in the hospital. During that time, she was receiving high doses of IV dilaudid to the point where her mom apparently weaned her off once she returned home. Patient was returning from the beach last Thursday and as she was getting into her car, her left leg \"gave out\" and had associated back pain. Thought maybe related to sleeping on different bed at the beach but over the weekend, symptoms progressed to include left leg numbness to right leg numbness and then numbness from the waist distally. In addition, she describes severe back pain. Workup to include MRI brain, cervical, thoracic and lumbar spine, LP, neurology evaluation have not been conclusive. There is concern about transverse myelitis and patient been started on high dose IV solumedrol. In addition, she has been placed on gabapentin 600 mg(dose increased earlier today by Neurology). Since being in the hospital, she has used ultram, zanaflex, percocet and toradol and states none have provided her any relief of her back pain. Patient with other issues to include urinary incontinence and constipation issues.       reviewed  Only 2 scripts in the last year->one for valium on 4/24/18 and 1 for percocet 5/325 on 9/7/17    SH-lives with her boyfriend, Denies any illicit drug use. No significant alcohol intake. Works at [employer] with [employer]. Mom is a nurse with [employer]:   1. Acute back pain, unclear etiology-both neuropathic and somatic pain  2. Numbness in lower extremities, unclear cause-workup in progress  3. Debility         PLAN:   1. Met with patient and her dad. Reviewed our role in her care. Discussed current medical issues. She has a good understanding of medical problems but just wishes she knew the cause  2. Pain management-complicated situation since we are not completely sure of the cause of the pain and test results so far are unrevealing. There appears to be both somatic and neuropathic type pain. At this point, she has been placed on gabapentin 600 mg tid, lioderm patch and also solumedrol->both good for neuropathic type pain. No other meds have been helpful at this time(see list above that has been tried). We then had a long discussion about opioids and explained that our country is in a much different place then we were 7 years ago when this occurred and prescribing opioids without a definite reason is very challenging. We are being careful with opioid prescribing not because we want to protect our licenses but we alos want to protect her from the effects of opioids if she does not need exposure. We never know which patient may become \"addicted\" when they are started. I am willing to prescribe a low dose oral opioid for short amount of time to see if she can receive some relief. Explained at length this will not be a long term solution, especially if testing continues to be negative. We will not prescribe IV opioids at this time. We reviewed the different opioids and the relative potency of each. After discussing with Dr. Vania Munroe, will prescribe dilaudid 1 mg every 4 hours prn pain. Will review daily the need for this to be continued  3.  Discussed with bedside nurse(Kristie). 4. Patient will need to continue bowel regimen  5. Our team will f/u tomorrow  6. Initial consult note routed to primary continuity provider  7. Communicated plan of care with: Palliative IDT       GOALS OF CARE / TREATMENT PREFERENCES:   [====Goals of Care====]  GOALS OF CARE:  Patient/Health Care Proxy Stated Goals: Rehabilitation      TREATMENT PREFERENCES:   Code Status: Full Code    Advance Care Planning:  Advance Care Planning 7/8/2018   Patient's Healthcare Decision Maker is: Legal Next of Kin   Confirm Advance Directive None       Medical Interventions: Full interventions  Other Instructions: The palliative care team has discussed with patient / health care proxy about goals of care / treatment preferences for patient.  [====Goals of Care====]         HISTORY:     History obtained from: chart, patient, father    CHIEF COMPLAINT: back pain    HPI/SUBJECTIVE:    The patient is:   [x] Verbal and participatory  [] Non-participatory due to:   Patient lying in bed on her side. Pain remains in her back. Clinical Pain Assessment (nonverbal scale for severity on nonverbal patients):   Clinical Pain Assessment  Severity: 10  Location: lower back  Character: throbbing  Duration: days  Effect: difficult to ambulate  Factors: movement  Frequency: constant            FUNCTIONAL ASSESSMENT:     Palliative Performance Scale (PPS):  PPS: 50       PSYCHOSOCIAL/SPIRITUAL SCREENING:     Advance Care Planning:  Advance Care Planning 7/8/2018   Patient's Healthcare Decision Maker is: Legal Next of Kin   Confirm Advance Directive None        Any spiritual / Mormonism concerns:  [] Yes /  [x] No    Caregiver Burnout:  [] Yes /  [x] No /  [] No Caregiver Present      Anticipatory grief assessment:   [x] Normal  / [] Maladaptive       ESAS Anxiety: Anxiety: 1    ESAS Depression: Depression: 0        REVIEW OF SYSTEMS:     Positive and pertinent negative findings in ROS are noted above in HPI.   The following systems were [x] reviewed / [] unable to be reviewed as noted in HPI  Other findings are noted below. Systems: constitutional, ears/nose/mouth/throat, respiratory, gastrointestinal, genitourinary, musculoskeletal, integumentary, neurologic, psychiatric, endocrine. Positive findings noted below. Modified ESAS Completed by: provider   Fatigue: 1 Drowsiness: 0   Depression: 0 Pain: 10   Anxiety: 1 Nausea: 0   Anorexia: 0 Dyspnea: 0     Constipation: Yes              PHYSICAL EXAM:     From RN flowsheet:  Wt Readings from Last 3 Encounters:   07/08/18 219 lb (99.3 kg)   04/27/18 251 lb (113.9 kg)   04/27/18 246 lb 14.6 oz (112 kg)     Blood pressure 150/89, pulse 86, temperature 99 °F (37.2 °C), resp. rate 17, height 5' 6\" (1.676 m), weight 219 lb (99.3 kg), SpO2 98 %, not currently breastfeeding.     Pain Scale 1: Numeric (0 - 10)  Pain Intensity 1: 9  Pain Onset 1: acute  Pain Location 1: Back  Pain Orientation 1: Lower  Pain Description 1: Aching  Pain Intervention(s) 1: Repositioned  Last bowel movement, if known:     Constitutional: alert and oriented, appears comfortable, lying on her side  Eyes: pupils equal, anicteric  ENMT: no nasal discharge, moist mucous membranes  Cardiovascular: regular rhythm, distal pulses intact  Respiratory: breathing not labored, symmetric  Gastrointestinal: soft non-tender, +bowel sounds  Musculoskeletal: no deformity, TTP to palpation to lower back area, lidoderm patches in place  Skin: warm, dry  Neurologic: following commands, moving all extremities, decreased sensation to LT in left leg  Psychiatric: flat affect, no hallucinations  Other:       HISTORY:     Principal Problem:    Left leg weakness (7/8/2018)    Active Problems:    Numbness in left leg (7/8/2018)      Seizures (HCC) ()      Nonintractable migraine (7/8/2018)      Urinary incontinence (7/9/2018)      Bradycardia, sinus (7/9/2018)      Obesity (BMI 30-39.9) (7/9/2018)      Past Medical History:   Diagnosis Date    Headache     Headache(784.0)     Obesity (BMI 30-39. 9) 7/9/2018    Seizures (HCC)       Past Surgical History:   Procedure Laterality Date    ENDOSCOPY, COLON, DIAGNOSTIC      HX APPENDECTOMY      HX CHOLECYSTECTOMY      HX GI      HX GYN        Family History   Problem Relation Age of Onset    Hypertension Father     Diabetes Maternal Grandmother     Coronary Artery Disease Maternal Grandfather     Diabetes Maternal Grandfather     Diabetes Paternal Grandfather       History reviewed, no pertinent family history. Social History   Substance Use Topics    Smoking status: Current Every Day Smoker    Smokeless tobacco: Never Used    Alcohol use Yes     Allergies   Allergen Reactions    Codeine Other (comments)     Tachycardia    Sulfa (Sulfonamide Antibiotics) Itching    Bactrim [Sulfamethoprim Ds] Itching    Chocolate Flavor Other (comments)     Pt reports migraines    Clindamycin Other (comments)     Chest tightness    Compazine [Prochlorperazine Edisylate] Other (comments)     Neck and jaw spasm  And difficultly swallow     Morphine Hives    Peanut Other (comments)     migraines    Reglan [Metoclopramide] Nausea and Vomiting and Vertigo     Sleep disorder and shaking    Sulfur Swelling     Throat closes.     Zofran [Ondansetron Hcl (Pf)] Hives    Metaxalone Palpitations      Current Facility-Administered Medications   Medication Dose Route Frequency    methylPREDNISolone ((Solu-MEDROL) 1,000 mg in 0.9% sodium chloride (MBP/ADV) 100 mL  1 g IntraVENous DAILY    lidocaine (LIDODERM) 5 % patch 3 Patch  3 Patch TransDERmal Q24H    gabapentin (NEURONTIN) capsule 600 mg  600 mg Oral TID    HYDROmorphone (DILAUDID) tablet 1 mg  1 mg Oral Q4H PRN    FLUoxetine (PROzac) capsule 20 mg  20 mg Oral QHS    sodium chloride (NS) flush 5-10 mL  5-10 mL IntraVENous Q8H    sodium chloride (NS) flush 5-10 mL  5-10 mL IntraVENous PRN    acetaminophen (TYLENOL) tablet 650 mg  650 mg Oral Q4H PRN    enoxaparin (LOVENOX) injection 40 mg  40 mg SubCUTAneous Q24H    polyethylene glycol (MIRALAX) packet 17 g  17 g Oral BID          LAB AND IMAGING FINDINGS:     Lab Results   Component Value Date/Time    WBC 8.4 07/10/2018 04:19 AM    HGB 13.0 07/10/2018 04:19 AM    PLATELET 621 27/00/5191 04:19 AM     Lab Results   Component Value Date/Time    Sodium 143 07/10/2018 04:19 AM    Potassium 4.2 07/10/2018 04:19 AM    Chloride 108 07/10/2018 04:19 AM    CO2 26 07/10/2018 04:19 AM    BUN 16 07/10/2018 04:19 AM    Creatinine 0.93 07/10/2018 04:19 AM    Calcium 8.6 07/10/2018 04:19 AM      Lab Results   Component Value Date/Time    AST (SGOT) 14 (L) 07/08/2018 04:36 PM    Alk. phosphatase 72 07/08/2018 04:36 PM    Protein, total 6.5 07/08/2018 04:36 PM    Albumin 3.5 07/08/2018 04:36 PM    Globulin 3.0 07/08/2018 04:36 PM     No results found for: INR, PTMR, PTP, PT1, PT2, APTT   No results found for: IRON, FE, TIBC, IBCT, PSAT, FERR   No results found for: PH, PCO2, PO2  No components found for: GLPOC   No results found for: CPK, CKMB             Total time:   Counseling / coordination time, spent as noted above:   > 50% counseling / coordination?:     Prolonged service was provided for  []30 min   []75 min in face to face time in the presence of the patient, spent as noted above. Time Start:   Time End:   Note: this can only be billed with 10959 (initial) or 99913 (follow up). If multiple start / stop times, list each separately.

## 2018-07-10 NOTE — PROGRESS NOTES
Problem: Self Care Deficits Care Plan (Adult)  Goal: *Acute Goals and Plan of Care (Insert Text)  Occupational Therapy Goals  Initiated 7/10/2018  1. Patient will perform ADLs sitting EOB 5 mins without reports of pain supervision/setup within 7 day(s). 2.  Patient will perform lower body ADLs with minimal assistance/contact guard assist within 7 day(s). 3.  Patient will perform bathing with minimal assistance/contact guard assist within 7 day(s). 4.  Patient will perform toilet transfers with minimal assistance/contact guard assist x2 and least restrictive device within 7 day(s). 5.  Patient will perform all aspects of toileting with supervision/set-up within 7 day(s). 6.  Patient will participate in upper extremity therapeutic exercise/activities to increase independence with ADLs with independence for 5 minutes within 7 day(s). 7.  Patient will verbalize/demonstrate 3/3 back precautions during ADL tasks without cues within 7 days. Occupational Therapy EVALUATION  Patient: Shavonne Brunner (32 y.o. female)  Date: 7/10/2018  Primary Diagnosis: Left leg weakness  Left leg weakness        Precautions:  Back, Fall (L knee buckling)    ASSESSMENT :  Based on the objective data described below, the patient presents with overall Min-Mod A x2 with RW for functional mobility, up to Total A for lower body ADLs, and independent-Min A for upper body ADLs s/p acute LLE weakness, severe low back pain, incontinence, and GLF. Patient continuing with neurological workup, all results and imaging so far negative. Patient quite limited with PT this AM d/t significant pain in back. Spent time discussing back precautions in order to reduce pain during functional transfers and ADLs. Visual handout provided. Patient demonstrating intact BUE ROM, coordination, and sensation. No changes in vision. D/t above mentioned deficits, patient is impaired in >75% of all ADLs, typically independent, active, and working full time.  Patient advocacy then present and patient unable to tolerate further participation d/t pain, spoke with RN. Patient will benefit from d/c to inpatient rehab when medically stable. Patient will benefit from skilled intervention to address the above impairments. Patients rehabilitation potential is considered to be Good  Factors which may influence rehabilitation potential include:   []             None noted  []             Mental ability/status  []             Medical condition  []             Home/family situation and support systems  []             Safety awareness  []             Pain tolerance/management  []             Other:      PLAN :  Recommendations and Planned Interventions:  [x]               Self Care Training                  [x]        Therapeutic Activities  [x]               Functional Mobility Training    []        Cognitive Retraining  [x]               Therapeutic Exercises           [x]        Endurance Activities  [x]               Balance Training                   [x]        Neuromuscular Re-Education  []               Visual/Perceptual Training     [x]   Home Safety Training  [x]               Patient Education                 [x]        Family Training/Education  []               Other (comment):    Frequency/Duration: Patient will be followed by occupational therapy 5 times a week to address goals. Discharge Recommendations: Inpatient Rehab  Further Equipment Recommendations for Discharge: TBD     SUBJECTIVE:   Patient stated I'm just in constant pain.     OBJECTIVE DATA SUMMARY:   HISTORY:   Past Medical History:   Diagnosis Date    Headache     Headache(784.0)     Obesity (BMI 30-39. 9) 7/9/2018    Seizures (HCC)      Past Surgical History:   Procedure Laterality Date    ENDOSCOPY, COLON, DIAGNOSTIC      HX APPENDECTOMY      HX CHOLECYSTECTOMY      HX GI      HX GYN         Prior Level of Function/Environment/Context: Per patient report, lives at home with boyfriend and his mother. Patient is independent. Working full time. Home Situation  Home Environment: Private residence  # Steps to Enter: 3  Rails to Enter: Yes  Hand Rails : Bilateral  One/Two Story Residence: One story  Living Alone: No  Support Systems: Spouse/Significant Other/Partner  Patient Expects to be Discharged to[de-identified] Unknown  Current DME Used/Available at Home: Walker, rolling, Cane, straight, Crutches  Tub or Shower Type: Tub/Shower combination    Hand dominance: Right    EXAMINATION OF PERFORMANCE DEFICITS:  Cognitive/Behavioral Status:  Neurologic State: Alert  Orientation Level: Oriented X4  Cognition: Appropriate for age attention/concentration; Appropriate decision making; Appropriate safety awareness; Follows commands  Perception: Appears intact  Perseveration: No perseveration noted  Safety/Judgement: Awareness of environment; Fall prevention; Insight into deficits    Skin: Appears intact    Edema: None noted in BUEs    Hearing: Auditory  Auditory Impairment: None    Vision/Perceptual:    Tracking: Able to track stimulus in all quadrants w/o difficulty    Acuity: Within Defined Limits         Range of Motion:  In BUEs  AROM: Within functional limits  PROM: Within functional limits    Strength: In BUEs  Strength: Within functional limits    Coordination:  Coordination: Within functional limits  Fine Motor Skills-Upper: Left Intact; Right Intact    Gross Motor Skills-Upper: Left Intact; Right Intact    Tone & Sensation:  In BUEs  Tone: Normal  Sensation: Intact    Balance:  Sitting: Intact; High guard  Standing: Impaired; With support  Standing - Static: Constant support; Fair  Standing - Dynamic : Poor    Functional Mobility and Transfers for ADLs:  Bed Mobility:  Rolling:  (log)  Supine to Sit: Assist x2; Moderate assistance;Minimum assistance  Sit to Supine: Moderate assistance;Assist x2;Minimum assistance  Scooting: Supervision    Transfers:  Sit to Stand:  Moderate assistance;Assist x2  Stand to Sit: Minimum assistance;Assist x2  Toilet Transfer : Moderate assistance;Assist x2;Adaptive equipment; Additional time (Inferred per reports of mobility with PT)    ADL Assessment:  Feeding: Independent    Oral Facial Hygiene/Grooming: Modified Independent (Completing at bed level currently d/t back pain)    Bathing: Maximum assistance (Able to tolerate upper body supine level only currently)    Upper Body Dressing: Minimum assistance (Inferred per obs of BUE ROM, limited by pain)    Lower Body Dressing: Total assistance (Inferred per back pain, LLE weakness, imp act milo)    Toileting: Moderate assistance (Incontinent, dec reach to bottom d/t back pain)    ADL Intervention and task modifications:  Patient instructed and indicated understanding the benefits of maintaining activity tolerance, functional mobility, and independence with self care tasks during acute stay  to ensure safe return home and to baseline. Encouraged patient to increase frequency and duration OOB, not sitting longer than 30 mins without marching/walking with staff. Patient instruction and indicated understanding on body mechanics, ergonomics and gravitational force on the spine during different body positions to plan activities in prep for return home to complete basic ADLs, instrumental ADLs and back to work safely. Cognitive Retraining  Safety/Judgement: Awareness of environment; Fall prevention; Insight into deficits    Functional Measure:  Barthel Index:    Bathin  Bladder: 0  Bowels: 0  Groomin  Dressin  Feeding: 10  Mobility: 0  Stairs: 0  Toilet Use: 5  Transfer (Bed to Chair and Back): 5  Total: 25       Barthel and G-code impairment scale:  Percentage of impairment CH  0% CI  1-19% CJ  20-39% CK  40-59% CL  60-79% CM  80-99% CN  100%   Barthel Score 0-100 100 99-80 79-60 59-40 20-39 1-19   0   Barthel Score 0-20 20 17-19 13-16 9-12 5-8 1-4 0      The Barthel ADL Index: Guidelines  1.  The index should be used as a record of what a patient does, not as a record of what a patient could do. 2. The main aim is to establish degree of independence from any help, physical or verbal, however minor and for whatever reason. 3. The need for supervision renders the patient not independent. 4. A patient's performance should be established using the best available evidence. Asking the patient, friends/relatives and nurses are the usual sources, but direct observation and common sense are also important. However direct testing is not needed. 5. Usually the patient's performance over the preceding 24-48 hours is important, but occasionally longer periods will be relevant. 6. Middle categories imply that the patient supplies over 50 per cent of the effort. 7. Use of aids to be independent is allowed. Rosanne Mariscal., Barthel, D.W. (0518). Functional evaluation: the Barthel Index. [facility] (14)2. MARIANNE Lynn, Fiona Tamayo., [facility]., [place], [address] (1999). Measuring the change indisability after inpatient rehabilitation; comparison of the responsiveness of the Barthel Index and Functional [place] Measure. Journal of Neurology, Neurosurgery, and Psychiatry, 66(4), 928-445. Taylor Karimi, N.J.A, Justus Ahuja,  JULIA.JIHAN, & Gerald Fry, M.A. (2004.) Assessment of post-stroke quality of life in cost-effectiveness studies: The usefulness of the Barthel Index and the EuroQoL-5D. Quality of Life Research, 13, 361-88         G codes: In compliance with CMSs Claims Based Outcome Reporting, the following G-code set was chosen for this patient based on their primary functional limitation being treated: The outcome measure chosen to determine the severity of the functional limitation was the Barthel Index with a score of 25/100 which was correlated with the impairment scale. ?  Self Care:     - CURRENT STATUS: CL - 60%-79% impaired, limited or restricted    - GOAL STATUS: CK - 40%-59% impaired, limited or restricted    - D/C STATUS:  ---------------To be determined---------------     Occupational Therapy Evaluation Charge Determination   History Examination Decision-Making   LOW Complexity : Brief history review  MEDIUM Complexity : 3-5 performance deficits relating to physical, cognitive , or psychosocial skils that result in activity limitations and / or participation restrictions LOW Complexity : No comorbidities that affect functional and no verbal or physical assistance needed to complete eval tasks       Based on the above components, the patient evaluation is determined to be of the following complexity level: LOW   Pain:  Pain Scale 1: Numeric (0 - 10)  Pain Intensity 1: 10  Pain Location 1: Back  Pain Orientation 1: Mid  Pain Description 1: Aching  Pain Intervention(s) 1: Medication (see MAR)  Activity Tolerance:   Fair. Limited by severe pain. Please refer to the flowsheet for vital signs taken during this treatment. After treatment:   [] Patient left in no apparent distress sitting up in chair  [x] Patient left in no apparent distress in bed  [x] Call bell left within reach  [x] Nursing notified  [x] Caregiver present  [] Bed alarm activated    COMMUNICATION/EDUCATION:   The patients plan of care was discussed with: Physical Therapist and Registered Nurse. [x] Home safety education was provided and the patient/caregiver indicated understanding. [x] Patient/family have participated as able in goal setting and plan of care. [] Patient/family agree to work toward stated goals and plan of care. [] Patient understands intent and goals of therapy, but is neutral about his/her participation. [] Patient is unable to participate in goal setting and plan of care. This patients plan of care is appropriate for delegation to hospitals.     Thank you for this referral.  Karthik Nielson OT  Time Calculation: 11 mins

## 2018-07-11 ENCOUNTER — ANESTHESIA EVENT (OUTPATIENT)
Dept: SURGERY | Age: 28
DRG: 059 | End: 2018-07-11
Payer: COMMERCIAL

## 2018-07-11 ENCOUNTER — ANESTHESIA (OUTPATIENT)
Dept: SURGERY | Age: 28
DRG: 059 | End: 2018-07-11
Payer: COMMERCIAL

## 2018-07-11 LAB
ANA TITR SER IF: NEGATIVE {TITER}
B BURGDOR IGG+IGM SER-ACNC: <0.91 ISR (ref 0–0.9)
LACTATE CSF-SCNC: 17 MG/DL (ref 10–22)

## 2018-07-11 PROCEDURE — 3E0R3GC INTRODUCTION OF OTHER THERAPEUTIC SUBSTANCE INTO SPINAL CANAL, PERCUTANEOUS APPROACH: ICD-10-PCS | Performed by: ANESTHESIOLOGY

## 2018-07-11 PROCEDURE — 99218 HC RM OBSERVATION: CPT

## 2018-07-11 PROCEDURE — 74011250637 HC RX REV CODE- 250/637: Performed by: INTERNAL MEDICINE

## 2018-07-11 PROCEDURE — 74011250636 HC RX REV CODE- 250/636: Performed by: PSYCHIATRY & NEUROLOGY

## 2018-07-11 PROCEDURE — 76060000032 HC ANESTHESIA 0.5 TO 1 HR

## 2018-07-11 PROCEDURE — 77030038269 HC DRN EXT URIN PURWCK BARD -A

## 2018-07-11 PROCEDURE — 74011250636 HC RX REV CODE- 250/636

## 2018-07-11 PROCEDURE — 76010000093 HC SPECIAL PROCEDURE

## 2018-07-11 PROCEDURE — 74011250636 HC RX REV CODE- 250/636: Performed by: INTERNAL MEDICINE

## 2018-07-11 PROCEDURE — 77030014125 HC TY EPDRL BBMI -B: Performed by: ANESTHESIOLOGY

## 2018-07-11 PROCEDURE — 74011000258 HC RX REV CODE- 258: Performed by: PSYCHIATRY & NEUROLOGY

## 2018-07-11 PROCEDURE — 76450000000

## 2018-07-11 PROCEDURE — 009U3ZX DRAINAGE OF SPINAL CANAL, PERCUTANEOUS APPROACH, DIAGNOSTIC: ICD-10-PCS | Performed by: RADIOLOGY

## 2018-07-11 PROCEDURE — 74011250637 HC RX REV CODE- 250/637: Performed by: PSYCHIATRY & NEUROLOGY

## 2018-07-11 PROCEDURE — 74011250637 HC RX REV CODE- 250/637: Performed by: FAMILY MEDICINE

## 2018-07-11 RX ORDER — FENTANYL CITRATE 50 UG/ML
INJECTION, SOLUTION INTRAMUSCULAR; INTRAVENOUS AS NEEDED
Status: DISCONTINUED | OUTPATIENT
Start: 2018-07-11 | End: 2018-07-11 | Stop reason: HOSPADM

## 2018-07-11 RX ORDER — MIDAZOLAM HYDROCHLORIDE 1 MG/ML
INJECTION, SOLUTION INTRAMUSCULAR; INTRAVENOUS AS NEEDED
Status: DISCONTINUED | OUTPATIENT
Start: 2018-07-11 | End: 2018-07-11 | Stop reason: HOSPADM

## 2018-07-11 RX ORDER — HYDROMORPHONE HYDROCHLORIDE 2 MG/1
2 TABLET ORAL
Status: DISCONTINUED | OUTPATIENT
Start: 2018-07-11 | End: 2018-07-16

## 2018-07-11 RX ORDER — POLYETHYLENE GLYCOL 3350 17 G/17G
17 POWDER, FOR SOLUTION ORAL
Status: COMPLETED | OUTPATIENT
Start: 2018-07-11 | End: 2018-07-11

## 2018-07-11 RX ORDER — ZOLPIDEM TARTRATE 5 MG/1
5 TABLET ORAL
Status: DISCONTINUED | OUTPATIENT
Start: 2018-07-11 | End: 2018-07-25 | Stop reason: HOSPADM

## 2018-07-11 RX ADMIN — FLUOXETINE 20 MG: 20 CAPSULE ORAL at 21:10

## 2018-07-11 RX ADMIN — MIDAZOLAM HYDROCHLORIDE 3 MG: 1 INJECTION, SOLUTION INTRAMUSCULAR; INTRAVENOUS at 16:48

## 2018-07-11 RX ADMIN — GABAPENTIN 600 MG: 300 CAPSULE ORAL at 21:10

## 2018-07-11 RX ADMIN — POLYETHYLENE GLYCOL (3350) 17 G: 17 POWDER, FOR SOLUTION ORAL at 17:58

## 2018-07-11 RX ADMIN — SODIUM CHLORIDE 1000 MG: 900 INJECTION, SOLUTION INTRAVENOUS at 10:29

## 2018-07-11 RX ADMIN — Medication 10 ML: at 21:12

## 2018-07-11 RX ADMIN — FENTANYL CITRATE 50 MCG: 50 INJECTION, SOLUTION INTRAMUSCULAR; INTRAVENOUS at 16:50

## 2018-07-11 RX ADMIN — POLYETHYLENE GLYCOL (3350) 17 G: 17 POWDER, FOR SOLUTION ORAL at 14:03

## 2018-07-11 RX ADMIN — HYDROMORPHONE HYDROCHLORIDE 1 MG: 2 TABLET ORAL at 15:19

## 2018-07-11 RX ADMIN — FENTANYL CITRATE 50 MCG: 50 INJECTION, SOLUTION INTRAMUSCULAR; INTRAVENOUS at 16:48

## 2018-07-11 RX ADMIN — POLYETHYLENE GLYCOL (3350) 17 G: 17 POWDER, FOR SOLUTION ORAL at 10:06

## 2018-07-11 RX ADMIN — ZOLPIDEM TARTRATE 5 MG: 5 TABLET ORAL at 22:15

## 2018-07-11 RX ADMIN — GABAPENTIN 600 MG: 300 CAPSULE ORAL at 15:19

## 2018-07-11 RX ADMIN — HYDROMORPHONE HYDROCHLORIDE 1 MG: 2 TABLET ORAL at 01:22

## 2018-07-11 RX ADMIN — MIDAZOLAM HYDROCHLORIDE 2 MG: 1 INJECTION, SOLUTION INTRAMUSCULAR; INTRAVENOUS at 16:50

## 2018-07-11 RX ADMIN — HYDROMORPHONE HYDROCHLORIDE 1 MG: 2 TABLET ORAL at 10:24

## 2018-07-11 RX ADMIN — HYDROMORPHONE HYDROCHLORIDE 2 MG: 2 TABLET ORAL at 22:15

## 2018-07-11 RX ADMIN — HYDROMORPHONE HYDROCHLORIDE 1 MG: 2 TABLET ORAL at 05:28

## 2018-07-11 RX ADMIN — POLYETHYLENE GLYCOL (3350) 17 G: 17 POWDER, FOR SOLUTION ORAL at 15:07

## 2018-07-11 RX ADMIN — ENOXAPARIN SODIUM 40 MG: 40 INJECTION SUBCUTANEOUS at 21:11

## 2018-07-11 RX ADMIN — POLYETHYLENE GLYCOL (3350) 17 G: 17 POWDER, FOR SOLUTION ORAL at 12:23

## 2018-07-11 RX ADMIN — Medication 10 ML: at 15:09

## 2018-07-11 RX ADMIN — POLYETHYLENE GLYCOL (3350) 17 G: 17 POWDER, FOR SOLUTION ORAL at 15:20

## 2018-07-11 RX ADMIN — Medication 10 ML: at 06:00

## 2018-07-11 RX ADMIN — GABAPENTIN 600 MG: 300 CAPSULE ORAL at 10:06

## 2018-07-11 NOTE — PROGRESS NOTES
Bedside and Verbal shift change report given to [address] (oncoming nurse) by Elsa Hernandez RN (offgoing nurse). Report included the following information SBAR, Kardex, Procedure Summary, Intake/Output, MAR, Accordion and Recent Results.

## 2018-07-11 NOTE — PROGRESS NOTES
Bedside and Verbal shift change report given to Rafal Cartwright RN (oncoming nurse) by Yane Juarez RN (offgoing nurse). Report included the following information SBAR, Kardex, Intake/Output, MAR, Recent Results and Med Rec Status.

## 2018-07-11 NOTE — PROGRESS NOTES
Neurology Progress Note    Interval Hx:      Follow up: Acute onset left leg weakness, severe lower back pain, right leg tingling/ paresthesias, urinary retention, generalized hyperreflexia    When I initially entered room, pt lying in bed appeared calm, LPN elping her change sheets or something. LPN said she'd be finished in 2 minutes so I left and came back a few minutes later. When I entered room, pt resting in bed, didn't appear to be in any severe pain. Asked pt if any of her symptoms were improved, she said no, pain was still 10/10 (despite Palliative care initiating low-dose opioid medication and pt being on Gabapentin 10/ 10). She continued to c/o feeling light-headed and nauseated after LP. Reviewed with patient that all the extensive testing has turned out normal so far, no neurologic cause for her symptoms that I can see. She started crying saying she doesn't know why a cause hasn't been found. I asked her if there was anything going on recently that had been stressing her out, she started crying more and said \"I'm not crazy. \" I told patient I wasn't calling her crazy, I was trying to see if there was anything that could potentially be causing her symptoms as her testing has been normal so far. So far CSF has returned normal (WBC 4, Protein 38, Gram stain negative to date, HSV PCR negative), other than few hundred RBCs which is likely d/t traumatic tap. MS panel pending    CRP, CBC normal (other than mildly elevated Abs Lymphocytes 3.9). Pending labs (to look for other etiologies): ISABELLA, Copper, anti-ds DNA, urine heavy metals, lyme antibodies, SSA, SSB, NMO antibodies.          Current Facility-Administered Medications:     methylPREDNISolone ((Solu-MEDROL) 1,000 mg in 0.9% sodium chloride (MBP/ADV) 100 mL, 1 g, IntraVENous, DAILY, Pancho Trent MD, Last Rate: 100 mL/hr at 07/10/18 0947, 1,000 mg at 07/10/18 0947    lidocaine (LIDODERM) 5 % patch 3 Patch, 3 Patch, TransDERmal, Q24H, Violeta Navarrete MD, 3 Patch at 07/10/18 1219    gabapentin (NEURONTIN) capsule 600 mg, 600 mg, Oral, TID, Floyd Chan MD, 600 mg at 07/11/18 1006    HYDROmorphone (DILAUDID) tablet 1 mg, 1 mg, Oral, R5U PRN, Elicia Coburn MD, 1 mg at 07/11/18 0528    FLUoxetine (PROzac) capsule 20 mg, 20 mg, Oral, QHS, Violeta Navarrete MD, 20 mg at 07/10/18 2106    sodium chloride (NS) flush 5-10 mL, 5-10 mL, IntraVENous, Q8H, Yeni Eaton MD, 10 mL at 07/11/18 0600    sodium chloride (NS) flush 5-10 mL, 5-10 mL, IntraVENous, PRN, Yeni Eaton MD, 10 mL at 07/09/18 1400    acetaminophen (TYLENOL) tablet 650 mg, 650 mg, Oral, Q4H PRN, Yeni Eaton MD, 650 mg at 07/10/18 0658    enoxaparin (LOVENOX) injection 40 mg, 40 mg, SubCUTAneous, Q24H, Yeni Eaton MD, 40 mg at 07/10/18 1716    polyethylene glycol (MIRALAX) packet 17 g, 17 g, Oral, BID, Yeni Eaton MD, 17 g at 07/11/18 1006    Physical Exam    Patient Vitals for the past 12 hrs:   Temp Pulse Resp BP SpO2   07/11/18 0758 98 °F (36.7 °C) 65 18 140/87 95 %   07/11/18 0700 - (!) 55 - - -   07/11/18 0410 98.5 °F (36.9 °C) 67 18 115/78 95 %   07/10/18 2325 98 °F (36.7 °C) 84 18 121/65 94 %   07/10/18 2301 - 64 - - -       Awake, resting in bed    Focused Neurological Exam   Extraocular movement intact bilaterally  Face appears symmetric  Hearing / Language intact to casual conversation    Sensory:   Left lower ext: absent pinprick, LT  Right lower ext: intact LT, prick    Motor:   5/5 strength both arms and right leg  Left leg with at least 3/5 strength (some component of giveaway or pain-related weakness)  Gait: not tested      Impression/ Plan    34 y.o. female with   Acute onset left leg weakness, severe lower back pain, right leg tingling/ paresthesias, urinary retention, generalized hyperreflexia    MRIs Brain, Cervical, Thoracic, Lumbar spine  (all +/- contrast) have not shown etiology (specifically no cord or CNS lesions, no spinal stenosis, no disc herniation)     LP with minimal RBCs (not suggestive of SAH) and no evidence of infection or inflammation (normal WBCs, Protein, Glucose, Gram stain, HSV PCR). MS Panel and other labs pending     Pt was stared on 3 day course of IV solumedrol with the possibility that she could have a form of transverse myelitis that did not show up on imaging, but that is unlikely now as her CSF studies do not support any inflammatory process. Would still complete the 3rd day of Solumedrol tomorrow. Pt behavior (and absence of objective abnormalities on MRI and LP) suggests there there is a psychological component to her pain, and she is very defensive regarding that. She does not seem open to talking with Psychiatry. D/w her that she will need to f/u with Dr Gilda Damian [facility] [place] to go over the final lab results to see if any source of her neurologic complaints can be identified. If the remainder of the lab testing is normal, then symptoms are psycho-somatic.               Signed By: Toan Saini MD     July 11, 2018

## 2018-07-11 NOTE — ROUTINE PROCESS
Bedside shift change report given to Dionisio (oncoming nurse) by Rylie Jacob (offgoing nurse). Report included the following information SBAR, Kardex, Procedure Summary and MAR.

## 2018-07-11 NOTE — PROGRESS NOTES
Problem: Falls - Risk of  Goal: *Absence of Falls  Document Awa Fall Risk and appropriate interventions in the flowsheet. Outcome: Progressing Towards Goal  Fall Risk Interventions:  Mobility Interventions: OT consult for ADLs, Patient to call before getting OOB, PT Consult for mobility concerns, PT Consult for assist device competence         Medication Interventions: Patient to call before getting OOB, Teach patient to arise slowly         History of Falls Interventions: Bed/chair exit alarm, Door open when patient unattended, Investigate reason for fall        Problem: Pressure Injury - Risk of  Goal: *Prevention of pressure injury  Document Say Scale and appropriate interventions in the flowsheet.    Outcome: Progressing Towards Goal  Pressure Injury Interventions:  Sensory Interventions: Assess changes in LOC, Assess need for specialty bed, Check visual cues for pain    Moisture Interventions: Internal/External urinary devices, Absorbent underpads    Activity Interventions: Increase time out of bed, PT/OT evaluation    Mobility Interventions: HOB 30 degrees or less, PT/OT evaluation    Nutrition Interventions: Document food/fluid/supplement intake, Offer support with meals,snacks and hydration    Friction and Shear Interventions: Apply protective barrier, creams and emollients, Minimize layers

## 2018-07-11 NOTE — PROGRESS NOTES
Gabino Higuera [address] 
566 [facility], [address] [place] [address] 
[phone] Medical Progress Note NAME: Jessica Poole :  1990 MRM:  [id number] Date/Time: 2018  11:38 AM   
 
 
  
Subjective: Chief Complaint:  Pain: low back, severe, constant, no relief with any interventions so far. Patient was sitting up texting on her phone when I walked in the room. Just like yesterday, she immediately laid back down and began crying, stating her pain is 10/10. She states it hasn't dropped below am 8 since admission and was a 10 all day yesterday despite the addition of lidocaine patches and oral Dilaudid. She reports she has received absolutely no pain relief from anything we have done, including the Dilaudid. She requested IV Dilaudid. ROS: 
(bold if positive, if negative) Nausea Tolerating Diet Objective:  
 
 
Vitals:  
 
 
  
Last 24hrs VS reviewed since prior progress note. Most recent are: 
 
Visit Vitals  /87 (BP 1 Location: Right arm, BP Patient Position: At rest)  Pulse 65  Temp 98 °F (36.7 °C)  Resp 18  Ht 5' 6\" (1.676 m)  Wt 99.3 kg (219 lb)  SpO2 95%  Breastfeeding No  
 BMI 35.35 kg/m2 SpO2 Readings from Last 6 Encounters:  
18 95% 18 99% 18 96% 10/30/17 97% 10/21/16 98% 10/21/16 96% Intake/Output Summary (Last 24 hours) at 18 1137 Last data filed at 18 3063 Gross per 24 hour Intake              300 ml Output             2150 ml Net            -1850 ml Exam:  
 
Physical Exam: 
 
Gen:  Well-developed, obese HEENT:  Pink conjunctivae, PERRL, hearing intact to voice, moist mucous membranes Neck:  Supple, without masses, thyroid non-tender Resp:  No accessory muscle use, clear breath sounds without wheezes rales or rhonchi 
Card:  No murmurs, normal S1, S2 without thrills, bruits or peripheral edema Abd:  Soft, non-tender, non-distended, normoactive bowel sounds are present, no palpable organomegaly and no detectable hernias Lymph:  No cervical or inguinal adenopathy Musc:  No cyanosis or clubbing Skin:  No rashes or ulcers, skin turgor is good Neuro:  Cranial nerves are grossly intact, no focal motor weakness, follows commands appropriately Psych:  Good insight, oriented to person, place and time, alert, initially smiling then immediately tearful, very labile emotionally Medications Reviewed: (see below) Lab Data Reviewed: (see below) 
 
______________________________________________________________________ Medications:  
 
Current Facility-Administered Medications Medication Dose Route Frequency  polyethylene glycol (MIRALAX) packet 17 g  17 g Oral Q1H  
 methylPREDNISolone ((Solu-MEDROL) 1,000 mg in 0.9% sodium chloride (MBP/ADV) 100 mL  1 g IntraVENous DAILY  lidocaine (LIDODERM) 5 % patch 3 Patch  3 Patch TransDERmal Q24H  
 gabapentin (NEURONTIN) capsule 600 mg  600 mg Oral TID  
 HYDROmorphone (DILAUDID) tablet 1 mg  1 mg Oral Q4H PRN  
 FLUoxetine (PROzac) capsule 20 mg  20 mg Oral QHS  sodium chloride (NS) flush 5-10 mL  5-10 mL IntraVENous Q8H  
 sodium chloride (NS) flush 5-10 mL  5-10 mL IntraVENous PRN  
 acetaminophen (TYLENOL) tablet 650 mg  650 mg Oral Q4H PRN  
 enoxaparin (LOVENOX) injection 40 mg  40 mg SubCUTAneous Q24H  polyethylene glycol (MIRALAX) packet 17 g  17 g Oral BID Lab Review:  
 
Recent Labs  
   07/10/18 
 0419  07/08/18 
 1636 WBC  8.4  10.9 HGB  13.0  13.8 HCT  39.6  41.4 PLT  205  221 Recent Labs  
   07/10/18 
 0419  07/08/18 
 1636 NA  143  141  
K  4.2  3.9 CL  108  108 CO2  26  23 GLU  83  112* BUN  16  15 CREA  0.93  0.80 CA  8.6  8.7 ALB   --   3.5 TBILI   --   0.5 SGOT   --   14* ALT   --   21 Lab Results Component Value Date/Time  Glucose (POC) 96 06/20/2012 01:55 AM  
 Glucose (POC) 93 05/17/2012 04:56 PM  
 
No results for input(s): PH, PCO2, PO2, HCO3, FIO2 in the last 72 hours. No results for input(s): INR in the last 72 hours. No lab exists for component: INREXT, INREXT Lab Results Component Value Date/Time Specimen Description: URINE 07/11/2012 12:00 PM  
 Specimen Description: URINE 06/20/2012 01:58 AM  
 Specimen Description: CEREBROSPINAL FLUID 04/04/2012 03:20 PM  
 
Lab Results Component Value Date/Time Culture result: Culture performed on Unspun Fluid 07/09/2018 04:36 PM  
 Culture result: NO GROWTH AFTER 14 HOURS 07/09/2018 04:36 PM  
 Culture result: NO GROWTH 1 DAY 07/08/2018 03:00 PM  
 
 
 
  
Assessment:  
 
Principal Problem: 
  Left leg weakness (7/8/2018) Active Problems: 
  Numbness in left leg (7/8/2018) Seizures ([address] () Nonintractable migraine (7/8/2018) Urinary incontinence (7/9/2018) Bradycardia, sinus (7/9/2018) Obesity (BMI 30-39.9) (7/9/2018) Plan:  
 
Principal Problem: 
  Left leg weakness (7/8/2018)/Numbness in left leg (7/8/2018)/Back pain 
 - continues with severe symptoms by her report with no relief from any interventions - work up has been entirely normal 
 - discussed with patient and father at length, we have no further diagnostic modalities to offer 
 - Neurology does not think this is transverse myelitis but recommends completing the course of IV steroids just in case 
 - she accused Dr. Quan Chaudhary of telling her that this is \"all in her head\" - in his defense, I discussed the case with him and that is not his conclusion at all; while it is certainly possible that her symptoms are psychological, Dr. Quan Chaudhary has done an outstanding work up and has refrained from drawing any conclusions until all her labs are back and is, in fact, treating her empircally 
 - she is definitely not amenable to talking with Psychiatry - appreciate Dr. Nanda Reyes input, attempted oral opiate therapy with reservations  - the fact that she had ZERO relief with oral opiates leads me to believe that IV opiates would not be helpful and are, in fact, much higher risk; the risk outweighs minimal benefit 
 - her report of ZERO relief with any intervention is certainly suspicious for non-physiologic etiology to her pain Active Problems: 
  Seizures (HCC) () 
 - no recent seizures 
 - not on chronic AED therapy 
 - outpatient follow up with Neurology Nonintractable migraine (7/8/2018) - complaining of post-LP headache with nausea, I'm not convinced she has a CSF leak, so I will defer to Neurology to determine whether a blood patch is appropriate Urinary incontinence (7/9/2018) - no urinary retention that I can find documented by bladder scan - Purewick, no Mccrary indicated Bradycardia, sinus (7/9/2018) - TSH normal, EKG NSR, NSST, visualized by me  
 - asymptomatic, monitor on telemetry I note Dr. Marques Royalty request that she be changed to inpatient status. I respectfully disagree. She has had a negative outpatient work up and has not required any definite inpatient therapy. IV steroids were given empirically without a definite diagnosis and with no result and could have been administered in an outpatient setting Total time spent in patient care: 25 minutes Care Plan discussed with: Patient, Family, Care Manager, Nursing Staff and Leonie Walker Discussed:  Code Status, Care Plan and D/C Planning Prophylaxis:  Lovenox Disposition:  Home w/Family 
        
___________________________________________________ Attending Physician: Philippe Pinto MD

## 2018-07-11 NOTE — PROGRESS NOTES
Palliative Medicine Consult    Patient Name: Narinder Albert  YOB: 1990    Date of Initial Consult: 7/10/18  Reason for Consult: Overwhelming symptoms  Requesting Provider: Dr. Mello Potter  Primary Care Physician: Cordell Murphy NP      SUMMARY:   Narinder Albert is a 34 y.o. with a past history of seizure d/o(not on meds), who was admitted on 7/8/2018 from home with a diagnosis of numbness in LE and back pain. Current medical issues leading to Palliative Medicine involvement include: overwhelming symptoms    Chart reviewed/HPI-patient with prior h/o of lower extremity weakness/numbness abput 7 years ago. She was admitted to [facility] but cause never known and she was discharged after 1 week in the hospital. During that time, she was receiving high doses of IV dilaudid to the point where her mom apparently weaned her off once she returned home. Patient was returning from the beach last Thursday and as she was getting into her car, her left leg \"gave out\" and had associated back pain. Thought maybe related to sleeping on different bed at the beach but over the weekend, symptoms progressed to include left leg numbness to right leg numbness and then numbness from the waist distally. In addition, she describes severe back pain. Workup to include MRI brain, cervical, thoracic and lumbar spine, LP, neurology evaluation have not been conclusive. There is concern about transverse myelitis and patient been started on high dose IV solumedrol. In addition, she has been placed on gabapentin 600 mg(dose increased earlier today by Neurology). Since being in the hospital, she has used ultram, zanaflex, percocet and toradol and states none have provided her any relief of her back pain. Patient with other issues to include urinary incontinence and constipation issues.       reviewed  Only 2 scripts in the last year->one for valium on 4/24/18 and 1 for percocet 5/325 on 9/7/17    SH-lives with her boyfriend, Denies any illicit drug use. No significant alcohol intake. Works at [employer] with [employer]. Mom is a nurse with [employer]:   1. Acute back pain, unclear etiology-both neuropathic and somatic pain  2. Numbness in lower extremities, unclear cause-workup in progress  3. Debility         PLAN:   1. Patient is downstairs having a blood patch done. Reviewed the chart, talked with Dr. Michael Chester, bedside nurse, Dr. Crow Hamilton and finally her father. Patient has not had relief of any pain->still rating at 10/10 despite multiple doses of po dilaudid, solumedrol, and increase does of gabapentin. She is not sleeping and still constipated despite multiple doses of miralax. 2. Pain management-remains a complicated situation since we are not completely sure of the cause of the pain and test results so far are unrevealing. There appears to be both somatic and neuropathic type pain but also concern about psycho-somatic component. Discussed with her father because we ask questions about stressors, etc does not mean we do not believe her pain, but this is good medicine because sometimes physical complaints can manifest from psychosocial stressors. At this point, she has been placed on gabapentin 600 mg tid, lioderm patch and also solumedrol->both good for neuropathic type pain. No other meds have been helpful at this time(see list above that has been tried). On 7/10/18, we then had a long discussion about opioids and explained that our country is in a much different place then we were 7 years ago when this occurred and prescribing opioids without a definite reason is very challenging. We are being careful with opioid prescribing not only because we want to protect our licenses but we also want to protect her from the effects of opioids if she does not need exposure. We never know which patient may become \"addicted\" when they are started.  I am willing to prescribe to increase the dilaudid to 2 mg po every 4 hours prn pain for 24 hours to see if any relief. I still do not think any indication for IV opioids at this time. Explained again at length this will not be a long term solution, especially if testing continues to be negative. If no relief at all with the increased dose of dilaudid then I believe there is no benefit in continuing opioids since she has shown no benefit. 3. Discussed with bedside nurses. 4. Patient will need to continue bowel regimen and have added soap suds enema x 1 tonight. 5. Our team will f/u tomorrow  6. Initial consult note routed to primary continuity provider  7. Communicated plan of care with: Palliative IDT       GOALS OF CARE / TREATMENT PREFERENCES:   [====Goals of Care====]  GOALS OF CARE:  Patient/Health Care Proxy Stated Goals: Rehabilitation      TREATMENT PREFERENCES:   Code Status: Full Code    Advance Care Planning:  Advance Care Planning 7/8/2018   Patient's Healthcare Decision Maker is: Legal Next of Kin   Confirm Advance Directive None       Medical Interventions: Full interventions  Other Instructions:           The palliative care team has discussed with patient / health care proxy about goals of care / treatment preferences for patient.  [====Goals of Care====]         HISTORY:     History obtained from: chart, patient, father    CHIEF COMPLAINT: back pain    HPI/SUBJECTIVE:    The patient is:   [x] Verbal and participatory  [] Non-participatory due to:   Patient is downstairs receiving a blood patch so no exam can be done but chart reviewed    Clinical Pain Assessment (nonverbal scale for severity on nonverbal patients):   Clinical Pain Assessment  Severity: 10  Location: lower back  Character: throbbing  Duration: days  Effect: difficult to ambulate  Factors: movement  Frequency: constant            FUNCTIONAL ASSESSMENT:     Palliative Performance Scale (PPS):  PPS: 50       PSYCHOSOCIAL/SPIRITUAL SCREENING:     Advance Care Planning:  Advance Care Planning 7/8/2018   Patient's Healthcare Decision Maker is: Legal Next of Kin   Confirm Advance Directive None        Any spiritual / Latter-day concerns:  [] Yes /  [x] No    Caregiver Burnout:  [] Yes /  [x] No /  [] No Caregiver Present      Anticipatory grief assessment:   [x] Normal  / [] Maladaptive       ESAS Anxiety: Anxiety: 1    ESAS Depression: Depression: 0        REVIEW OF SYSTEMS:     Positive and pertinent negative findings in ROS are noted above in HPI. The following systems were [x] reviewed / [] unable to be reviewed as noted in HPI  Other findings are noted below. Systems: constitutional, ears/nose/mouth/throat, respiratory, gastrointestinal, genitourinary, musculoskeletal, integumentary, neurologic, psychiatric, endocrine. Positive findings noted below. Modified ESAS Completed by: provider   Fatigue: 1 Drowsiness: 0   Depression: 0 Pain: 10   Anxiety: 1 Nausea: 0   Anorexia: 0 Dyspnea: 0     Constipation: Yes              PHYSICAL EXAM:     From RN flowsheet:  Wt Readings from Last 3 Encounters:   07/08/18 219 lb (99.3 kg)   04/27/18 251 lb (113.9 kg)   04/27/18 246 lb 14.6 oz (112 kg)     Blood pressure 133/71, pulse 67, temperature 98.4 °F (36.9 °C), resp. rate 15, height 5' 6\" (1.676 m), weight 219 lb (99.3 kg), SpO2 94 %, not currently breastfeeding.     Pain Scale 1: Numeric (0 - 10)  Pain Intensity 1: 10  Pain Onset 1: ongoing  Pain Location 1: Back  Pain Orientation 1: Lower  Pain Description 1: Aching  Pain Intervention(s) 1: Medication (see MAR)  Last bowel movement, if known:     No exam today     HISTORY:     Principal Problem:    Left leg weakness (7/8/2018)    Active Problems:    Numbness in left leg (7/8/2018)      Seizures (HCC) ()      Nonintractable migraine (7/8/2018)      Urinary incontinence (7/9/2018)      Bradycardia, sinus (7/9/2018)      Obesity (BMI 30-39.9) (7/9/2018)      Past Medical History:   Diagnosis Date    Headache     Headache(784.0)  Obesity (BMI 30-39. 9) 7/9/2018    Seizures (HCC)       Past Surgical History:   Procedure Laterality Date    ENDOSCOPY, COLON, DIAGNOSTIC      HX APPENDECTOMY      HX CHOLECYSTECTOMY      HX GI      HX GYN        Family History   Problem Relation Age of Onset    Hypertension Father     Diabetes Maternal Grandmother     Coronary Artery Disease Maternal Grandfather     Diabetes Maternal Grandfather     Diabetes Paternal Grandfather       History reviewed, no pertinent family history. Social History   Substance Use Topics    Smoking status: Current Every Day Smoker    Smokeless tobacco: Never Used    Alcohol use Yes     Allergies   Allergen Reactions    Codeine Other (comments)     Tachycardia    Sulfa (Sulfonamide Antibiotics) Itching    Bactrim [Sulfamethoprim Ds] Itching    Chocolate Flavor Other (comments)     Pt reports migraines    Clindamycin Other (comments)     Chest tightness    Compazine [Prochlorperazine Edisylate] Other (comments)     Neck and jaw spasm  And difficultly swallow     Morphine Hives    Peanut Other (comments)     migraines    Reglan [Metoclopramide] Nausea and Vomiting and Vertigo     Sleep disorder and shaking    Sulfur Swelling     Throat closes.     Zofran [Ondansetron Hcl (Pf)] Hives    Metaxalone Palpitations      Current Facility-Administered Medications   Medication Dose Route Frequency    zolpidem (AMBIEN) tablet 5 mg  5 mg Oral QHS    methylPREDNISolone ((Solu-MEDROL) 1,000 mg in 0.9% sodium chloride (MBP/ADV) 100 mL  1 g IntraVENous DAILY    lidocaine (LIDODERM) 5 % patch 3 Patch  3 Patch TransDERmal Q24H    gabapentin (NEURONTIN) capsule 600 mg  600 mg Oral TID    HYDROmorphone (DILAUDID) tablet 1 mg  1 mg Oral Q4H PRN    FLUoxetine (PROzac) capsule 20 mg  20 mg Oral QHS    sodium chloride (NS) flush 5-10 mL  5-10 mL IntraVENous Q8H    sodium chloride (NS) flush 5-10 mL  5-10 mL IntraVENous PRN    acetaminophen (TYLENOL) tablet 650 mg  650 mg Oral Q4H PRN    enoxaparin (LOVENOX) injection 40 mg  40 mg SubCUTAneous Q24H    polyethylene glycol (MIRALAX) packet 17 g  17 g Oral BID          LAB AND IMAGING FINDINGS:     Lab Results   Component Value Date/Time    WBC 8.4 07/10/2018 04:19 AM    HGB 13.0 07/10/2018 04:19 AM    PLATELET 713 33/73/7274 04:19 AM     Lab Results   Component Value Date/Time    Sodium 143 07/10/2018 04:19 AM    Potassium 4.2 07/10/2018 04:19 AM    Chloride 108 07/10/2018 04:19 AM    CO2 26 07/10/2018 04:19 AM    BUN 16 07/10/2018 04:19 AM    Creatinine 0.93 07/10/2018 04:19 AM    Calcium 8.6 07/10/2018 04:19 AM      Lab Results   Component Value Date/Time    AST (SGOT) 14 (L) 07/08/2018 04:36 PM    Alk. phosphatase 72 07/08/2018 04:36 PM    Protein, total 6.5 07/08/2018 04:36 PM    Albumin 3.5 07/08/2018 04:36 PM    Globulin 3.0 07/08/2018 04:36 PM     No results found for: INR, PTMR, PTP, PT1, PT2, APTT   No results found for: IRON, FE, TIBC, IBCT, PSAT, FERR   No results found for: PH, PCO2, PO2  No components found for: GLPOC   No results found for: CPK, CKMB             Total time:   Counseling / coordination time, spent as noted above:   > 50% counseling / coordination?:     Prolonged service was provided for  []30 min   []75 min in face to face time in the presence of the patient, spent as noted above. Time Start:   Time End:   Note: this can only be billed with 96951 (initial) or 21071 (follow up). If multiple start / stop times, list each separately.

## 2018-07-11 NOTE — ANESTHESIA PREPROCEDURE EVALUATION
Anesthetic History   No history of anesthetic complications            Review of Systems / Medical History  Patient summary reviewed and pertinent labs reviewed    Pulmonary          Smoker         Neuro/Psych     seizures         Cardiovascular  Within defined limits                Exercise tolerance: >4 METS     GI/Hepatic/Renal  Within defined limits              Endo/Other        Obesity     Other Findings              Physical Exam    Airway  Mallampati: II  TM Distance: 4 - 6 cm  Neck ROM: normal range of motion   Mouth opening: Normal     Cardiovascular  Regular rate and rhythm,  S1 and S2 normal,  no murmur, click, rub, or gallop  Rhythm: regular  Rate: normal         Dental  No notable dental hx       Pulmonary  Breath sounds clear to auscultation               Abdominal  GI exam deferred       Other Findings            Anesthetic Plan    ASA: 2  Anesthesia type: epidural            Anesthetic plan and risks discussed with: Patient      Patient with complicated neurologic history but does most likely have a PDPH. She has had an LP in the past and subsequent blood patch so she is familiar with the symptoms and procedure of a blood patch. Still, had lengthy discussion with the patient about the risks of the procedure (bleeding, infection, nerve damage, not making headache better, wet tap with worse or prolonged headache). I also discussed that this will not help her other neurologic problems. Patient understands and wishes to proceed with blood patch.

## 2018-07-11 NOTE — ANESTHESIA PROCEDURE NOTES
Blood Patch  Performed by: Nicolasa Al by: Brijesh Ba   Start Time:  7/11/2018 4:50 PM  End Time:  7/11/2018 5:01 PM    Pre-procedure:    Indication: spinal headache      Preanesthetic Checklist: patient identified, risks and benefits discussed, site marked, anesthesia consent, patient being monitored and timeout performed      Timeout Time:  16:50    Blood Patch:   Monitoring:  EKG, continuous pulse oximetry and blood pressure monitoring  Location of venous blood draw:  Arm  Patient Position:  Seated  Prep Region:  Lumbar  Prep: Betadine      Local:  Lidocaine 1%  Location:  L4-5  Total Dose (mL):  3  Injection Technique:  BELKIS saline  Needle Type:  Tuohy  Needle Gauge:  18 G  Sterile Blood Injected (mL):  21    Assessment:   Attempts:  1      Patient tolerance:  Patient tolerated the procedure well with no immediate complications

## 2018-07-11 NOTE — PHYSICIAN ADVISORY
Letter of Status Determination:   Recommend hospitalization status upgraded from   OBSERVATION  to INPATIENT  Status     Pt Name:  Jessica Poole   MR#   [address] [facility] # [id number] /  [id number]   [facility]#  [id number]   [address] [place] [address] [phone]  @ [facility]   Hospitalization date  7/8/2018  1:50 AM   Current Attending Physician  Mina Shaffer MD   Principal diagnosis  Left leg weakness      Clinicals  34 y.o. y.o  female hospitalized with above diagnosis   The pt has had extensive workup for her acute lower extremity weakness, hyperreflexia, and urinary symptoms. Neurologist has been receiving IV Solu-medrol 1gm daily      Milliman (MCG) criteria   Does  apply    STATUS DETERMINATION  Based on documented presenting clinical data, comorbid conditions, high risk of adverse events and deterioration, it is our recommendation that the patient's status should be upgraded from OBSERVATION to INPATIENT status. The final decision of the patient's hospitalization status depends on the attending physician's judgment. Additional comments     Payor: Bindu Tabares / Plan: [place] PPO / Product Type: PPO /         Aneudy Ocampo MD MPH FACP     Physician Advisor    [address]   [facility] Medical Staff, [address] [place] [address]    Cell  [phone]        [phone]    .

## 2018-07-11 NOTE — ANESTHESIA POSTPROCEDURE EVALUATION
Post-Anesthesia Evaluation and Assessment    Patient: [place] Carver MRN: [id number]  SSN: [id number]    YOB: 1990  Age: 34 y.o. Sex: female       Cardiovascular Function/Vital Signs  Visit Vitals    /84    Pulse 78    Temp 36.9 °C (98.4 °F)    Resp 11    Ht 5' 6\" (1.676 m)    Wt 99.3 kg (219 lb)    SpO2 96%    Breastfeeding No    BMI 35.35 kg/m2       Patient is status post epidural anesthesia for Procedure(s):  SPECIAL PROCEDURE OUTSIDE OF OR. Nausea/Vomiting: None    Postoperative hydration reviewed and adequate. Pain:  Pain Scale 1: Numeric (0 - 10) (07/11/18 1641)  Pain Intensity 1: 10 (07/11/18 1641)   Managed    Neurological Status:   Neuro  Neurologic State: Alert (07/11/18 0915)  Orientation Level: Oriented X4 (07/11/18 0915)  Cognition: Follows commands; Appropriate safety awareness; Appropriate for age attention/concentration; Appropriate decision making (07/11/18 0915)  LLE Motor Response: Numbness (07/11/18 0915)   At baseline    Mental Status and Level of Consciousness: Arousable    Pulmonary Status:   O2 Device: Room air (07/11/18 1641)   Adequate oxygenation and airway patent    Complications related to anesthesia: None    Post-anesthesia assessment completed. No concerns. Care turned over to nurse.   In report, patient should not receive next dose of lovenox until 9 pm.    Signed By: Shelly Conley MD     July 11, 2018

## 2018-07-11 NOTE — PERIOP NOTES
TRANSFER - OUT REPORT:    Verbal report given to KULWANT Padilla on [employer]  being transferred to [address] [place] [address] for routine post - op       Report consisted of patients Situation, Background, Assessment and   Recommendations(SBAR). Information from the following report(s) SBAR, Procedure Summary, MAR and Recent Results was reviewed with the receiving nurse. Lines:   Peripheral IV 07/11/18 Right Arm (Active)   Site Assessment Clean, dry, & intact 7/11/2018  3:00 PM   Phlebitis Assessment 0 7/11/2018  3:00 PM   Infiltration Assessment 0 7/11/2018  3:00 PM   Dressing Status Clean, dry, & intact 7/11/2018  3:00 PM   Dressing Type Transparent 7/11/2018  3:00 PM   Hub Color/Line Status Blue 7/11/2018  3:00 PM   Alcohol Cap Used Yes 7/11/2018  3:00 PM        Opportunity for questions and clarification was provided. Patient transported with:   Registered Nurse           Discussed with receiving nurse that per anesthesia, patient is not to receive Lovenox until at least 2100.

## 2018-07-12 LAB
COPPER SERPL-MCNC: 73 UG/DL (ref 72–166)
DSDNA AB SER-ACNC: 1 IU/ML (ref 0–9)
ENA SS-A AB SER-ACNC: <0.2 AI (ref 0–0.9)
ENA SS-B AB SER-ACNC: 0.5 AI (ref 0–0.9)

## 2018-07-12 PROCEDURE — 97530 THERAPEUTIC ACTIVITIES: CPT

## 2018-07-12 PROCEDURE — 97535 SELF CARE MNGMENT TRAINING: CPT

## 2018-07-12 PROCEDURE — 74011250636 HC RX REV CODE- 250/636: Performed by: PSYCHIATRY & NEUROLOGY

## 2018-07-12 PROCEDURE — 74011250637 HC RX REV CODE- 250/637: Performed by: INTERNAL MEDICINE

## 2018-07-12 PROCEDURE — 74011250636 HC RX REV CODE- 250/636: Performed by: INTERNAL MEDICINE

## 2018-07-12 PROCEDURE — 74011000258 HC RX REV CODE- 258: Performed by: PSYCHIATRY & NEUROLOGY

## 2018-07-12 PROCEDURE — 99218 HC RM OBSERVATION: CPT

## 2018-07-12 PROCEDURE — 74011250637 HC RX REV CODE- 250/637: Performed by: PSYCHIATRY & NEUROLOGY

## 2018-07-12 PROCEDURE — 97116 GAIT TRAINING THERAPY: CPT

## 2018-07-12 PROCEDURE — 74011250637 HC RX REV CODE- 250/637: Performed by: FAMILY MEDICINE

## 2018-07-12 RX ORDER — ZOLPIDEM TARTRATE 5 MG/1
5 TABLET ORAL
Status: DISCONTINUED | OUTPATIENT
Start: 2018-07-12 | End: 2018-07-25 | Stop reason: HOSPADM

## 2018-07-12 RX ORDER — AMOXICILLIN 250 MG
1 CAPSULE ORAL 2 TIMES DAILY
Status: DISCONTINUED | OUTPATIENT
Start: 2018-07-12 | End: 2018-07-25 | Stop reason: HOSPADM

## 2018-07-12 RX ORDER — DIPHENHYDRAMINE HCL 25 MG
25 CAPSULE ORAL
Status: DISCONTINUED | OUTPATIENT
Start: 2018-07-12 | End: 2018-07-13

## 2018-07-12 RX ADMIN — POLYETHYLENE GLYCOL (3350) 17 G: 17 POWDER, FOR SOLUTION ORAL at 09:00

## 2018-07-12 RX ADMIN — ENOXAPARIN SODIUM 40 MG: 40 INJECTION SUBCUTANEOUS at 18:02

## 2018-07-12 RX ADMIN — POLYETHYLENE GLYCOL (3350) 17 G: 17 POWDER, FOR SOLUTION ORAL at 18:03

## 2018-07-12 RX ADMIN — DIPHENHYDRAMINE HYDROCHLORIDE 25 MG: 25 CAPSULE ORAL at 20:48

## 2018-07-12 RX ADMIN — HYDROMORPHONE HYDROCHLORIDE 2 MG: 2 TABLET ORAL at 06:21

## 2018-07-12 RX ADMIN — ZOLPIDEM TARTRATE 5 MG: 5 TABLET ORAL at 20:49

## 2018-07-12 RX ADMIN — GABAPENTIN 600 MG: 300 CAPSULE ORAL at 09:00

## 2018-07-12 RX ADMIN — HYDROMORPHONE HYDROCHLORIDE 2 MG: 2 TABLET ORAL at 21:01

## 2018-07-12 RX ADMIN — SODIUM CHLORIDE 1000 MG: 900 INJECTION, SOLUTION INTRAVENOUS at 10:54

## 2018-07-12 RX ADMIN — FLUOXETINE 20 MG: 20 CAPSULE ORAL at 20:51

## 2018-07-12 RX ADMIN — HYDROMORPHONE HYDROCHLORIDE 2 MG: 2 TABLET ORAL at 14:33

## 2018-07-12 RX ADMIN — Medication 10 ML: at 06:21

## 2018-07-12 RX ADMIN — GABAPENTIN 600 MG: 300 CAPSULE ORAL at 18:02

## 2018-07-12 RX ADMIN — ZOLPIDEM TARTRATE 5 MG: 5 TABLET ORAL at 21:01

## 2018-07-12 RX ADMIN — SENNOSIDES AND DOCUSATE SODIUM 1 TABLET: 8.6; 5 TABLET ORAL at 18:03

## 2018-07-12 RX ADMIN — HYDROMORPHONE HYDROCHLORIDE 2 MG: 2 TABLET ORAL at 02:23

## 2018-07-12 RX ADMIN — GABAPENTIN 600 MG: 300 CAPSULE ORAL at 20:51

## 2018-07-12 RX ADMIN — SENNOSIDES AND DOCUSATE SODIUM 1 TABLET: 8.6; 5 TABLET ORAL at 14:33

## 2018-07-12 RX ADMIN — Medication 10 ML: at 21:02

## 2018-07-12 RX ADMIN — HYDROMORPHONE HYDROCHLORIDE 2 MG: 2 TABLET ORAL at 10:20

## 2018-07-12 NOTE — PROGRESS NOTES
[id number]  Bedside and Verbal shift change report given to Michel Deal RN (oncoming nurse) by Demi Kessler RN (offgoing nurse). Report included the following information SBAR, Kardex, Intake/Output, MAR, Recent Results and Med Rec Status. 1950  Pt request enema that was ordered earlier. Enema given. Pt helped to bedside commode x2 for loose, large bm. Will continue to monitor.
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
## 2018-07-12 NOTE — PROGRESS NOTES
Problem: Self Care Deficits Care Plan (Adult)  Goal: *Acute Goals and Plan of Care (Insert Text)  Occupational Therapy Goals  Initiated 7/10/2018  1. Patient will perform ADLs sitting EOB 5 mins without reports of pain supervision/setup within 7 day(s). 2.  Patient will perform lower body ADLs with minimal assistance/contact guard assist within 7 day(s). 3.  Patient will perform bathing with minimal assistance/contact guard assist within 7 day(s). 4.  Patient will perform toilet transfers with minimal assistance/contact guard assist x2 and least restrictive device within 7 day(s). 5.  Patient will perform all aspects of toileting with supervision/set-up within 7 day(s). 6.  Patient will participate in upper extremity therapeutic exercise/activities to increase independence with ADLs with independence for 5 minutes within 7 day(s). 7.  Patient will verbalize/demonstrate 3/3 back precautions during ADL tasks without cues within 7 days. physical Therapy TREATMENT  Patient: Teena Renteria (89 y.o. female)  Date: 7/12/2018  Diagnosis: Left leg weakness  Left leg weakness  epidural blood patch Left leg weakness  Procedure(s) (LRB):  epidural blood patch (N/A) 1 Day Post-Op  Precautions: Back, Fall (L knee buckling)  Chart, physical therapy assessment, plan of care and goals were reviewed. ASSESSMENT:  Pt continues to report LLE weakness and no feeling below waist.Pt wearing diaper with continued incontinence. Pt supine to sit with CGA. Pt to stand with CGA. Pt ambulated 30ft with RW CGA. Pt moves slowly. Pts left leg was stable during ambulation. Pt back to bed with CGA. Pt with improved mobility today. Pt very concerned about diagnosis. Pt will continue to follow. .  Progression toward goals:  []    Improving appropriately and progressing toward goals  [x]    Improving slowly and progressing toward goals  []    Not making progress toward goals and plan of care will be adjusted     PLAN:  Patient continues to benefit from skilled intervention to address the above impairments. Continue treatment per established plan of care. Discharge Recommendations:  Rehab  Further Equipment Recommendations for Discharge:  rolling walker     SUBJECTIVE:       OBJECTIVE DATA SUMMARY:   Critical Behavior:  Neurologic State: Alert  Orientation Level: Oriented X4  Cognition: Follows commands  Safety/Judgement: Awareness of environment, Fall prevention, Insight into deficits  Functional Mobility Training:  Bed Mobility:     Supine to Sit: Contact guard assistance  Sit to Supine: Contact guard assistance           Transfers:  Sit to Stand: Contact guard assistance  Stand to Sit: Contact guard assistance                             Balance:  Sitting: Intact  Standing: Intact; With support  Ambulation/Gait Training:  Distance (ft): 30 Feet (ft)  Assistive Device: Walker, rolling;Gait belt  CGA. Pain:  Pain Scale 1: Numeric (0 - 10)  Pain Intensity 1: 8  Pain Location 1: Back  Pain Orientation 1: Lower  Pain Description 1: Constant  Pain Intervention(s) 1: Medication (see MAR)  Activity Tolerance:   Pt tolerated treatment fairly well. Please refer to the flowsheet for vital signs taken during this treatment. After treatment:   []    Patient left in no apparent distress sitting up in chair  []    Patient left in no apparent distress in bed  []    Call bell left within reach  []    Nursing notified  []    Caregiver present  []    Bed alarm activated    COMMUNICATION/COLLABORATION:   The patients plan of care was discussed with: Physical Therapist    Monisha Cunningham PTA   Time Calculation: 23 mins        .

## 2018-07-12 NOTE — PROGRESS NOTES
Gabino Higuera [facility] 
[address] [facility], [address] [place] [address] 
[phone] Medical Progress Note NAME: Jeannine Hernandez :  1990 MRM:  [id number] Date/Time: 2018  11:04 AM  
 
 
  
Subjective: Chief Complaint:  Pain: low back, moderate to severe, constant, got some relief with increased dose of oral Dilaudid yesterday. Headache better after blood patch ROS: 
(bold if positive, if negative) Nausea Tolerating Diet Objective:  
 
 
Vitals:  
 
 
  
Last 24hrs VS reviewed since prior progress note. Most recent are: 
 
Visit Vitals  /90 (BP 1 Location: Left arm, BP Patient Position: At rest)  Pulse 93  Temp 98.4 °F (36.9 °C)  Resp 16  
 Ht 5' 6\" (1.676 m)  Wt 99.3 kg (219 lb)  SpO2 97%  Breastfeeding No  
 BMI 35.35 kg/m2 SpO2 Readings from Last 6 Encounters:  
18 97% 18 99% 18 96% 10/30/17 97% 10/21/16 98% 10/21/16 96% Intake/Output Summary (Last 24 hours) at 18 1104 Last data filed at 18 1252 Gross per 24 hour Intake                0 ml Output              650 ml Net             -650 ml Exam:  
 
Physical Exam: 
 
Gen:  Well-developed, obese HEENT:  Pink conjunctivae, PERRL, hearing intact to voice, moist mucous membranes Neck:  Supple, without masses, thyroid non-tender Resp:  No accessory muscle use, clear breath sounds without wheezes rales or rhonchi 
Card:  No murmurs, normal S1, S2 without thrills, bruits or peripheral edema Abd:  Soft, non-tender, non-distended, normoactive bowel sounds are present, no palpable organomegaly and no detectable hernias Lymph:  No cervical or inguinal adenopathy Musc:  No cyanosis or clubbing Skin:  No rashes or ulcers, skin turgor is good Neuro:  Cranial nerves are grossly intact, no focal motor weakness, follows commands appropriately Psych:  Good insight, oriented to person, place and time, alert, initially smiling then immediately tearful, very labile emotionally Medications Reviewed: (see below) Lab Data Reviewed: (see below) 
 
______________________________________________________________________ Medications:  
 
Current Facility-Administered Medications Medication Dose Route Frequency  zolpidem (AMBIEN) tablet 5 mg  5 mg Oral QHS  
 HYDROmorphone (DILAUDID) tablet 2 mg  2 mg Oral Q4H PRN  
 methylPREDNISolone ((Solu-MEDROL) 1,000 mg in 0.9% sodium chloride (MBP/ADV) 100 mL  1 g IntraVENous DAILY  lidocaine (LIDODERM) 5 % patch 3 Patch  3 Patch TransDERmal Q24H  
 gabapentin (NEURONTIN) capsule 600 mg  600 mg Oral TID  FLUoxetine (PROzac) capsule 20 mg  20 mg Oral QHS  sodium chloride (NS) flush 5-10 mL  5-10 mL IntraVENous Q8H  
 sodium chloride (NS) flush 5-10 mL  5-10 mL IntraVENous PRN  
 acetaminophen (TYLENOL) tablet 650 mg  650 mg Oral Q4H PRN  
 enoxaparin (LOVENOX) injection 40 mg  40 mg SubCUTAneous Q24H  polyethylene glycol (MIRALAX) packet 17 g  17 g Oral BID Lab Review:  
 
Recent Labs  
   07/10/18 
 0419 WBC  8.4 HGB  13.0 HCT  39.6 PLT  205 Recent Labs  
   07/10/18 
 0419 NA  143  
K  4.2 CL  108 CO2  26 GLU  83 BUN  16  
CREA  0.93  
CA  8.6 Lab Results Component Value Date/Time Glucose (POC) 96 06/20/2012 01:55 AM  
 Glucose (POC) 93 05/17/2012 04:56 PM  
 
No results for input(s): PH, PCO2, PO2, HCO3, FIO2 in the last 72 hours. No results for input(s): INR in the last 72 hours. No lab exists for component: INREXT, INREXT Lab Results Component Value Date/Time Specimen Description: URINE 07/11/2012 12:00 PM  
 Specimen Description: URINE 06/20/2012 01:58 AM  
 Specimen Description: CEREBROSPINAL FLUID 04/04/2012 03:20 PM  
 
Lab Results Component Value Date/Time  Culture result: NO GROWTH 3 DAYS 07/09/2018 04:36 PM  
 Culture result: NO GROWTH 1 DAY 07/08/2018 03:00 PM  
 Culture result: MIXED UROGENITAL LUDIVINA ISOLATED 10/21/2016 04:59 PM  
 
 
 
  
Assessment:  
 
Principal Problem: 
  Left leg weakness (7/8/2018) Active Problems: 
  Numbness in left leg (7/8/2018) Seizures ([address] () Nonintractable migraine (7/8/2018) Urinary incontinence (7/9/2018) Bradycardia, sinus (7/9/2018) Obesity (BMI 30-39.9) (7/9/2018) Plan:  
 
Principal Problem: 
  Left leg weakness (7/8/2018)/Numbness in left leg (7/8/2018)/Back pain - pain is finally slightly better however, she appears much more comfortable overall 
 - she was able to engage with me without crying and actually laughed several times 
 - unfortunately, because we have no diagnosis, we can't discharge her on her current regimen 
 - she was unable to work with PT/OT yesterday 
 - she wants to pursue inpatient rehab and promises to work with therapy today - CM aware - pain control discussed with Dr. Milton Birmingham  
 - will stop LidoDerm because she states it isn't doing anything Active Problems: 
  Seizures (HCC) () 
 - no recent seizures 
 - not on chronic AED therapy 
 - outpatient follow up with Neurology Nonintractable migraine (7/8/2018) - post-LP headache improved after blood patch Urinary incontinence (7/9/2018) - no urinary retention that I can find documented by bladder scan - Purewick, no Mccrary indicated Bradycardia, sinus (7/9/2018) - TSH normal, EKG NSR, NSST, visualized by me  
 - asymptomatic, monitor on telemetry Constipation 
 - some relief yesterday 
 - requesting another enema today  
 - continue Pericolace and Miralax scheduled I note Dr. Mustapha Choi request that she be changed to inpatient status. I respectfully disagree. She has had a negative outpatient work up and has not required any definite inpatient therapy.   IV steroids were given empirically without a definite diagnosis and work up has not been consistent with any diagnosis that would support their use. Total time spent in patient care: 25 minutes Care Plan discussed with: Patient, Family, Care Manager, Nursing Staff and Leonie Correa Discussed:  Code Status, Care Plan and D/C Planning Prophylaxis:  Lovenox Disposition:  Home w/Family 
        
___________________________________________________ Attending Physician: Dino Colon MD

## 2018-07-12 NOTE — PROGRESS NOTES
Problem: Self Care Deficits Care Plan (Adult)  Goal: *Acute Goals and Plan of Care (Insert Text)  Occupational Therapy Goals  Initiated 7/10/2018  1. Patient will perform ADLs sitting EOB 5 mins without reports of pain supervision/setup within 7 day(s). 2.  Patient will perform lower body ADLs with minimal assistance/contact guard assist within 7 day(s). 3.  Patient will perform bathing with minimal assistance/contact guard assist within 7 day(s). 4.  Patient will perform toilet transfers with minimal assistance/contact guard assist x2 and least restrictive device within 7 day(s). 5.  Patient will perform all aspects of toileting with supervision/set-up within 7 day(s). 6.  Patient will participate in upper extremity therapeutic exercise/activities to increase independence with ADLs with independence for 5 minutes within 7 day(s). 7.  Patient will verbalize/demonstrate 3/3 back precautions during ADL tasks without cues within 7 days. Occupational Therapy TREATMENT  Patient: Narinder Albert (49 y.o. female)  Date: 7/12/2018  Diagnosis: Left leg weakness  Left leg weakness  epidural blood patch Left leg weakness  Procedure(s) (LRB):  epidural blood patch (N/A) 1 Day Post-Op  Precautions: Back, Fall (L knee buckling)  Chart, occupational therapy assessment, plan of care, and goals were reviewed. ASSESSMENT:  Pt seen in OT for LB self-care. Pt received supine in bed. Dad present. Pt is mod I with bed mobility . Seated on EOB, pt educated and instructed how to use reacher and sock aide for LB dressing 2/2 unable to demonstrate tailor sit position from back p!.  Pt demonstrated and verbalized understanding. Performed sit to stand and took a few steps higher in bed at [facility]. At this time, feel pt would benefit from [facility] PT/OT or OP PT pending progress/diagnosis.    Progression toward goals:  [x]       Improving appropriately and progressing toward goals  []       Improving slowly and progressing toward goals  []       Not making progress toward goals and plan of care will be adjusted     PLAN:  Patient continues to benefit from skilled intervention to address the above impairments. Continue treatment per established plan of care. Discharge Recommendations: To Be Determined  Further Equipment Recommendations for Discharge:  TBD     SUBJECTIVE:   Patient stated Keyona Moore still don't know what is wrong.     OBJECTIVE DATA SUMMARY:   Cognitive/Behavioral Status:  Neurologic State: Alert                   Functional Mobility and Transfers for ADLs:  Bed Mobility:  Supine to Sit: Modified independent  Sit to Supine: Contact guard assistance    Transfers:  Sit to Stand: Contact guard assistance          Balance:  Sitting: Intact  Standing: Intact; With support    ADL Intervention:         Lower Body Dressing Assistance  Dressing Assistance: Supervision/set up  Socks: Supervision/set-up  Position Performed: Seated edge of bed  Adaptive Equipment Used: Reacher;Sock aid              Pain:  Pain Scale 1: Numeric (0 - 10)  Pain Intensity 1: 8  Pain Location 1: Back  Pain Orientation 1: Lower  Pain Description 1: Constant  Pain Intervention(s) 1: Medication (see MAR)  Activity Tolerance:   Good  Please refer to the flowsheet for vital signs taken during this treatment.   After treatment:   [] Patient left in no apparent distress sitting up in chair  [x] Patient left in no apparent distress in bed  [x] Call bell left within reach  [] Nursing notified  [x] Caregiver present  [] Bed alarm activated    COMMUNICATION/COLLABORATION:   The patients plan of care was discussed with: Registered Nurse    SHUBHAM Chacko/L  Time Calculation: 20 mins

## 2018-07-12 NOTE — PROGRESS NOTES
Palliative Medicine Consult    Patient Name: René Mackay  YOB: 1990    Date of Initial Consult: 7/10/18  Reason for Consult: Overwhelming symptoms  Requesting Provider: Dr. Edilson Amador  Primary Care Physician: Robinson Abel NP      SUMMARY:   René Mackay is a 34 y.o. with a past history of seizure d/o(not on meds), who was admitted on 7/8/2018 from home with a diagnosis of numbness in LE and back pain. Current medical issues leading to Palliative Medicine involvement include: overwhelming symptoms    Chart reviewed/HPI-patient with prior h/o of lower extremity weakness/numbness abput 7 years ago. She was admitted to [facility] but cause never known and she was discharged after 1 week in the hospital. During that time, she was receiving high doses of IV dilaudid to the point where her mom apparently weaned her off once she returned home. Patient was returning from the beach last Thursday and as she was getting into her car, her left leg \"gave out\" and had associated back pain. Thought maybe related to sleeping on different bed at the beach but over the weekend, symptoms progressed to include left leg numbness to right leg numbness and then numbness from the waist distally. In addition, she describes severe back pain. Workup to include MRI brain, cervical, thoracic and lumbar spine, LP, neurology evaluation have not been conclusive. There is concern about transverse myelitis and patient been started on high dose IV solumedrol. In addition, she has been placed on gabapentin 600 mg(dose increased earlier today by Neurology). Since being in the hospital, she has used ultram, zanaflex, percocet and toradol and states none have provided her any relief of her back pain. Patient with other issues to include urinary incontinence and constipation issues.       reviewed  Only 2 scripts in the last year->one for valium on 4/24/18 and 1 for percocet 5/325 on 9/7/17    SH-lives with her boyfriend, Denies any illicit drug use. No significant alcohol intake. Works at [employer] with [employer]. Mom is a nurse with [employer]:   1. Acute back pain, unclear etiology-both neuropathic and somatic pain  2. Numbness in lower extremities, unclear cause-workup in progress  3. Debility         PLAN:   1. Met with patient and father. She is having some better relief of pain(down to 7/10) and did have a BM after enema last night and asking to repeat today. Still with weakness and numbness in lower legs. She feels like blood patch helped her headache. She slept after meds given for blood patch but did not sleep with ambien. Explained the high dose steroids may be contributing to insomnia. 2. Pain management-remains a complicated situation since we are not completely sure of the cause of the pain and test results so far are unrevealing. There appears to be both somatic and neuropathic type pain but also concern about psycho-somatic component. At this point, she has been placed on gabapentin 600 mg tid, lioderm patch and also solumedrol(last day today)->both good for neuropathic type pain. No other meds have been helpful at this time(see list above that has been tried). On 7/10/18, we then had a long discussion about opioids and explained that [place] country is in a much different place then [place] were 7 years ago when this occurred and prescribing opioids without a definite reason is very challenging. We are being careful with opioid prescribing not only because we want to protect our licenses but we also want to protect her from the effects of opioids if she does not need exposure. We never know which patient may become \"addicted\" when they are started. Has had some relief with the increase dose of dilaudid 2 mg every 4 hours prn pain->used 3 doses since started. Would not change any medications at this time. 3. Constipation-repeat enema today  4.  Discussed with bedside nurses. 5. Our team will f/u while in hospital.   6. Initial consult note routed to primary continuity provider  7. Communicated plan of care with: Palliative IDT       GOALS OF CARE / TREATMENT PREFERENCES:   [====Goals of Care====]  GOALS OF CARE:  Patient/Health Care Proxy Stated Goals: Rehabilitation      TREATMENT PREFERENCES:   Code Status: Full Code    Advance Care Planning:  Advance Care Planning 7/8/2018   Patient's Healthcare Decision Maker is: Legal Next of Kin   Confirm Advance Directive None       Medical Interventions: Full interventions  Other Instructions: The palliative care team has discussed with patient / health care proxy about goals of care / treatment preferences for patient.  [====Goals of Care====]         HISTORY:     History obtained from: chart, patient, father    CHIEF COMPLAINT: back pain    HPI/SUBJECTIVE:    The patient is:   [x] Verbal and participatory  [] Non-participatory due to:   Patient is alert and oriented. Appears more comfortable this morning    Clinical Pain Assessment (nonverbal scale for severity on nonverbal patients):   Clinical Pain Assessment  Severity: 7  Location: lower back  Character: throbbing  Duration: days  Effect: difficult to ambulate  Factors: movement  Frequency: constant            FUNCTIONAL ASSESSMENT:     Palliative Performance Scale (PPS):  PPS: 50       PSYCHOSOCIAL/SPIRITUAL SCREENING:     Advance Care Planning:  Advance Care Planning 7/8/2018   Patient's Healthcare Decision Maker is: Legal Next of Kin   Confirm Advance Directive None        Any spiritual / Roman Catholic concerns:  [] Yes /  [x] No    Caregiver Burnout:  [] Yes /  [x] No /  [] No Caregiver Present      Anticipatory grief assessment:   [x] Normal  / [] Maladaptive       ESAS Anxiety: Anxiety: 1    ESAS Depression: Depression: 0        REVIEW OF SYSTEMS:     Positive and pertinent negative findings in ROS are noted above in HPI.   The following systems were [x] reviewed / [] unable to be reviewed as noted in HPI  Other findings are noted below. Systems: constitutional, ears/nose/mouth/throat, respiratory, gastrointestinal, genitourinary, musculoskeletal, integumentary, neurologic, psychiatric, endocrine. Positive findings noted below. Modified ESAS Completed by: provider   Fatigue: 1 Drowsiness: 0   Depression: 0 Pain: 7   Anxiety: 1 Nausea: 0   Anorexia: 0 Dyspnea: 0     Constipation: Yes     Stool Occurrence(s): 1        PHYSICAL EXAM:     From RN flowsheet:  Wt Readings from Last 3 Encounters:   07/08/18 219 lb (99.3 kg)   04/27/18 251 lb (113.9 kg)   04/27/18 246 lb 14.6 oz (112 kg)     Blood pressure 141/90, pulse 93, temperature 98.4 °F (36.9 °C), resp. rate 16, height 5' 6\" (1.676 m), weight 219 lb (99.3 kg), SpO2 97 %, not currently breastfeeding. Pain Scale 1: Numeric (0 - 10)  Pain Intensity 1: 8  Pain Onset 1: ongoing  Pain Location 1: Back  Pain Orientation 1: Lower  Pain Description 1: Constant  Pain Intervention(s) 1: Medication (see MAR)  Last bowel movement, if known:     General-NAD, calm  Lungs-CTA  CV-RRR  Neuro-numbness remains in the left leg with associated weakness     HISTORY:     Principal Problem:    Left leg weakness (7/8/2018)    Active Problems:    Numbness in left leg (7/8/2018)      Seizures (HCC) ()      Nonintractable migraine (7/8/2018)      Urinary incontinence (7/9/2018)      Bradycardia, sinus (7/9/2018)      Obesity (BMI 30-39.9) (7/9/2018)      Past Medical History:   Diagnosis Date    Headache     Headache(784.0)     Obesity (BMI 30-39. 9) 7/9/2018    Seizures (HCC)       Past Surgical History:   Procedure Laterality Date    ENDOSCOPY, COLON, DIAGNOSTIC      HX APPENDECTOMY      HX CHOLECYSTECTOMY      HX GI      HX GYN        Family History   Problem Relation Age of Onset    Hypertension Father     Diabetes Maternal Grandmother     Coronary Artery Disease Maternal Grandfather     Diabetes Maternal Grandfather     Diabetes Paternal Grandfather       History reviewed, no pertinent family history. Social History   Substance Use Topics    Smoking status: Current Every Day Smoker    Smokeless tobacco: Never Used    Alcohol use Yes     Allergies   Allergen Reactions    Codeine Other (comments)     Tachycardia    Sulfa (Sulfonamide Antibiotics) Itching    Bactrim [Sulfamethoprim Ds] Itching    Chocolate Flavor Other (comments)     Pt reports migraines    Clindamycin Other (comments)     Chest tightness    Compazine [Prochlorperazine Edisylate] Other (comments)     Neck and jaw spasm  And difficultly swallow     Morphine Hives    Peanut Other (comments)     migraines    Reglan [Metoclopramide] Nausea and Vomiting and Vertigo     Sleep disorder and shaking    Sulfur Swelling     Throat closes.     Zofran [Ondansetron Hcl (Pf)] Hives    Metaxalone Palpitations      Current Facility-Administered Medications   Medication Dose Route Frequency    zolpidem (AMBIEN) tablet 5 mg  5 mg Oral QHS    HYDROmorphone (DILAUDID) tablet 2 mg  2 mg Oral Q4H PRN    methylPREDNISolone ((Solu-MEDROL) 1,000 mg in 0.9% sodium chloride (MBP/ADV) 100 mL  1 g IntraVENous DAILY    lidocaine (LIDODERM) 5 % patch 3 Patch  3 Patch TransDERmal Q24H    gabapentin (NEURONTIN) capsule 600 mg  600 mg Oral TID    FLUoxetine (PROzac) capsule 20 mg  20 mg Oral QHS    sodium chloride (NS) flush 5-10 mL  5-10 mL IntraVENous Q8H    sodium chloride (NS) flush 5-10 mL  5-10 mL IntraVENous PRN    acetaminophen (TYLENOL) tablet 650 mg  650 mg Oral Q4H PRN    enoxaparin (LOVENOX) injection 40 mg  40 mg SubCUTAneous Q24H    polyethylene glycol (MIRALAX) packet 17 g  17 g Oral BID          LAB AND IMAGING FINDINGS:     Lab Results   Component Value Date/Time    WBC 8.4 07/10/2018 04:19 AM    HGB 13.0 07/10/2018 04:19 AM    PLATELET 442 08/01/6353 04:19 AM     Lab Results   Component Value Date/Time    Sodium 143 07/10/2018 04:19 AM    Potassium 4.2 07/10/2018 04:19 AM    Chloride 108 07/10/2018 04:19 AM    CO2 26 07/10/2018 04:19 AM    BUN 16 07/10/2018 04:19 AM    Creatinine 0.93 07/10/2018 04:19 AM    Calcium 8.6 07/10/2018 04:19 AM      Lab Results   Component Value Date/Time    AST (SGOT) 14 (L) 07/08/2018 04:36 PM    Alk. phosphatase 72 07/08/2018 04:36 PM    Protein, total 6.5 07/08/2018 04:36 PM    Albumin 3.5 07/08/2018 04:36 PM    Globulin 3.0 07/08/2018 04:36 PM     No results found for: INR, PTMR, PTP, PT1, PT2, APTT   No results found for: IRON, FE, TIBC, IBCT, PSAT, FERR   No results found for: PH, PCO2, PO2  No components found for: GLPOC   No results found for: CPK, CKMB             Total time:   Counseling / coordination time, spent as noted above:   > 50% counseling / coordination?:     Prolonged service was provided for  []30 min   []75 min in face to face time in the presence of the patient, spent as noted above. Time Start:   Time End:   Note: this can only be billed with 51224 (initial) or 84180 (follow up). If multiple start / stop times, list each separately.

## 2018-07-13 ENCOUNTER — APPOINTMENT (OUTPATIENT)
Dept: GENERAL RADIOLOGY | Age: 28
DRG: 059 | End: 2018-07-13
Attending: INTERNAL MEDICINE
Payer: COMMERCIAL

## 2018-07-13 LAB
ALB CSF/SERPL: 5 {RATIO} (ref 0–8)
ALBUMIN CSF-MCNC: 19 MG/DL (ref 11–48)
ALBUMIN SERPL-MCNC: 3.6 G/DL (ref 3.5–5.5)
AQP4 H2O CHANNEL AB SERPL IA-ACNC: 6.9 U/ML (ref 0–3)
ARSENIC 24H UR-MCNC: 15 UG/L (ref 0–50)
COLLECT DURATION TIME UR: NORMAL HR
CREAT UR-MCNC: 0.63 G/L (ref 0.3–3)
IGG CSF-MCNC: 1.9 MG/DL (ref 0–8.6)
IGG SERPL-MCNC: 659 MG/DL (ref 700–1600)
IGG SYNTH RATE SER+CSF CALC-MRATE: ABNORMAL MG/DAY
IGG/ALB CLEAR SER+CSF-RTO: 0.5 (ref 0–0.7)
IGG/ALB CSF: 0.1 {RATIO} (ref 0–0.25)
INORG ARSENIC UR-MCNC: NORMAL UG/L (ref 0–19)
LEAD 24H UR-MCNC: NORMAL UG/L (ref 0–49)
MERCURY 24H UR-MCNC: NORMAL UG/L (ref 0–19)
OLIGOCLONAL BANDS.IT SER+CSF QL: ABNORMAL
SPECIMEN VOL ?TM UR: NORMAL ML

## 2018-07-13 PROCEDURE — 97116 GAIT TRAINING THERAPY: CPT

## 2018-07-13 PROCEDURE — 74011250637 HC RX REV CODE- 250/637: Performed by: FAMILY MEDICINE

## 2018-07-13 PROCEDURE — 74018 RADEX ABDOMEN 1 VIEW: CPT

## 2018-07-13 PROCEDURE — 97535 SELF CARE MNGMENT TRAINING: CPT

## 2018-07-13 PROCEDURE — 74011250637 HC RX REV CODE- 250/637: Performed by: INTERNAL MEDICINE

## 2018-07-13 PROCEDURE — 74011250636 HC RX REV CODE- 250/636: Performed by: INTERNAL MEDICINE

## 2018-07-13 PROCEDURE — 99218 HC RM OBSERVATION: CPT

## 2018-07-13 PROCEDURE — 97530 THERAPEUTIC ACTIVITIES: CPT

## 2018-07-13 PROCEDURE — 74011250637 HC RX REV CODE- 250/637: Performed by: PSYCHIATRY & NEUROLOGY

## 2018-07-13 RX ORDER — SORBITOL SOLUTION 70 %
30 SOLUTION, ORAL MISCELLANEOUS DAILY PRN
Status: DISCONTINUED | OUTPATIENT
Start: 2018-07-13 | End: 2018-07-25 | Stop reason: HOSPADM

## 2018-07-13 RX ORDER — DIPHENHYDRAMINE HCL 25 MG
50 CAPSULE ORAL
Status: DISCONTINUED | OUTPATIENT
Start: 2018-07-13 | End: 2018-07-25 | Stop reason: HOSPADM

## 2018-07-13 RX ADMIN — DIPHENHYDRAMINE HYDROCHLORIDE 50 MG: 25 CAPSULE ORAL at 21:50

## 2018-07-13 RX ADMIN — SENNOSIDES AND DOCUSATE SODIUM 1 TABLET: 8.6; 5 TABLET ORAL at 09:35

## 2018-07-13 RX ADMIN — POLYETHYLENE GLYCOL (3350) 17 G: 17 POWDER, FOR SOLUTION ORAL at 09:47

## 2018-07-13 RX ADMIN — HYDROMORPHONE HYDROCHLORIDE 2 MG: 2 TABLET ORAL at 09:35

## 2018-07-13 RX ADMIN — GABAPENTIN 600 MG: 300 CAPSULE ORAL at 09:34

## 2018-07-13 RX ADMIN — DIPHENHYDRAMINE HYDROCHLORIDE 25 MG: 25 CAPSULE ORAL at 09:47

## 2018-07-13 RX ADMIN — POLYETHYLENE GLYCOL (3350) 17 G: 17 POWDER, FOR SOLUTION ORAL at 17:39

## 2018-07-13 RX ADMIN — ZOLPIDEM TARTRATE 5 MG: 5 TABLET ORAL at 21:38

## 2018-07-13 RX ADMIN — DIPHENHYDRAMINE HYDROCHLORIDE 50 MG: 25 CAPSULE ORAL at 14:59

## 2018-07-13 RX ADMIN — HYDROMORPHONE HYDROCHLORIDE 2 MG: 2 TABLET ORAL at 01:00

## 2018-07-13 RX ADMIN — SORBITOL 30 ML: 258.2 SOLUTION ORAL at 17:39

## 2018-07-13 RX ADMIN — HYDROMORPHONE HYDROCHLORIDE 2 MG: 2 TABLET ORAL at 17:39

## 2018-07-13 RX ADMIN — SENNOSIDES AND DOCUSATE SODIUM 1 TABLET: 8.6; 5 TABLET ORAL at 17:39

## 2018-07-13 RX ADMIN — HYDROMORPHONE HYDROCHLORIDE 2 MG: 2 TABLET ORAL at 13:23

## 2018-07-13 RX ADMIN — Medication 10 ML: at 13:23

## 2018-07-13 RX ADMIN — GABAPENTIN 800 MG: 300 CAPSULE ORAL at 15:50

## 2018-07-13 RX ADMIN — HYDROMORPHONE HYDROCHLORIDE 2 MG: 2 TABLET ORAL at 21:38

## 2018-07-13 RX ADMIN — ZOLPIDEM TARTRATE 5 MG: 5 TABLET ORAL at 23:02

## 2018-07-13 RX ADMIN — HYDROMORPHONE HYDROCHLORIDE 2 MG: 2 TABLET ORAL at 05:13

## 2018-07-13 RX ADMIN — GABAPENTIN 800 MG: 300 CAPSULE ORAL at 21:38

## 2018-07-13 RX ADMIN — FLUOXETINE 20 MG: 20 CAPSULE ORAL at 21:38

## 2018-07-13 RX ADMIN — ENOXAPARIN SODIUM 40 MG: 40 INJECTION SUBCUTANEOUS at 17:39

## 2018-07-13 RX ADMIN — Medication 10 ML: at 21:39

## 2018-07-13 RX ADMIN — Medication 10 ML: at 06:00

## 2018-07-13 NOTE — PROGRESS NOTES
Palliative Medicine Consult    Patient Name: Jeannine Hernandez  YOB: 1990    Date of Initial Consult: 7/10/18  Reason for Consult: Overwhelming symptoms  Requesting Provider: Dr. Micheal Manzano  Primary Care Physician: Asaf Mcclendon NP      SUMMARY:   Jeannine Hernandez is a 34 y.o. with a past history of seizure d/o(not on meds), who was admitted on 7/8/2018 from home with a diagnosis of numbness in LE and back pain. Current medical issues leading to Palliative Medicine involvement include: overwhelming symptoms    Chart reviewed/HPI-patient with prior h/o of lower extremity weakness/numbness abput 7 years ago. She was admitted to [facility] but cause never known and she was discharged after 1 week in the hospital. During that time, she was receiving high doses of IV dilaudid to the point where her mom apparently weaned her off once she returned home. Patient was returning from the beach last Thursday and as she was getting into her car, her left leg \"gave out\" and had associated back pain. Thought maybe related to sleeping on different bed at the beach but over the weekend, symptoms progressed to include left leg numbness to right leg numbness and then numbness from the waist distally. In addition, she describes severe back pain. Workup to include MRI brain, cervical, thoracic and lumbar spine, LP, neurology evaluation have not been conclusive. There is concern about transverse myelitis and patient been started on high dose IV solumedrol. In addition, she has been placed on gabapentin 600 mg(dose increased earlier today by Neurology). Since being in the hospital, she has used ultram, zanaflex, percocet and toradol and states none have provided her any relief of her back pain. Patient with other issues to include urinary incontinence and constipation issues.       reviewed  Only 2 scripts in the last year->one for valium on 4/24/18 and 1 for percocet 5/325 on 9/7/17    SH-lives with her boyfriend, Denies any illicit drug use. No significant alcohol intake. Works at [employer] with [employer]. Mom is a nurse with [employer]:   1. Acute back pain, unclear etiology-both neuropathic and somatic pain  2. Numbness in lower extremities, unclear cause-workup in progress  3. Debility         PLAN:   1. Met with patient and father. Still with pain in back with the associated numbness in legs. She still feels constipated(but KUB appears unremarkable). Insomnia has remained an issue. 2. Pain management-remains a complicated situation since we are not completely sure of the cause of the pain and test results so far are unrevealing. There appears to be both somatic and neuropathic type pain but also concern about psycho-somatic component. At this point, will increase gabapentin to 800 mg tid(essentially max dose but occasionally as high as 900 mg tid), lidoderm patch. On 7/10/18, we then had a long discussion about opioids and explained that [place] country is [place] a [place] then [place] were 7 years ago when this occurred and prescribing opioids without a definite reason is very challenging. We are being careful with opioid prescribing not only because we want to protect our licenses but we also want to protect her from the effects of opioids if she does not need exposure. We never know which patient may become \"addicted\" when they are started. Will not change her dilaudid dosing and will continue 2 mg every 4 hours prn->she used 6 doses in last 24 hours. Would not change her opioid dosing. 3. Constipation-continues to focus on her stools. KUB is unremarkable. She is having watery/\"pudding like stools\" which is to be expected given the miralax and enemas. Will add sorbitol 30 ml daily prn.   4. Discussed with bedside nurse and Dr. Gopla Reza. 5. Would not increase pain regimen this weekend.  Our team will f/u Monday and on-call provider available this weekend if any acute issues  6. Initial consult note routed to primary continuity provider  7. Communicated plan of care with: Palliative IDT       GOALS OF CARE / TREATMENT PREFERENCES:   [====Goals of Care====]  GOALS OF CARE:  Patient/Health Care Proxy Stated Goals: Rehabilitation      TREATMENT PREFERENCES:   Code Status: Full Code    Advance Care Planning:  Advance Care Planning 7/8/2018   Patient's Healthcare Decision Maker is: Legal Next of Kin   Confirm Advance Directive None       Medical Interventions: Full interventions  Other Instructions: The palliative care team has discussed with patient / health care proxy about goals of care / treatment preferences for patient.  [====Goals of Care====]         HISTORY:     History obtained from: chart, patient, father    CHIEF COMPLAINT: back pain    HPI/SUBJECTIVE:    The patient is:   [x] Verbal and participatory  [] Non-participatory due to:   Patient is alert and oriented. Appears comfortable and able to make some jokes with us. Clinical Pain Assessment (nonverbal scale for severity on nonverbal patients):   Clinical Pain Assessment  Severity: 7  Location: lower back  Character: throbbing  Duration: days  Effect: difficult to ambulate  Factors: movement  Frequency: constant            FUNCTIONAL ASSESSMENT:     Palliative Performance Scale (PPS):  PPS: 50       PSYCHOSOCIAL/SPIRITUAL SCREENING:     Advance Care Planning:  Advance Care Planning 7/8/2018   Patient's Healthcare Decision Maker is: Legal Next of Kin   Confirm Advance Directive None        Any spiritual / Protestant concerns:  [] Yes /  [x] No    Caregiver Burnout:  [] Yes /  [x] No /  [] No Caregiver Present      Anticipatory grief assessment:   [x] Normal  / [] Maladaptive       ESAS Anxiety: Anxiety: 1    ESAS Depression: Depression: 0        REVIEW OF SYSTEMS:     Positive and pertinent negative findings in ROS are noted above in HPI.   The following systems were [x] reviewed / [] unable to be reviewed as noted in HPI  Other findings are noted below. Systems: constitutional, ears/nose/mouth/throat, respiratory, gastrointestinal, genitourinary, musculoskeletal, integumentary, neurologic, psychiatric, endocrine. Positive findings noted below. Modified ESAS Completed by: provider   Fatigue: 1 Drowsiness: 0   Depression: 0 Pain: 7   Anxiety: 1 Nausea: 0   Anorexia: 0 Dyspnea: 0     Constipation: Yes     Stool Occurrence(s): 1        PHYSICAL EXAM:     From RN flowsheet:  Wt Readings from Last 3 Encounters:   07/08/18 219 lb (99.3 kg)   04/27/18 251 lb (113.9 kg)   04/27/18 246 lb 14.6 oz (112 kg)     Blood pressure (!) 142/91, pulse 82, temperature 98.5 °F (36.9 °C), resp. rate 16, height 5' 6\" (1.676 m), weight 219 lb (99.3 kg), SpO2 95 %, not currently breastfeeding. Pain Scale 1: Numeric (0 - 10)  Pain Intensity 1: 7  Pain Onset 1: constant  Pain Location 1: Back  Pain Orientation 1: Posterior  Pain Description 1: Aching  Pain Intervention(s) 1: Medication (see MAR)  Last bowel movement, if known:     General-NAD, calm  Lungs-CTA  CV-RRR  Neuro-numbness remains in the left leg with associated weakness, mild TTP lower back area     HISTORY:     Principal Problem:    Left leg weakness (7/8/2018)    Active Problems:    Numbness in left leg (7/8/2018)      Seizures (HCC) ()      Nonintractable migraine (7/8/2018)      Urinary incontinence (7/9/2018)      Bradycardia, sinus (7/9/2018)      Obesity (BMI 30-39.9) (7/9/2018)      Past Medical History:   Diagnosis Date    Headache     Headache(784.0)     Obesity (BMI 30-39. 9) 7/9/2018    Seizures (HCC)       Past Surgical History:   Procedure Laterality Date    ENDOSCOPY, COLON, DIAGNOSTIC      HX APPENDECTOMY      HX CHOLECYSTECTOMY      HX GI      HX GYN        Family History   Problem Relation Age of Onset    Hypertension Father     Diabetes Maternal Grandmother     Coronary Artery Disease Maternal Grandfather     Diabetes Maternal Grandfather  Diabetes Paternal Grandfather       History reviewed, no pertinent family history. Social History   Substance Use Topics    Smoking status: Current Every Day Smoker    Smokeless tobacco: Never Used    Alcohol use Yes     Allergies   Allergen Reactions    Codeine Other (comments)     Tachycardia    Sulfa (Sulfonamide Antibiotics) Itching    Bactrim [Sulfamethoprim Ds] Itching    Chocolate Flavor Other (comments)     Pt reports migraines    Clindamycin Other (comments)     Chest tightness    Compazine [Prochlorperazine Edisylate] Other (comments)     Neck and jaw spasm  And difficultly swallow     Morphine Hives    Peanut Other (comments)     migraines    Reglan [Metoclopramide] Nausea and Vomiting and Vertigo     Sleep disorder and shaking    Sulfur Swelling     Throat closes.     Zofran [Ondansetron Hcl (Pf)] Hives    Metaxalone Palpitations      Current Facility-Administered Medications   Medication Dose Route Frequency    diphenhydrAMINE (BENADRYL) capsule 50 mg  50 mg Oral Q6H PRN    gabapentin (NEURONTIN) capsule 800 mg  800 mg Oral TID    sorbitol 70 % solution 30 mL  30 mL Oral DAILY PRN    zolpidem (AMBIEN) tablet 5 mg  5 mg Oral QHS PRN    senna-docusate (PERICOLACE) 8.6-50 mg per tablet 1 Tab  1 Tab Oral BID    zolpidem (AMBIEN) tablet 5 mg  5 mg Oral QHS    HYDROmorphone (DILAUDID) tablet 2 mg  2 mg Oral Q4H PRN    FLUoxetine (PROzac) capsule 20 mg  20 mg Oral QHS    sodium chloride (NS) flush 5-10 mL  5-10 mL IntraVENous Q8H    sodium chloride (NS) flush 5-10 mL  5-10 mL IntraVENous PRN    acetaminophen (TYLENOL) tablet 650 mg  650 mg Oral Q4H PRN    enoxaparin (LOVENOX) injection 40 mg  40 mg SubCUTAneous Q24H    polyethylene glycol (MIRALAX) packet 17 g  17 g Oral BID          LAB AND IMAGING FINDINGS:     Lab Results   Component Value Date/Time    WBC 8.4 07/10/2018 04:19 AM    HGB 13.0 07/10/2018 04:19 AM    PLATELET 585 21/25/0212 04:19 AM     Lab Results   Component Value Date/Time    Sodium 143 07/10/2018 04:19 AM    Potassium 4.2 07/10/2018 04:19 AM    Chloride 108 07/10/2018 04:19 AM    CO2 26 07/10/2018 04:19 AM    BUN 16 07/10/2018 04:19 AM    Creatinine 0.93 07/10/2018 04:19 AM    Calcium 8.6 07/10/2018 04:19 AM      Lab Results   Component Value Date/Time    AST (SGOT) 14 (L) 07/08/2018 04:36 PM    Alk. phosphatase 72 07/08/2018 04:36 PM    Protein, total 6.5 07/08/2018 04:36 PM    Albumin 3.5 07/08/2018 04:36 PM    Globulin 3.0 07/08/2018 04:36 PM     No results found for: INR, PTMR, PTP, PT1, PT2, APTT   No results found for: IRON, FE, TIBC, IBCT, PSAT, FERR   No results found for: PH, PCO2, PO2  No components found for: GLPOC   No results found for: CPK, CKMB             Total time:   Counseling / coordination time, spent as noted above:   > 50% counseling / coordination?:     Prolonged service was provided for  []30 min   []75 min in face to face time in the presence of the patient, spent as noted above. Time Start:   Time End:   Note: this can only be billed with 08678 (initial) or 55260 (follow up). If multiple start / stop times, list each separately.

## 2018-07-13 NOTE — DISCHARGE SUMMARY
Physician Interim Discharge Summary     Patient ID:  Nikole Alvarado  [id number]  89 y.o.  1990    Admit date: 7/8/2018      Discharge date and time: TBD    Admission Diagnoses: Left leg weakness  Left leg weakness  epidural blood patch    Discharge Diagnoses:  Principal Diagnosis Left leg weakness                                            Principal Problem:    Left leg weakness (7/8/2018)    Active Problems:    Numbness in left leg (7/8/2018)      Seizures (HCC) ()      Nonintractable migraine (7/8/2018)      Urinary incontinence (7/9/2018)      Bradycardia, sinus (7/9/2018)      Obesity (BMI 30-39.9) (7/9/2018)         Resolved Problems:  Problem List as of 7/13/2018  Date Reviewed: 7/9/2018          Codes Class Noted - Resolved    Urinary incontinence ICD-10-CM: R32  ICD-9-CM: 788.30  7/9/2018 - Present        Bradycardia, sinus ICD-10-CM: R00.1  ICD-9-CM: 427.89  7/9/2018 - Present        Obesity (BMI 30-39.9) (Chronic) ICD-10-CM: E66.9  ICD-9-CM: 278.00  7/9/2018 - Present        * (Principal)Left leg weakness ICD-10-CM: R29.898  ICD-9-CM: 729.89  7/8/2018 - Present        Numbness in left leg ICD-10-CM: R20.0  ICD-9-CM: 782.0  7/8/2018 - Present        Seizures (HCC) (Chronic) ICD-10-CM: R56.9  ICD-9-CM: 780.39  Unknown - Present        Nonintractable migraine (Chronic) ICD-10-CM: Y29.310  ICD-9-CM: 346.10  7/8/2018 - Present                Hospital Course: This is an interim summary and covers the hospitalization from admission to 7/13/2018. Ms. Brenda Lee was admitted to the Hospitalist Service on the 5th floor for treatment of LE weakness and pain. She was evaluated by Orthopedics and Neurology and underwent and extensive imaging and serologic work up which was completely normal.  She continued to have pain. Neurology treated her empirically with steroids without any improvement. She was treated with neuropathic pain medications with no change in her symptoms.   Palliative Care was consulted and reluctantly started her on opiate therapy which provided some relief of pain, but there remains no diagnosis for which such therapy would be indicated. She remained incontinent of urine without evidence of urinary retention. She was mobilized by PT/OT and recommended for inpatient rehab. Final discharge summary will be done by the discharging physician.        Signed:  Jerald Moore MD  7/13/2018  1:02 PM

## 2018-07-13 NOTE — PROGRESS NOTES
Problem: Patient Education: Go to Patient Education Activity  Goal: Patient/Family Education  physical Therapy TREATMENT  Patient: Crys Mendez (77 y.o. female)  Date: 7/13/2018  Diagnosis: Left leg weakness  Left leg weakness  epidural blood patch Left leg weakness  Procedure(s) (LRB):  epidural blood patch (N/A) 2 Days Post-Op  Precautions: Back, Fall (L knee buckling)  Chart, physical therapy assessment, plan of care and goals were reviewed. ASSESSMENT:  Family member assisted pt into diaper prior to PT entering room as pt is still incontanent of urine. Pt sitting on EOB. Pt to stand with bed elevated CGA. Pt ambulated  45ft very slowly with RW CGA. Pt with left LE weakness but no instability observed with ambulation. Pt reports increased  back pain with mobility. Pt constantly looks down at her feet /legs reporting very little sensation. Pt fatigues quickly with mobility. Pt left sitting in chair. Pt progress slow. Continue goals. Progression toward goals:  []    Improving appropriately and progressing toward goals  [x]    Improving slowly and progressing toward goals  []    Not making progress toward goals and plan of care will be adjusted     PLAN:  Patient continues to benefit from skilled intervention to address the above impairments. Continue treatment per established plan of care. Discharge Recommendations:  Inpatient Rehab  Further Equipment Recommendations for Discharge:  rolling walker      SUBJECTIVE:       OBJECTIVE DATA SUMMARY:   Critical Behavior:  Neurologic State: Alert  Orientation Level: Oriented X4  Cognition: Follows commands  Safety/Judgement: Awareness of environment, Fall prevention, Insight into deficits  Functional Mobility Training:           Transfers:  Sit to Stand: Contact guard assistance  Stand to Sit: Contact guard assistance                             Balance:  Sitting: Intact  Standing: Intact; With support  Ambulation/Gait Training:  Distance (ft): 45 Feet (ft)  Assistive Device: Gait belt;Walker, rolling  Ambulation - Level of Assistance: Contact guard assistance        Gait Abnormalities: Decreased step clearance; Path deviations        Base of Support: Narrowed  Stance: Left increased;Right increased  Speed/Annetta: Pace decreased (<100 feet/min)             Pain:  Pain Scale 1: Numeric (0 - 10)  Pain Intensity 1: 7  Pain Location 1: Back  Pain Orientation 1: Posterior  Pain Description 1: Aching  Pain Intervention(s) 1: Medication (see MAR)  Activity Tolerance:   Pt tolerated treatment fairly well. Please refer to the flowsheet for vital signs taken during this treatment.   After treatment:   [x]    Patient left in no apparent distress sitting up in chair  []    Patient left in no apparent distress in bed  []    Call bell left within reach  []    Nursing notified  []    Caregiver present  []    Bed alarm activated    COMMUNICATION/COLLABORATION:   The patients plan of care was discussed with: Physical Therapist    Lucia Paez PTA   Time Calculation: 23 mins

## 2018-07-13 NOTE — PROGRESS NOTES
Problem: Self Care Deficits Care Plan (Adult)  Goal: *Acute Goals and Plan of Care (Insert Text)  Occupational Therapy Goals  Initiated 7/10/2018  1. Patient will perform ADLs sitting EOB 5 mins without reports of pain supervision/setup within 7 day(s). 2.  Patient will perform lower body ADLs with minimal assistance/contact guard assist within 7 day(s). 3.  Patient will perform bathing with minimal assistance/contact guard assist within 7 day(s). 4.  Patient will perform toilet transfers with minimal assistance/contact guard assist x2 and least restrictive device within 7 day(s). 5.  Patient will perform all aspects of toileting with supervision/set-up within 7 day(s). 6.  Patient will participate in upper extremity therapeutic exercise/activities to increase independence with ADLs with independence for 5 minutes within 7 day(s). 7.  Patient will verbalize/demonstrate 3/3 back precautions during ADL tasks without cues within 7 days. Occupational Therapy TREATMENT  Patient: Griselda Shed (9 y.o. female)  Date: 7/13/2018  Diagnosis: Left leg weakness  Left leg weakness  epidural blood patch Left leg weakness  Procedure(s) (LRB):  epidural blood patch (N/A) 2 Days Post-Op  Precautions: Back, Fall (L knee buckling)  Chart, occupational therapy assessment, plan of care, and goals were reviewed. ASSESSMENT:  Pt agreeable to OT verbalizing L LE numb. She was able to wash anterior UB, min assist to wash her back seated edge of bed. She washed her thighs and needed assist to wash below her knees to her feet due to spinal precautions. Educated pt as to long handle bath sponge for task. Pt donned pullover shirt with min assist due to painful bringing UE's up to task. She already had washed cristiana area/buttocks in the bed as well as donned a brief due to incontinence. Pt educated as to use of reacher to don pants and min assist to task, decreased coordination for use of reacher and donning L LE.  Pt very fatigued following treatment, HR increased to 122 with activity seated edge of bed, decreased to 104 with seated rest break. Recommend inpatient rehab due to decreased strength and below baseline for ADL's. Progression toward goals:  []       Improving appropriately and progressing toward goals  [x]       Improving slowly and progressing toward goals  []       Not making progress toward goals and plan of care will be adjusted     PLAN:  Patient continues to benefit from skilled intervention to address the above impairments. Continue treatment per established plan of care. Discharge Recommendations: Inpatient rehab  Further Equipment Recommendations for Discharge: None     SUBJECTIVE:   Patient stated I am a hot mess right now.     OBJECTIVE DATA SUMMARY:   Cognitive/Behavioral Status:  Neurologic State: Alert  Orientation Level: Oriented X4  Cognition: Follows commands             Functional Mobility and Transfers for ADLs:  Bed Mobility:     Contact guard supine to sit  Transfers:  Sit to Stand: Contact guard assistance          Balance:  Sitting: Intact  Standing: Intact; With support    ADL Intervention:       Grooming  Brushing/Combing Hair: Stand-by assistance (painful to raise her UE's)    Upper Body Bathing  Bathing Assistance: Minimum assistance  Position Performed: Seated edge of bed  Cues: Physical assistance    Lower Body Bathing  Bathing Assistance: Minimum assistance  Lower Body : Minimum assistance (to wash below knees to feet)  Position Performed: Seated edge of bed  Cues: Physical assistance;Verbal cues provided    Upper Body Dressing Assistance  Dressing Assistance: Minimum assistance (painful to raise UE's)  Pullover Shirt: Minimum assistance    Lower Body Dressing Assistance  Pants With Elastic Waist: Minimum assistance (using reacher to don L LE)  Position Performed: Seated edge of bed  Adaptive Equipment Used: Reacher       Pain:  Pain Scale 1: Numeric (0 - 10)  Pain Intensity 1: 7  Pain Location 1: Back  Pain Orientation 1: Posterior  Pain Description 1: Aching  Pain Intervention(s) 1: Medication (see MAR)  Activity Tolerance:   Fair, pt fatigues easily to task  Please refer to the flowsheet for vital signs taken during this treatment.   After treatment:   [] Patient left in no apparent distress sitting up in chair  [x] Patient left in no apparent distress in bed  [x] Call bell left within reach  [] Nursing notified  [x] Caregiver present  [] Bed alarm activated    COMMUNICATION/COLLABORATION:   The patients plan of care was discussed with: Physical Therapy Assistant, Occupational Therapist and     VENECIA Blevins/L  Time Calculation: 34 mins

## 2018-07-13 NOTE — PROGRESS NOTES
Bedside and Verbal shift change report given to Talita Sellers (oncoming nurse) by Tabitha Sequeira (offgoing nurse). Report included the following information SBAR, Kardex, Intake/Output, MAR, Recent Results and Cardiac Rhythm NSR.

## 2018-07-13 NOTE — PROGRESS NOTES
9:59 AM  Per pt concern, verified with Wicho Lipscomb from Physical therapy that pt will be seen 5 times a week.

## 2018-07-13 NOTE — PROGRESS NOTES
Bedside shift change report given to KULWANT Trinidad (oncoming nurse) by Jordyn Fraga (offgoing nurse). Report included the following information SBAR, Kardex, Intake/Output and MAR.

## 2018-07-13 NOTE — PROGRESS NOTES
Gabino Higuera [facility] 
[address] [facility], [address] [place] [address] 
[phone] Medical Progress Note NAME: Omayra Nieto :  1990 MRM:  [id number] Date/Time: 2018  12:17 PM  
 
  
Subjective: Chief Complaint:  Pain: low back, moderate to severe, constant, seems to have had less relief with Dilaudid yesterday. Headache better after blood patch ROS: 
(bold if positive, if negative) Nausea Tolerating Diet Objective:  
 
 
Vitals:  
 
 
  
Last 24hrs VS reviewed since prior progress note. Most recent are: 
 
Visit Vitals  BP (!) 142/91 (BP 1 Location: Left arm, BP Patient Position: At rest)  Pulse 89  Temp 98.5 °F (36.9 °C)  Resp 16  
 Ht 5' 6\" (1.676 m)  Wt 99.3 kg (219 lb)  SpO2 95%  Breastfeeding No  
 BMI 35.35 kg/m2 SpO2 Readings from Last 6 Encounters:  
18 95% 18 99% 18 96% 10/30/17 97% 10/21/16 98% 10/21/16 96% Intake/Output Summary (Last 24 hours) at 18 1217 Last data filed at 18 1768 Gross per 24 hour Intake             1040 ml Output              700 ml Net              340 ml Exam:  
 
Physical Exam: 
 
Gen:  Well-developed, obese HEENT:  Pink conjunctivae, PERRL, hearing intact to voice, moist mucous membranes Neck:  Supple, without masses, thyroid non-tender Resp:  No accessory muscle use, clear breath sounds without wheezes rales or rhonchi 
Card:  No murmurs, normal S1, S2 without thrills, bruits or peripheral edema Abd:  Soft, non-tender, non-distended, normoactive bowel sounds are present, no palpable organomegaly and no detectable hernias Lymph:  No cervical or inguinal adenopathy Musc:  No cyanosis or clubbing Skin:  No rashes or ulcers, skin turgor is good Neuro:  Cranial nerves are grossly intact, no focal motor weakness, follows commands appropriately Psych:  Good insight, oriented to person, place and time, alert, initially smiling then immediately tearful, very labile emotionally Medications Reviewed: (see below) Lab Data Reviewed: (see below) 
 
______________________________________________________________________ Medications:  
 
Current Facility-Administered Medications Medication Dose Route Frequency  diphenhydrAMINE (BENADRYL) capsule 50 mg  50 mg Oral Q6H PRN  
 zolpidem (AMBIEN) tablet 5 mg  5 mg Oral QHS PRN  
 senna-docusate (PERICOLACE) 8.6-50 mg per tablet 1 Tab  1 Tab Oral BID  zolpidem (AMBIEN) tablet 5 mg  5 mg Oral QHS  
 HYDROmorphone (DILAUDID) tablet 2 mg  2 mg Oral Q4H PRN  
 gabapentin (NEURONTIN) capsule 600 mg  600 mg Oral TID  FLUoxetine (PROzac) capsule 20 mg  20 mg Oral QHS  sodium chloride (NS) flush 5-10 mL  5-10 mL IntraVENous Q8H  
 sodium chloride (NS) flush 5-10 mL  5-10 mL IntraVENous PRN  
 acetaminophen (TYLENOL) tablet 650 mg  650 mg Oral Q4H PRN  
 enoxaparin (LOVENOX) injection 40 mg  40 mg SubCUTAneous Q24H  polyethylene glycol (MIRALAX) packet 17 g  17 g Oral BID Lab Review: No results for input(s): WBC, HGB, HCT, PLT, HGBEXT, HCTEXT, PLTEXT, HGBEXT, HCTEXT, PLTEXT in the last 72 hours. No results for input(s): NA, K, CL, CO2, GLU, BUN, CREA, CA, MG, PHOS, ALB, TBIL, TBILI, SGOT, ALT, INR in the last 72 hours. No lab exists for component: INREXT, INREXT Lab Results Component Value Date/Time Glucose (POC) 96 06/20/2012 01:55 AM  
 Glucose (POC) 93 05/17/2012 04:56 PM  
 
No results for input(s): PH, PCO2, PO2, HCO3, FIO2 in the last 72 hours. No results for input(s): INR in the last 72 hours. No lab exists for component: INREXT, INREXT Lab Results Component Value Date/Time Specimen Description: URINE 07/11/2012 12:00 PM  
 Specimen Description: URINE 06/20/2012 01:58 AM  
 Specimen Description: CEREBROSPINAL FLUID 04/04/2012 03:20 PM  
 
Lab Results Component Value Date/Time  Culture result: Culture performed on Unspun Fluid 07/09/2018 04:36 PM  
 Culture result: NO GROWTH ON SOLID MEDIA AT 4 DAYS 07/09/2018 04:36 PM  
 Culture result: NO GROWTH THUS FAR IN THIO BROTH, HOLDING 7 DAYS 07/09/2018 04:36 PM  
 
 
 
  
Assessment:  
 
Principal Problem: 
  Left leg weakness (7/8/2018) Active Problems: 
  Numbness in left leg (7/8/2018) Seizures ([address] () Nonintractable migraine (7/8/2018) Urinary incontinence (7/9/2018) Bradycardia, sinus (7/9/2018) Obesity (BMI 30-39.9) (7/9/2018) Plan:  
 
Principal Problem: 
  Left leg weakness (7/8/2018)/Numbness in left leg (7/8/2018)/Back pain - pain overall improved but not at goal 
 - remain without a diagnosis for which opiate therapy is indicated, so I agree with Dr. Laquita Padilla that escalating dosing isn't reasonable 
 - long discussion with patient and her father about work up to date 
 - everything sent off has returned except Aquaphorin 4 Ab for NMO; everything has been normal 
 - patient is understandably frustrated about a lack of a diagnosis; I again offered transfer to [facility] for second opinion and she declines because she states she feels we have done a very thorough work up and her father verbalized his agreement 
 - at this point, I recommend we focus on rehab - CM pursuing inpatient rehab Active Problems: 
  Seizures (HCC) () 
 - no recent seizures 
 - not on chronic AED therapy 
 - outpatient follow up with Neurology Nonintractable migraine (7/8/2018) - post-LP headache improved after blood patch Urinary incontinence (7/9/2018) - no urinary retention that I can find documented by bladder scan - Purewick, no Mccrary indicated Bradycardia, sinus (7/9/2018) - TSH normal, EKG NSR, NSST, visualized by me  
 - asymptomatic, monitor on telemetry   Constipation 
 - having stools with pudding-like consistency, as would be expected on Miralax and Pericolace 
 - she still feels constipated so will get KUB 
 - if there is significant stool remaining in her colon, we could dose her with methylnaltrexone, Dr. Ronel Villarreal agrees Total time spent in patient care: 35 minutes Care Plan discussed with: Patient, Family, Care Manager, Nursing Staff, >50% of time spent in counseling and coordination of care and Dr. Ronel Villarreal Discussed:  Code Status, Care Plan and D/C Planning Prophylaxis:  Lovenox Disposition:  Home w/Family 
        
___________________________________________________ Attending Physician: Mina Shaffer MD

## 2018-07-14 ENCOUNTER — APPOINTMENT (OUTPATIENT)
Dept: MRI IMAGING | Age: 28
DRG: 059 | End: 2018-07-14
Attending: PSYCHIATRY & NEUROLOGY
Payer: COMMERCIAL

## 2018-07-14 ENCOUNTER — APPOINTMENT (OUTPATIENT)
Dept: GENERAL RADIOLOGY | Age: 28
DRG: 059 | End: 2018-07-14
Attending: STUDENT IN AN ORGANIZED HEALTH CARE EDUCATION/TRAINING PROGRAM
Payer: COMMERCIAL

## 2018-07-14 ENCOUNTER — APPOINTMENT (OUTPATIENT)
Dept: INTERVENTIONAL RADIOLOGY/VASCULAR | Age: 28
DRG: 059 | End: 2018-07-14
Attending: INTERNAL MEDICINE
Payer: COMMERCIAL

## 2018-07-14 PROBLEM — G36.0 NEUROMYELITIS OPTICA SPECTRUM DISORDER (HCC): Status: ACTIVE | Noted: 2018-07-14

## 2018-07-14 PROBLEM — G36.0 NEUROMYELITIS OPTICA (HCC): Status: ACTIVE | Noted: 2018-07-14

## 2018-07-14 LAB
INR PPP: 1 (ref 0.9–1.1)
PROTHROMBIN TIME: 10.2 SEC (ref 9–11.1)

## 2018-07-14 PROCEDURE — 99218 HC RM OBSERVATION: CPT

## 2018-07-14 PROCEDURE — 85610 PROTHROMBIN TIME: CPT | Performed by: STUDENT IN AN ORGANIZED HEALTH CARE EDUCATION/TRAINING PROGRAM

## 2018-07-14 PROCEDURE — 77030018719 HC DRSG PTCH ANTIMIC J&J -A

## 2018-07-14 PROCEDURE — C1752 CATH,HEMODIALYSIS,SHORT-TERM: HCPCS

## 2018-07-14 PROCEDURE — 77030038269 HC DRN EXT URIN PURWCK BARD -A

## 2018-07-14 PROCEDURE — 74011250636 HC RX REV CODE- 250/636: Performed by: INTERNAL MEDICINE

## 2018-07-14 PROCEDURE — 74011250637 HC RX REV CODE- 250/637: Performed by: INTERNAL MEDICINE

## 2018-07-14 PROCEDURE — 74011000258 HC RX REV CODE- 258: Performed by: INTERNAL MEDICINE

## 2018-07-14 PROCEDURE — 36556 INSERT NON-TUNNEL CV CATH: CPT

## 2018-07-14 PROCEDURE — 74011250636 HC RX REV CODE- 250/636

## 2018-07-14 PROCEDURE — 36415 COLL VENOUS BLD VENIPUNCTURE: CPT | Performed by: STUDENT IN AN ORGANIZED HEALTH CARE EDUCATION/TRAINING PROGRAM

## 2018-07-14 PROCEDURE — 74011250637 HC RX REV CODE- 250/637: Performed by: PSYCHIATRY & NEUROLOGY

## 2018-07-14 PROCEDURE — 74011000250 HC RX REV CODE- 250: Performed by: INTERNAL MEDICINE

## 2018-07-14 PROCEDURE — 74011250637 HC RX REV CODE- 250/637: Performed by: FAMILY MEDICINE

## 2018-07-14 PROCEDURE — 76000 FLUOROSCOPY <1 HR PHYS/QHP: CPT

## 2018-07-14 PROCEDURE — 6A551Z3 PHERESIS OF PLASMA, MULTIPLE: ICD-10-PCS | Performed by: INTERNAL MEDICINE

## 2018-07-14 PROCEDURE — 74011000250 HC RX REV CODE- 250

## 2018-07-14 PROCEDURE — C1894 INTRO/SHEATH, NON-LASER: HCPCS

## 2018-07-14 PROCEDURE — 02HV33Z INSERTION OF INFUSION DEVICE INTO SUPERIOR VENA CAVA, PERCUTANEOUS APPROACH: ICD-10-PCS | Performed by: ANESTHESIOLOGY

## 2018-07-14 PROCEDURE — 65660000000 HC RM CCU STEPDOWN

## 2018-07-14 PROCEDURE — P9045 ALBUMIN (HUMAN), 5%, 250 ML: HCPCS | Performed by: INTERNAL MEDICINE

## 2018-07-14 PROCEDURE — 74011250636 HC RX REV CODE- 250/636: Performed by: PSYCHIATRY & NEUROLOGY

## 2018-07-14 PROCEDURE — 36514 APHERESIS PLASMA: CPT

## 2018-07-14 PROCEDURE — 71045 X-RAY EXAM CHEST 1 VIEW: CPT

## 2018-07-14 RX ORDER — ALBUMIN HUMAN 50 G/1000ML
2000 SOLUTION INTRAVENOUS EVERY OTHER DAY
Status: COMPLETED | OUTPATIENT
Start: 2018-07-14 | End: 2018-07-22

## 2018-07-14 RX ORDER — SODIUM CHLORIDE 9 MG/ML
2000 INJECTION, SOLUTION INTRAVENOUS EVERY OTHER DAY
Status: DISPENSED | OUTPATIENT
Start: 2018-07-14 | End: 2018-07-24

## 2018-07-14 RX ORDER — GADOTERATE MEGLUMINE 376.9 MG/ML
INJECTION INTRAVENOUS
Status: DISPENSED
Start: 2018-07-14 | End: 2018-07-15

## 2018-07-14 RX ORDER — LORAZEPAM 2 MG/ML
2 INJECTION INTRAMUSCULAR ONCE
Status: ACTIVE | OUTPATIENT
Start: 2018-07-14 | End: 2018-07-15

## 2018-07-14 RX ORDER — ANTICOAGULANT CITRATE DEXTROSE SOLUTION FORMULA A 12.25; 11; 3.65 G/500ML; G/500ML; G/500ML
1000 SOLUTION INTRAVENOUS EVERY OTHER DAY
Status: DISPENSED | OUTPATIENT
Start: 2018-07-14 | End: 2018-07-24

## 2018-07-14 RX ORDER — HEPARIN SODIUM 10000 [USP'U]/ML
2800 INJECTION, SOLUTION INTRAVENOUS; SUBCUTANEOUS ONCE
Status: DISPENSED | OUTPATIENT
Start: 2018-07-14 | End: 2018-07-15

## 2018-07-14 RX ORDER — LORAZEPAM 2 MG/ML
2 INJECTION INTRAMUSCULAR ONCE
Status: COMPLETED | OUTPATIENT
Start: 2018-07-14 | End: 2018-07-14

## 2018-07-14 RX ORDER — LIDOCAINE HYDROCHLORIDE 10 MG/ML
INJECTION, SOLUTION EPIDURAL; INFILTRATION; INTRACAUDAL; PERINEURAL
Status: COMPLETED
Start: 2018-07-14 | End: 2018-07-14

## 2018-07-14 RX ORDER — PREGABALIN 50 MG/1
200 CAPSULE ORAL 2 TIMES DAILY
Status: DISCONTINUED | OUTPATIENT
Start: 2018-07-14 | End: 2018-07-16

## 2018-07-14 RX ORDER — BUPIVACAINE HYDROCHLORIDE 5 MG/ML
5 INJECTION, SOLUTION EPIDURAL; INTRACAUDAL ONCE
Status: DISPENSED | OUTPATIENT
Start: 2018-07-14 | End: 2018-07-15

## 2018-07-14 RX ORDER — HEPARIN SODIUM 1000 [USP'U]/ML
INJECTION, SOLUTION INTRAVENOUS; SUBCUTANEOUS
Status: COMPLETED
Start: 2018-07-14 | End: 2018-07-14

## 2018-07-14 RX ORDER — GADOTERATE MEGLUMINE 376.9 MG/ML
20 INJECTION INTRAVENOUS
Status: ACTIVE | OUTPATIENT
Start: 2018-07-14 | End: 2018-07-15

## 2018-07-14 RX ORDER — LIDOCAINE HYDROCHLORIDE 10 MG/ML
20 INJECTION INFILTRATION; PERINEURAL
Status: DISCONTINUED | OUTPATIENT
Start: 2018-07-14 | End: 2018-07-25 | Stop reason: HOSPADM

## 2018-07-14 RX ADMIN — DIPHENHYDRAMINE HYDROCHLORIDE 50 MG: 25 CAPSULE ORAL at 06:37

## 2018-07-14 RX ADMIN — LIDOCAINE HYDROCHLORIDE 8 ML: 10 INJECTION, SOLUTION EPIDURAL; INFILTRATION; INTRACAUDAL; PERINEURAL at 16:13

## 2018-07-14 RX ADMIN — DIPHENHYDRAMINE HYDROCHLORIDE 50 MG: 25 CAPSULE ORAL at 17:43

## 2018-07-14 RX ADMIN — Medication 10 ML: at 22:00

## 2018-07-14 RX ADMIN — FLUOXETINE 20 MG: 20 CAPSULE ORAL at 21:26

## 2018-07-14 RX ADMIN — ZOLPIDEM TARTRATE 5 MG: 5 TABLET ORAL at 21:38

## 2018-07-14 RX ADMIN — HYDROMORPHONE HYDROCHLORIDE 2 MG: 2 TABLET ORAL at 21:26

## 2018-07-14 RX ADMIN — SENNOSIDES AND DOCUSATE SODIUM 1 TABLET: 8.6; 5 TABLET ORAL at 10:12

## 2018-07-14 RX ADMIN — Medication 10 ML: at 06:38

## 2018-07-14 RX ADMIN — PREGABALIN 200 MG: 50 CAPSULE ORAL at 17:43

## 2018-07-14 RX ADMIN — PROMETHAZINE HYDROCHLORIDE 12.5 MG: 25 INJECTION INTRAMUSCULAR; INTRAVENOUS at 21:37

## 2018-07-14 RX ADMIN — LORAZEPAM 2 MG: 2 INJECTION INTRAMUSCULAR; INTRAVENOUS at 12:43

## 2018-07-14 RX ADMIN — ALBUMIN (HUMAN) 100 G: 12.5 INJECTION, SOLUTION INTRAVENOUS at 17:57

## 2018-07-14 RX ADMIN — POLYETHYLENE GLYCOL (3350) 17 G: 17 POWDER, FOR SOLUTION ORAL at 17:43

## 2018-07-14 RX ADMIN — HYDROMORPHONE HYDROCHLORIDE 2 MG: 2 TABLET ORAL at 06:37

## 2018-07-14 RX ADMIN — SENNOSIDES AND DOCUSATE SODIUM 1 TABLET: 8.6; 5 TABLET ORAL at 17:43

## 2018-07-14 RX ADMIN — Medication 10 ML: at 15:22

## 2018-07-14 RX ADMIN — PREGABALIN 200 MG: 50 CAPSULE ORAL at 10:12

## 2018-07-14 RX ADMIN — ANTICOAGULANT CITRATE DEXTROSE SOLUTION FORMULA A 1000 ML: 12.25; 11; 3.65 SOLUTION INTRAVENOUS at 18:00

## 2018-07-14 RX ADMIN — POLYETHYLENE GLYCOL (3350) 17 G: 17 POWDER, FOR SOLUTION ORAL at 10:12

## 2018-07-14 RX ADMIN — ENOXAPARIN SODIUM 40 MG: 40 INJECTION SUBCUTANEOUS at 17:43

## 2018-07-14 RX ADMIN — CALCIUM GLUCONATE 2 G: 94 INJECTION, SOLUTION INTRAVENOUS at 18:00

## 2018-07-14 RX ADMIN — HEPARIN SODIUM: 1000 INJECTION, SOLUTION INTRAVENOUS; SUBCUTANEOUS at 16:13

## 2018-07-14 RX ADMIN — HYDROMORPHONE HYDROCHLORIDE 2 MG: 2 TABLET ORAL at 10:55

## 2018-07-14 RX ADMIN — SODIUM CHLORIDE 2000 ML: 900 INJECTION, SOLUTION INTRAVENOUS at 17:59

## 2018-07-14 RX ADMIN — HYDROMORPHONE HYDROCHLORIDE 2 MG: 2 TABLET ORAL at 02:34

## 2018-07-14 RX ADMIN — HYDROMORPHONE HYDROCHLORIDE 2 MG: 2 TABLET ORAL at 17:43

## 2018-07-14 NOTE — CONSULTS
Marvin Ortega  YOB: 1990     Assessment & Plan:   1. NMO  - NEED FOR PLASMAPHERESIS   - CASE DISCUSSED WITH PT AND FAMILY AT LENGTH  - RISKS AND BENEFITS OF TEMPORARY DIALYSIS CATHETER DISCUSSED WITH PT AND SHE AGREES TO PROCEED  - RISKS AND BENEFITS OF PLASMAPHERESIS DISCUSSED WITH PT AND FAMILY AND SHE AGREES TO PROCEED  - CASE DISCUSSED WITH CRISPIN ADAMS  - PLASMAPHERESIS ORDER HAS BEEN PLACED  - TEMP DIALYSIS CATHETER ORDER HAS BEEN PLACED  - EVERY OTHER DAY TREATMENTS X 5 TREATMENTS PER NEUROLOGY                   Subjective:   CHIEF COMPLAINT: WEAKNESS  HPI:  MS. MILES IS A PLEASANT YET UNFORTUNATE 33 YO CF WITH A PMH SIGNIFICANT FOR SIMILAR EPISODE 7 YEARS AGO AT [facility] WITHOUT A DIAGNOSIS WHO PRESENTED WITH MULTIPLE NEURO SN/SX INCLUDING URINARY RETENTION, LLE WEAKNESS, CONSTIPATION (PAIN MEDS), PARATHESIAS. 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 9) 7/9/2018    Seizures (HCC)       Past Surgical History:   Procedure Laterality Date    ENDOSCOPY, COLON, DIAGNOSTIC      HX APPENDECTOMY      HX CHOLECYSTECTOMY      HX GI      HX GYN         Social History     Social History    Marital status: SINGLE     Spouse name: N/A    Number of children: N/A    Years of education: N/A     Occupational History    Not on file.      Social History Main Topics    Smoking status: Current Every Day Smoker    Smokeless tobacco: Never Used    Alcohol use Yes    Drug use: No    Sexual activity: Not Currently     Other Topics Concern    Not on file     Social History Narrative      Family History   Problem Relation Age of Onset    Hypertension Father     Diabetes Maternal Grandmother     Coronary Artery Disease Maternal Grandfather     Diabetes Maternal Grandfather     Diabetes Paternal Grandfather       Prior to Admission medications    Medication Sig Start Date End Date Taking? Authorizing Provider   predniSONE (DELTASONE) 20 mg tablet Take  by mouth See Admin Instructions. Take 3 tabs po for 3 days, then 2 tabs po for 3 days, then 1 tab for 3 days. Take until gone. 18 Yes Historical Provider   FLUoxetine (PROZAC) 20 mg capsule Take 20 mg by mouth nightly. Yes Phys Other, MD   etonogestrel (NEXPLANON) 68 mg impl by SubDERmal route once. Yes Historical Provider   tiZANidine (ZANAFLEX) 4 mg tablet Take 4 mg by mouth every six (6) hours as needed. 1-2 tabs QHS prn for back  pain    Yes Historical Provider     Allergies   Allergen Reactions    Codeine Other (comments)     Tachycardia    Sulfa (Sulfonamide Antibiotics) Itching    Bactrim [Sulfamethoprim Ds] Itching    Chocolate Flavor Other (comments)     Pt reports migraines    Clindamycin Other (comments)     Chest tightness    Compazine [Prochlorperazine Edisylate] Other (comments)     Neck and jaw spasm  And difficultly swallow     Morphine Hives    Peanut Other (comments)     migraines    Reglan [Metoclopramide] Nausea and Vomiting and Vertigo     Sleep disorder and shaking    Sulfur Swelling     Throat closes.  Zofran [Ondansetron Hcl (Pf)] Hives    Metaxalone Palpitations       Objective:     Vitals:  Blood pressure 110/72, pulse 70, temperature 98.3 °F (36.8 °C), resp. rate 16, height 5' 6\" (1.676 m), weight 99.3 kg (219 lb), SpO2 97 %, not currently breastfeeding.   Temp (24hrs), Av.5 °F (36.9 °C), Min:98.1 °F (36.7 °C), Max:99 °F (37.2 °C)      Intake and Output:      1901 -  0700  In: -   Out: 1300 [Urine:1300]    Physical Exam:                Patient is intubated:  NO    Physical Examination:   GENERAL ASSESSMENT: NAD  SKIN: DRY  HEAD: NC/AT  EYES: ANICTERIC  CHEST: CTAB  HEART: RRR  ABDOMEN: SOFT  :Mccrary: NO  EXTREMITY: NO EDEMA  NEURO: MDKX7 ECG/rhythm[de-identified] Rev: NO  Xray/CT/US/MRI REV:YES  Data Review No results found for this or any previous visit (from the past 67 hour(s)). Discussed with:    PT, FAMILY AND NURSING  Thank you so much to allow us to participate in this patient's care. We will follow.  : Dolly Lucas MD  7/14/2018      [facility]:  [email or web address]  Constance Soliman office:  [address] [address]  [address]  Phone: [phone]  Fax :     [phone]    [facility] office:  [address] [facility], [address] [place] [address]  Phone - [phone]  Fax - [phone]

## 2018-07-14 NOTE — PROGRESS NOTES
Gabino Higuera [facility] 
[address], [place], [address] [facility] 
[phone] Medical Progress Note NAME: Lamont Hanson :  1990 MRM:  [id number] Date/Time: 2018 Subjective: Chief Complaint:  F/u weakness/numbness Chart/notes/labs/studies reviewed, patient examined at bedside. Feels not much better. +weakness and numbness. Reports moderately severe back pain, only marginally improved after PO Dilaudid. No fevers. Objective:  
 
 
Vitals:  
 
 
  
Last 24hrs VS reviewed since prior progress note. Most recent are: 
 
Visit Vitals  /84 (BP 1 Location: Right arm, BP Patient Position: At rest)  Pulse (!) 112  Temp 98.2 °F (36.8 °C)  Resp 17  Ht 5' 6\" (1.676 m)  Wt 107.5 kg (237 lb)  SpO2 96%  Breastfeeding No  
 BMI 38.25 kg/m2 SpO2 Readings from Last 6 Encounters:  
18 96% 18 99% 18 96% 10/30/17 97% 10/21/16 98% 10/21/16 96% Intake/Output Summary (Last 24 hours) at 18 1534 Last data filed at 18 1530 Gross per 24 hour Intake                0 ml Output              850 ml Net             -850 ml Exam:  
 
Physical Exam: 
 
Gen:  Well-developed, well-nourished, in no acute distress HEENT:  Sclerae nonicteric, hearing intact to voice, mucous membranes moist 
Neck:  Supple, without masses. Resp:  No accessory muscle use, CTAB without wheezes, rales, or rhonchi 
Card: RRR, without m/r/g. No LE edema. Abd:  +bowel sounds, soft, NTTP, nondistended. Neuro: Face symmetric, tongue midline, speech fluent, follows commands appropriately. 5/5 strength BUEs. RLE strength in tact. LLE 3/5 strength distal. 4/5 proximal. Sensory deficit LLE Psych:  Alert, oriented x 3. Good insight. Tearful Medications Reviewed: (see below) Lab Data Reviewed: (see below) 
 
______________________________________________________________________ Medications:  
 
Current Facility-Administered Medications Medication Dose Route Frequency  pregabalin (LYRICA) capsule 200 mg  200 mg Oral BID  calcium gluconate 1 g in 0.9% sodium chloride 100 mL IVPB  1 g IntraVENous PRN  
 gadoterate meglumine (DOTAREM) 0.5 mmol/mL (376.9 mg/mL) contrast solution 20 mL  20 mL IntraVENous RAD ONCE  
 gadoterate meglumine (DOTAREM) 0.5 mmol/mL (376.9 mg/mL) contrast solution  LORazepam (ATIVAN) injection 2 mg  2 mg IntraVENous ONCE  
 albumin human 5% (BUMINATE) solution 100 g  2,000 mL IntraVENous EVERY OTHER DAY  calcium gluconate 2 g in 0.9% sodium chloride 100 mL IVPB  2 g IntraVENous EVERY OTHER DAY  
 0.9% sodium chloride infusion 2,000 mL  2,000 mL IntraVENous EVERY OTHER DAY  anticoagulant citrate dextrose (ACD-A) solution  1,000 mL In Vitro EVERY OTHER DAY  lidocaine (XYLOCAINE) 10 mg/mL (1 %) injection 20 mL  20 mL SubCUTAneous Multiple  heparin (porcine) 10,000 unit/mL injection 2,800 Units  2,800 Units Hemodialysis ONCE  
 heparin (porcine) 1,000 unit/mL injection  lidocaine (PF) (XYLOCAINE) 10 mg/mL (1 %) injection  diphenhydrAMINE (BENADRYL) capsule 50 mg  50 mg Oral Q6H PRN  
 sorbitol 70 % solution 30 mL  30 mL Oral DAILY PRN  
 zolpidem (AMBIEN) tablet 5 mg  5 mg Oral QHS PRN  
 senna-docusate (PERICOLACE) 8.6-50 mg per tablet 1 Tab  1 Tab Oral BID  zolpidem (AMBIEN) tablet 5 mg  5 mg Oral QHS  
 HYDROmorphone (DILAUDID) tablet 2 mg  2 mg Oral Q4H PRN  
 FLUoxetine (PROzac) capsule 20 mg  20 mg Oral QHS  sodium chloride (NS) flush 5-10 mL  5-10 mL IntraVENous Q8H  
 sodium chloride (NS) flush 5-10 mL  5-10 mL IntraVENous PRN  
 acetaminophen (TYLENOL) tablet 650 mg  650 mg Oral Q4H PRN  
 enoxaparin (LOVENOX) injection 40 mg  40 mg SubCUTAneous Q24H  polyethylene glycol (MIRALAX) packet 17 g  17 g Oral BID  
 Lab Review: No results for input(s): WBC, HGB, HCT, PLT, HGBEXT, HCTEXT, PLTEXT in the last 72 hours. No results for input(s): NA, K, CL, CO2, GLU, BUN, CREA, CA, MG, PHOS, ALB, TBIL, SGOT, ALT, INR in the last 72 hours. No lab exists for component: INREXT No components found for: Venancio [place] No results for input(s): PH, PCO2, PO2, HCO3, FIO2 in the last 72 hours. No results for input(s): INR in the last 72 hours. No lab exists for component: INREXT Lab Results Component Value Date/Time Specimen Description: URINE 07/11/2012 12:00 PM  
 Specimen Description: URINE 06/20/2012 01:58 AM  
 Specimen Description: CEREBROSPINAL FLUID 04/04/2012 03:20 PM  
 
Lab Results Component Value Date/Time Culture result: Culture performed on Unspun Fluid 07/09/2018 04:36 PM  
 Culture result: NO GROWTH ON SOLID MEDIA AT 4 DAYS 07/09/2018 04:36 PM  
 Culture result: NO GROWTH THUS FAR IN THIO BROTH, HOLDING 7 DAYS 07/09/2018 04:36 PM  
 
 
 
  
Assessment / Plan:  
 
  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 Discussed at length with Dr. Lexi Matthew. As pt already received high-dose IV steroids without improvement, neurology recommends plasmapheresis. Nephrology consulted. IR to place HD catheter. Neurology wishes to repeat MRI with contrast.  
 
  Left leg weakness / Numbness in left leg: as above Pain: common symptom in NMO. On high dose opioids already (2mg PO Dilaudid q4hour PRN). Agree with trial of Lyrica. Hoping to avoid IV opioids. Palliative care following Seizures ([facility] [address] (): no recent SZ. Not on AED. Follow up with neurology Nonintractable migraine: post-LP HA resolved after blood patch. Urinary incontinence: due to NMO. Purewick. No quinonez Bradycardia, sinus: TSH WNR. Monitor on tele. Obesity (BMI 30-39. 9): would benefit from weight loss and dietary / lifestyle modifications Constipation: cont bowel regimen. KUB unremarkable. Consider methyl altrexone if worse Total time spent: 45 minutes, d/w neuro Time spent in the care of this patient including reviewing records, discussing with nursing and/or other providers on the treatment team, obtaining history and examining the patient, and discussing treatment plans. Care Plan discussed with: Patient, Nursing Staff and >50% of time spent in counseling and coordination of care Discussed:  Care Plan Prophylaxis:  Lovenox Disposition:  SAH/Rehab 
        
___________________________________________________ Attending Physician: Eze Uribe MD

## 2018-07-14 NOTE — PROGRESS NOTES
During nursing leader rounds, patient expressed that she would like to have her pain medications changed from PO to IV now that her symptoms have a diagnosis. Palliative contacted (Dr. Elham Beasley) and pain medication will not be changed as patient is able to tolerate PO medications at this time.

## 2018-07-14 NOTE — PROGRESS NOTES
Bedside shift change report given to Keisha Gonzalez (oncoming nurse) by Geovanni Morgan (offgoing nurse).  Report included the following information SBAR, Kardex, ED Summary, Intake/Output, MAR, Recent Results and Cardiac Rhythm SR.

## 2018-07-14 NOTE — PROGRESS NOTES
Noted positive aquaporin-4 antibodies. ?NMO. Case discussed with neurologist Dr. Alex Sharif. She will discuss with pt to possibly repeat MR imaging. Dr. Alex Sharif asks that I consult nephrology for plasmapheresis.  She will meet with patient and family to discuss positive lab results and plan first.

## 2018-07-14 NOTE — PROCEDURES
[facility]   [phone]   Vitals Pre Post Assessment Pre Post   /77 126/70 LOC A&Ox4 A&Ox4    105 Lungs Clear&bronchovescicularx5, even&unlaboured. Clear&broncho  vescicularx5, even&unlabour  ed. Temp 98.4 98.5 Cardiac Sinus tachycardia Sinus tachycardia   Resp 20 20 Skin Warm & dry Warm & dry   Weight 237  lbs 237  lbs Edema None noted None noted   Height 5' 6\" 5' 6\" Pain denies denies     Plasmapheresis Orders   Product: 2L 5% Albumin IV+2L 0.9% NS IV                                            Volume: 4L                                               Duration: min Start: 1752 End: 1925 Total: >120  min     Fluids    Volume Processed: 9452ml   Plasma Removed: 4390ml   Replacement Fluid: 3626ml   Citrate: 230ml   NS: 100ml (solvent for Calcium Gluconate)     Access   Type & Location: RIJ catheter   Comments:  Newly placed-placement verified radiologically. Exhibits good flows upon aspiration; new dressing dry and intact and s drainage nor inflammation noted around biopatch. Post-tx all possible blood returned via full rinceback. Both catheter ports flushed and indwelled c 0.9% NS and capped and taped. Meds Given   Name Dose Route        Calcium Gluconate 2grams administered slow IVPB over tx course s difficulties               Labs   Obtained/Reviewed  Critical Results Called CBC, plts, retic count, NMO-Antibody  MD aware     Comments:  Pre-tx consents obtained/time-out performed. Tx progressed well and without incident; writer left pts room c pt in no new acute distress and denying complaints c stable vss WNL, bed in lowest position c side rails upx3, wheels lockedx4, and call bell within reach, parents at bedside. Reported off to [facility]. Gurpreet Tong RN.

## 2018-07-15 ENCOUNTER — APPOINTMENT (OUTPATIENT)
Dept: MRI IMAGING | Age: 28
DRG: 059 | End: 2018-07-15
Attending: PSYCHIATRY & NEUROLOGY
Payer: COMMERCIAL

## 2018-07-15 LAB
HBV SURFACE AG SER QL: <0.1 INDEX
HBV SURFACE AG SER QL: NEGATIVE
INR PPP: 1.2 (ref 0.9–1.1)
PROTHROMBIN TIME: 12.4 SEC (ref 9–11.1)

## 2018-07-15 PROCEDURE — 74011250637 HC RX REV CODE- 250/637: Performed by: INTERNAL MEDICINE

## 2018-07-15 PROCEDURE — A9575 INJ GADOTERATE MEGLUMI 0.1ML: HCPCS | Performed by: RADIOLOGY

## 2018-07-15 PROCEDURE — 36415 COLL VENOUS BLD VENIPUNCTURE: CPT | Performed by: STUDENT IN AN ORGANIZED HEALTH CARE EDUCATION/TRAINING PROGRAM

## 2018-07-15 PROCEDURE — 72157 MRI CHEST SPINE W/O & W/DYE: CPT

## 2018-07-15 PROCEDURE — 72156 MRI NECK SPINE W/O & W/DYE: CPT

## 2018-07-15 PROCEDURE — 74011250636 HC RX REV CODE- 250/636: Performed by: INTERNAL MEDICINE

## 2018-07-15 PROCEDURE — 65660000000 HC RM CCU STEPDOWN

## 2018-07-15 PROCEDURE — 74011250636 HC RX REV CODE- 250/636: Performed by: RADIOLOGY

## 2018-07-15 PROCEDURE — 87340 HEPATITIS B SURFACE AG IA: CPT | Performed by: INTERNAL MEDICINE

## 2018-07-15 PROCEDURE — 74011000258 HC RX REV CODE- 258: Performed by: INTERNAL MEDICINE

## 2018-07-15 PROCEDURE — 74011250637 HC RX REV CODE- 250/637: Performed by: PSYCHIATRY & NEUROLOGY

## 2018-07-15 PROCEDURE — 74011250637 HC RX REV CODE- 250/637: Performed by: FAMILY MEDICINE

## 2018-07-15 PROCEDURE — 70553 MRI BRAIN STEM W/O & W/DYE: CPT

## 2018-07-15 PROCEDURE — 85610 PROTHROMBIN TIME: CPT | Performed by: STUDENT IN AN ORGANIZED HEALTH CARE EDUCATION/TRAINING PROGRAM

## 2018-07-15 PROCEDURE — 77030038269 HC DRN EXT URIN PURWCK BARD -A

## 2018-07-15 RX ORDER — GADOTERATE MEGLUMINE 376.9 MG/ML
20 INJECTION INTRAVENOUS
Status: COMPLETED | OUTPATIENT
Start: 2018-07-15 | End: 2018-07-15

## 2018-07-15 RX ORDER — LORAZEPAM 2 MG/ML
4 INJECTION INTRAMUSCULAR
Status: COMPLETED | OUTPATIENT
Start: 2018-07-15 | End: 2018-07-15

## 2018-07-15 RX ADMIN — DIPHENHYDRAMINE HYDROCHLORIDE 50 MG: 25 CAPSULE ORAL at 20:03

## 2018-07-15 RX ADMIN — PREGABALIN 200 MG: 50 CAPSULE ORAL at 19:56

## 2018-07-15 RX ADMIN — HYDROMORPHONE HYDROCHLORIDE 2 MG: 2 TABLET ORAL at 08:59

## 2018-07-15 RX ADMIN — POLYETHYLENE GLYCOL (3350) 17 G: 17 POWDER, FOR SOLUTION ORAL at 08:59

## 2018-07-15 RX ADMIN — Medication 10 ML: at 06:00

## 2018-07-15 RX ADMIN — ZOLPIDEM TARTRATE 5 MG: 5 TABLET ORAL at 21:45

## 2018-07-15 RX ADMIN — LORAZEPAM 4 MG: 2 INJECTION INTRAMUSCULAR; INTRAVENOUS at 16:53

## 2018-07-15 RX ADMIN — GADOTERATE MEGLUMINE 20 ML: 376.9 INJECTION INTRAVENOUS at 18:05

## 2018-07-15 RX ADMIN — HYDROMORPHONE HYDROCHLORIDE 2 MG: 2 TABLET ORAL at 04:38

## 2018-07-15 RX ADMIN — PREGABALIN 200 MG: 50 CAPSULE ORAL at 08:59

## 2018-07-15 RX ADMIN — SENNOSIDES AND DOCUSATE SODIUM 1 TABLET: 8.6; 5 TABLET ORAL at 19:55

## 2018-07-15 RX ADMIN — HYDROMORPHONE HYDROCHLORIDE 2 MG: 2 TABLET ORAL at 13:25

## 2018-07-15 RX ADMIN — Medication 10 ML: at 21:47

## 2018-07-15 RX ADMIN — POLYETHYLENE GLYCOL (3350) 17 G: 17 POWDER, FOR SOLUTION ORAL at 19:55

## 2018-07-15 RX ADMIN — PROMETHAZINE HYDROCHLORIDE 12.5 MG: 25 INJECTION INTRAMUSCULAR; INTRAVENOUS at 22:37

## 2018-07-15 RX ADMIN — ENOXAPARIN SODIUM 40 MG: 40 INJECTION SUBCUTANEOUS at 19:56

## 2018-07-15 RX ADMIN — DIPHENHYDRAMINE HYDROCHLORIDE 50 MG: 25 CAPSULE ORAL at 13:25

## 2018-07-15 RX ADMIN — FLUOXETINE 20 MG: 20 CAPSULE ORAL at 21:45

## 2018-07-15 RX ADMIN — SORBITOL 30 ML: 258.2 SOLUTION ORAL at 09:24

## 2018-07-15 RX ADMIN — SENNOSIDES AND DOCUSATE SODIUM 1 TABLET: 8.6; 5 TABLET ORAL at 08:59

## 2018-07-15 RX ADMIN — HYDROMORPHONE HYDROCHLORIDE 2 MG: 2 TABLET ORAL at 19:56

## 2018-07-15 NOTE — PROGRESS NOTES
Neurology Progress Note    Interval Hx:      Follow up: Acute onset left leg weakness, severe lower back pain, right leg tingling/ paresthesias, urinary retention/incontinence, generalized hyperreflexia. Subjective:  Patient reports she is having pain at the site of a Justin catheter on the right side. She also has severe back pain. She has taken several doses of Lyrica but does not feel like it is not helpful. She is requesting IV pain medication. Patient's mom and father were at the bedside. Her mother is a nurse. They have a list of questions regarding the diagnosis and treatment. We reviewed this. Patient is status post 1 dose of plasma exchange. No significant benefit at this point. Lab review:  So far CSF has returned normal (WBC 4, Protein 38, Gram stain negative to date, HSV PCR negative), other than few hundred RBCs which is likely d/t traumatic tap. MS panel neg. CRP, CBC normal (other than mildly elevated Abs Lymphocytes 3.9). Other labs: ISABELLA, Copper, anti-ds DNA, urine heavy metals, lyme antibodies, SSA, SSB, were all negative.   NMO antibody was positive at >6        Current Facility-Administered Medications:     LORazepam (ATIVAN) injection 4 mg, 4 mg, IntraVENous, ONCE PRN, Obinna Navarrete MD    pregabalin (LYRICA) capsule 200 mg, 200 mg, Oral, BID, Rashid Escobedo MD, 200 mg at 07/15/18 0859    calcium gluconate 1 g in 0.9% sodium chloride 100 mL IVPB, 1 g, IntraVENous, PRN, Víctor Fairchild MD    albumin human 5% (BUMINATE) solution 100 g, 2,000 mL, IntraVENous, EVERY OTHER DAY, Víctor Fairchild MD, 100 g at 07/14/18 1757    calcium gluconate 2 g in 0.9% sodium chloride 100 mL IVPB, 2 g, IntraVENous, EVERY OTHER DAY, Víctor Fairchild MD, Last Rate: 120 mL/hr at 07/14/18 1800, 2 g at 07/14/18 1800    0.9% sodium chloride infusion 2,000 mL, 2,000 mL, IntraVENous, EVERY OTHER DAY, Víctor Fairchild MD, 2,000 mL at 07/14/18 1759    anticoagulant citrate dextrose (ACD-A) solution, 1,000 mL, In Vitro, EVERY OTHER DAY, Roxie Quintero MD, 1,000 mL at 07/14/18 1800    lidocaine (XYLOCAINE) 10 mg/mL (1 %) injection 20 mL, 20 mL, SubCUTAneous, Multiple, Mirta Flores MD    promethazine (PHENERGAN) 12.5 mg in 0.9% sodium chloride 50 mL IVPB, 12.5 mg, IntraVENous, Q4H PRN, Nickolas BEAULIEU Do, MD, Last Rate: 200 mL/hr at 07/14/18 2137, 12.5 mg at 07/14/18 2137    diphenhydrAMINE (BENADRYL) capsule 50 mg, 50 mg, Oral, Q6H PRN, Jenn Fairchild MD, 50 mg at 07/15/18 1325    sorbitol 70 % solution 30 mL, 30 mL, Oral, DAILY PRN, Beck Ramos MD, 30 mL at 07/15/18 0924    zolpidem (AMBIEN) tablet 5 mg, 5 mg, Oral, QHS PRN, Jenn Fairchild MD, 5 mg at 07/14/18 2138    senna-docusate (PERICOLACE) 8.6-50 mg per tablet 1 Tab, 1 Tab, Oral, BID, Jenn Fairchild MD, 1 Tab at 07/15/18 0859    zolpidem (AMBIEN) tablet 5 mg, 5 mg, Oral, QHS, Jenn Fairchild MD, 5 mg at 07/14/18 2138    HYDROmorphone (DILAUDID) tablet 2 mg, 2 mg, Oral, R9R PRN, Beck Ramos MD, 2 mg at 07/15/18 1325    FLUoxetine (PROzac) capsule 20 mg, 20 mg, Oral, QHS, Jenn Fairchild MD, 20 mg at 07/14/18 2126    sodium chloride (NS) flush 5-10 mL, 5-10 mL, IntraVENous, Q8H, Deborah Flores MD, 10 mL at 07/15/18 0600    sodium chloride (NS) flush 5-10 mL, 5-10 mL, IntraVENous, PRN, Deborah Flores MD, 10 mL at 07/09/18 1400    acetaminophen (TYLENOL) tablet 650 mg, 650 mg, Oral, Q4H PRN, Deborah Flores MD, 650 mg at 07/10/18 0658    enoxaparin (LOVENOX) injection 40 mg, 40 mg, SubCUTAneous, Q24H, Deborah Flores MD, 40 mg at 07/14/18 1743    polyethylene glycol (MIRALAX) packet 17 g, 17 g, Oral, BID, Deborah Flores MD, 17 g at 07/15/18 0859    Physical Exam    Patient Vitals for the past 12 hrs:   Temp Pulse Resp BP SpO2   07/15/18 1400 - (!) 141 - - -   07/15/18 1300 - (!) 127 18 (!) 168/93 97 %   07/15/18 0749 99 °F (37.2 °C) 89 18 138/77 95 %   07/15/18 0700 - 94 - - -       Awake, resting in bed    Focused Neurological Exam Extraocular movement intact bilaterally  Face appears symmetric  Hearing / Language intact to casual conversation    Sensory:   Left lower ext: absent pinprick/temp to level of T12  Right lower ext: intact LT, prick dec to mid thigh    Motor:   5/5 strength both arms and right leg  Left leg with at least 3/5 strength distally, 4/5 proximally in hip  Gait: not tested      Impression/ Plan    34 y.o. female with   Acute onset left leg weakness, severe lower back pain, right leg tingling/ paresthesias, urinary retention, generalized hyperreflexia    MRIs Brain, Cervical, Thoracic, Lumbar spine  (all without contrast) have not shown etiology (specifically no cord or CNS lesions, no spinal stenosis, no disc herniation). Given the new findings will repeat with contrast.  This is planned for today. Patient will require 4 mg of Ativan for this. LP with minimal RBCs (not suggestive of SAH) and no evidence of infection or inflammation (normal WBCs, Protein, Glucose, Gram stain, HSV PCR). MS Panel was negative. However NMO was positive. Pt has completed IV solumedrol so will now consult nephrology and initiate PLEX with a plan for 5 exchanges. She has had one exchange so far    Continue Lyrica 200 mg twice daily    Cont with palliative for pain management. Pt may need stronger treatment than oral meds are providing. However, this patient's primary and I are concerned by giving her IV opiates. Will defer to palliative regarding this. I do suspect patient significant neuropathic pain, but I am hopeful that the Lyrica will help with this. Cont aggressive bowel regimen as patient has not had a BM in a week. I suspect the pain medication could be contributing to this. Will monitor heart rate with remote telemetry as treatment is initiated. Reviewed the diagnoses and treatment plans with patient and family in detail at the bedside    Will follow closely with you.      Signed By: Miranda Rojas MD     July 15, 2018      Over 35 minutes was spent reviewing records, discussing with family and nursing, obtaining history, examining patient and discussing treatment plans.

## 2018-07-15 NOTE — ROUTINE PROCESS
Pt reports nausea, called physician, phenergan was ordered. Will administer and continue to monitor. Notified physician of HR in 120s.

## 2018-07-15 NOTE — PROGRESS NOTES
Bedside and Verbal shift change report given to [address] [facility] (oncoming nurse) by Elsa Hernandez RN (offgoing nurse). Report included the following information SBAR, Kardex, Procedure Summary, Intake/Output, MAR, Accordion and Recent Results.

## 2018-07-15 NOTE — ROUTINE PROCESS
Bedside shift change report given to Rebecca Fuentes (oncoming nurse) by Rosealee Canavan (offgoing nurse). Report included the following information SBAR, Kardex, Procedure Summary, Intake/Output, MAR, Accordion, Recent Results and Med Rec Status.

## 2018-07-15 NOTE — PROGRESS NOTES
[facility] [address] 
[address] [place] [address], [address] [place] [address], [address] [place] [address] 
[phone] Medical Progress Note NAME: Narinder Albert :  1990 MRM:  [id number] Date/Time: 7/15/2018 Subjective: Chief Complaint:  F/u weakness/numbness Chart/notes/labs/studies reviewed, patient examined at bedside. Not much improvement after 1st session of plasmapheresis. No fevers. Still weak and numb. Reports R-sided neck pain, where HD catheter is placed Objective:  
 
 
Vitals:  
 
 
  
Last 24hrs VS reviewed since prior progress note. Most recent are: 
 
Visit Vitals  /77 (BP 1 Location: Right arm, BP Patient Position: At rest)  Pulse 89  Temp 99 °F (37.2 °C)  Resp 18  Ht 5' 6\" (1.676 m)  Wt 107.5 kg (237 lb)  SpO2 95%  Breastfeeding No  
 BMI 38.25 kg/m2 SpO2 Readings from Last 6 Encounters:  
07/15/18 95% 18 99% 18 96% 10/30/17 97% 10/21/16 98% 10/21/16 96% Intake/Output Summary (Last 24 hours) at 07/15/18 1203 Last data filed at 07/15/18 3528 Gross per 24 hour Intake              550 ml Output             5340 ml Net            -4790 ml Exam:  
 
Physical Exam: 
 
Gen:  Well-developed, well-nourished, in no acute distress HEENT:  Sclerae nonicteric, hearing intact to voice, mucous membranes moist 
Neck:  Supple, without masses. R IJ HD catheter in place. Dressing C/D/I Resp:  No accessory muscle use, CTAB without wheezes, rales, or rhonchi 
Card: RRR, without m/r/g. No LE edema. Abd:  +bowel sounds, soft, NTTP, nondistended. Neuro: Face symmetric, tongue midline, speech fluent, follows commands appropriately. 5/5 strength BUEs. RLE strength in tact. LLE 3/5 strength distal. 4/5 proximal. Sensory deficit LLE Psych:  Alert, oriented x 3. Good insight. Tearful Medications Reviewed: (see below) Lab Data Reviewed: (see below) 
 
______________________________________________________________________ Medications:  
 
Current Facility-Administered Medications Medication Dose Route Frequency  LORazepam (ATIVAN) injection 4 mg  4 mg IntraVENous ONCE PRN  pregabalin (LYRICA) capsule 200 mg  200 mg Oral BID  calcium gluconate 1 g in 0.9% sodium chloride 100 mL IVPB  1 g IntraVENous PRN  
 albumin human 5% (BUMINATE) solution 100 g  2,000 mL IntraVENous EVERY OTHER DAY  calcium gluconate 2 g in 0.9% sodium chloride 100 mL IVPB  2 g IntraVENous EVERY OTHER DAY  
 0.9% sodium chloride infusion 2,000 mL  2,000 mL IntraVENous EVERY OTHER DAY  anticoagulant citrate dextrose (ACD-A) solution  1,000 mL In Vitro EVERY OTHER DAY  lidocaine (XYLOCAINE) 10 mg/mL (1 %) injection 20 mL  20 mL SubCUTAneous Multiple  promethazine (PHENERGAN) 12.5 mg in 0.9% sodium chloride 50 mL IVPB  12.5 mg IntraVENous Q4H PRN  
 diphenhydrAMINE (BENADRYL) capsule 50 mg  50 mg Oral Q6H PRN  
 sorbitol 70 % solution 30 mL  30 mL Oral DAILY PRN  
 zolpidem (AMBIEN) tablet 5 mg  5 mg Oral QHS PRN  
 senna-docusate (PERICOLACE) 8.6-50 mg per tablet 1 Tab  1 Tab Oral BID  zolpidem (AMBIEN) tablet 5 mg  5 mg Oral QHS  
 HYDROmorphone (DILAUDID) tablet 2 mg  2 mg Oral Q4H PRN  
 FLUoxetine (PROzac) capsule 20 mg  20 mg Oral QHS  sodium chloride (NS) flush 5-10 mL  5-10 mL IntraVENous Q8H  
 sodium chloride (NS) flush 5-10 mL  5-10 mL IntraVENous PRN  
 acetaminophen (TYLENOL) tablet 650 mg  650 mg Oral Q4H PRN  
 enoxaparin (LOVENOX) injection 40 mg  40 mg SubCUTAneous Q24H  polyethylene glycol (MIRALAX) packet 17 g  17 g Oral BID Lab Review: No results for input(s): WBC, HGB, HCT, PLT, HGBEXT, HCTEXT, PLTEXT, HGBEXT, HCTEXT, PLTEXT in the last 72 hours. Recent Labs  
   07/15/18 
 0439  07/14/18 
 1700 INR  1.2*  1.0 No components found for: Venancio Point No results for input(s): PH, PCO2, PO2, HCO3, FIO2 in the last 72 hours. Recent Labs  
   07/15/18 
 0439  07/14/18 
 1700 INR  1.2*  1.0 Lab Results Component Value Date/Time Specimen Description: URINE 07/11/2012 12:00 PM  
 Specimen Description: URINE 06/20/2012 01:58 AM  
 Specimen Description: CEREBROSPINAL FLUID 04/04/2012 03:20 PM  
 
Lab Results Component Value Date/Time Culture result: Culture performed on Unspun Fluid 07/09/2018 04:36 PM  
 Culture result: NO GROWTH ON SOLID MEDIA AT 4 DAYS 07/09/2018 04:36 PM  
 Culture result: NO GROWTH THUS FAR IN THIO BROTH, HOLDING 7 DAYS 07/09/2018 04:36 PM  
 
 
 
  
Assessment / Plan:  
 
  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 Case was discussed at length with Dr. Starr Peoples. As pt already received high-dose IV steroids without improvement, pt was started on plasmapheresis. Planning 5 sessions, every other day (started on 7/14). MRI with contrast ordered Left leg weakness / Numbness in left leg: mgmt as above. Continue PT/OT Pain: common symptom in NMO. On high dose opioids already (2mg PO Dilaudid q4hour PRN). Agree with trial of Lyrica. Would avoid IV opioids if possible. Palliative care following Seizures ([facility] [address] (): no recent SZ. Not on AED. Follow up with neurology Nonintractable migraine: post-LP HA resolved after blood patch. Urinary incontinence: due to NMO. Purewick. No quinonez Bradycardia, sinus: TSH WNR. Monitor on tele. Obesity (BMI 30-39. 9): would benefit from weight loss and dietary / lifestyle modifications Constipation: cont bowel regimen. KUB unremarkable. Consider methyl altrexone if worse Total time spent: 25 minutes Time spent in the care of this patient including reviewing records, discussing with nursing and/or other providers on the treatment team, obtaining history and examining the patient, and discussing treatment plans. Care Plan discussed with: Patient, Nursing Staff and >50% of time spent in counseling and coordination of care Discussed:  Care Plan Prophylaxis:  Lovenox Disposition:  SAH/Rehab 
        
___________________________________________________ Attending Physician: Sergio Coley MD

## 2018-07-16 LAB
ALBUMIN SERPL-MCNC: 2.9 G/DL (ref 3.5–5)
ALBUMIN/GLOB SERPL: 1.7 {RATIO} (ref 1.1–2.2)
ALP SERPL-CCNC: 38 U/L (ref 45–117)
ALT SERPL-CCNC: 40 U/L (ref 12–78)
ANION GAP SERPL CALC-SCNC: 5 MMOL/L (ref 5–15)
AST SERPL-CCNC: 16 U/L (ref 15–37)
BACTERIA SPEC CULT: NORMAL
BASOPHILS # BLD: 0 K/UL (ref 0–0.1)
BASOPHILS NFR BLD: 0 % (ref 0–1)
BILIRUB SERPL-MCNC: 0.6 MG/DL (ref 0.2–1)
BUN SERPL-MCNC: 11 MG/DL (ref 6–20)
BUN/CREAT SERPL: 13 (ref 12–20)
CALCIUM SERPL-MCNC: 7.7 MG/DL (ref 8.5–10.1)
CHLORIDE SERPL-SCNC: 104 MMOL/L (ref 97–108)
CO2 SERPL-SCNC: 30 MMOL/L (ref 21–32)
CREAT SERPL-MCNC: 0.83 MG/DL (ref 0.55–1.02)
DIFFERENTIAL METHOD BLD: ABNORMAL
EOSINOPHIL # BLD: 0.5 K/UL (ref 0–0.4)
EOSINOPHIL NFR BLD: 3 % (ref 0–7)
ERYTHROCYTE [DISTWIDTH] IN BLOOD BY AUTOMATED COUNT: 13 % (ref 11.5–14.5)
GLOBULIN SER CALC-MCNC: 1.7 G/DL (ref 2–4)
GLUCOSE SERPL-MCNC: 83 MG/DL (ref 65–100)
GRAM STN SPEC: NORMAL
HCT VFR BLD AUTO: 39.9 % (ref 35–47)
HGB BLD-MCNC: 13.1 G/DL (ref 11.5–16)
IMM GRANULOCYTES # BLD: 0 K/UL
IMM GRANULOCYTES NFR BLD AUTO: 0 %
INR PPP: 1.1 (ref 0.9–1.1)
LYMPHOCYTES # BLD: 7.1 K/UL (ref 0.8–3.5)
LYMPHOCYTES NFR BLD: 42 % (ref 12–49)
MAGNESIUM SERPL-MCNC: 2.2 MG/DL (ref 1.6–2.4)
MCH RBC QN AUTO: 31.7 PG (ref 26–34)
MCHC RBC AUTO-ENTMCNC: 32.8 G/DL (ref 30–36.5)
MCV RBC AUTO: 96.6 FL (ref 80–99)
MONOCYTES # BLD: 1 K/UL (ref 0–1)
MONOCYTES NFR BLD: 6 % (ref 5–13)
NEUTS SEG # BLD: 8.4 K/UL (ref 1.8–8)
NEUTS SEG NFR BLD: 49 % (ref 32–75)
NRBC # BLD: 0 K/UL (ref 0–0.01)
NRBC BLD-RTO: 0 PER 100 WBC
PHOSPHATE SERPL-MCNC: 3.5 MG/DL (ref 2.6–4.7)
PLATELET # BLD AUTO: 127 K/UL (ref 150–400)
PMV BLD AUTO: 10.2 FL (ref 8.9–12.9)
POTASSIUM SERPL-SCNC: 4.5 MMOL/L (ref 3.5–5.1)
PROT SERPL-MCNC: 4.6 G/DL (ref 6.4–8.2)
PROTHROMBIN TIME: 10.6 SEC (ref 9–11.1)
RBC # BLD AUTO: 4.13 M/UL (ref 3.8–5.2)
RBC MORPH BLD: ABNORMAL
SERVICE CMNT-IMP: NORMAL
SODIUM SERPL-SCNC: 139 MMOL/L (ref 136–145)
WBC # BLD AUTO: 17 K/UL (ref 3.6–11)

## 2018-07-16 PROCEDURE — 74011250636 HC RX REV CODE- 250/636: Performed by: INTERNAL MEDICINE

## 2018-07-16 PROCEDURE — 74011250637 HC RX REV CODE- 250/637: Performed by: FAMILY MEDICINE

## 2018-07-16 PROCEDURE — 36514 APHERESIS PLASMA: CPT

## 2018-07-16 PROCEDURE — 65660000000 HC RM CCU STEPDOWN

## 2018-07-16 PROCEDURE — 36415 COLL VENOUS BLD VENIPUNCTURE: CPT | Performed by: INTERNAL MEDICINE

## 2018-07-16 PROCEDURE — 84100 ASSAY OF PHOSPHORUS: CPT | Performed by: INTERNAL MEDICINE

## 2018-07-16 PROCEDURE — 97116 GAIT TRAINING THERAPY: CPT

## 2018-07-16 PROCEDURE — 85610 PROTHROMBIN TIME: CPT | Performed by: INTERNAL MEDICINE

## 2018-07-16 PROCEDURE — 97530 THERAPEUTIC ACTIVITIES: CPT

## 2018-07-16 PROCEDURE — 83735 ASSAY OF MAGNESIUM: CPT | Performed by: INTERNAL MEDICINE

## 2018-07-16 PROCEDURE — 74011250637 HC RX REV CODE- 250/637: Performed by: INTERNAL MEDICINE

## 2018-07-16 PROCEDURE — P9045 ALBUMIN (HUMAN), 5%, 250 ML: HCPCS | Performed by: INTERNAL MEDICINE

## 2018-07-16 PROCEDURE — 80053 COMPREHEN METABOLIC PANEL: CPT | Performed by: INTERNAL MEDICINE

## 2018-07-16 PROCEDURE — 74011250637 HC RX REV CODE- 250/637: Performed by: PSYCHIATRY & NEUROLOGY

## 2018-07-16 PROCEDURE — 77030038269 HC DRN EXT URIN PURWCK BARD -A

## 2018-07-16 PROCEDURE — 74011000258 HC RX REV CODE- 258: Performed by: INTERNAL MEDICINE

## 2018-07-16 PROCEDURE — 74011250636 HC RX REV CODE- 250/636: Performed by: FAMILY MEDICINE

## 2018-07-16 PROCEDURE — 85025 COMPLETE CBC W/AUTO DIFF WBC: CPT | Performed by: INTERNAL MEDICINE

## 2018-07-16 PROCEDURE — 74011000250 HC RX REV CODE- 250: Performed by: INTERNAL MEDICINE

## 2018-07-16 RX ORDER — HYDROMORPHONE HYDROCHLORIDE 2 MG/1
2 TABLET ORAL
Status: DISCONTINUED | OUTPATIENT
Start: 2018-07-16 | End: 2018-07-25 | Stop reason: HOSPADM

## 2018-07-16 RX ORDER — HYDROMORPHONE HYDROCHLORIDE 2 MG/ML
1 INJECTION, SOLUTION INTRAMUSCULAR; INTRAVENOUS; SUBCUTANEOUS
Status: DISCONTINUED | OUTPATIENT
Start: 2018-07-16 | End: 2018-07-25 | Stop reason: HOSPADM

## 2018-07-16 RX ORDER — PREGABALIN 100 MG/1
200 CAPSULE ORAL 3 TIMES DAILY
Status: DISCONTINUED | OUTPATIENT
Start: 2018-07-16 | End: 2018-07-25 | Stop reason: HOSPADM

## 2018-07-16 RX ADMIN — Medication 10 ML: at 21:25

## 2018-07-16 RX ADMIN — ENOXAPARIN SODIUM 40 MG: 40 INJECTION SUBCUTANEOUS at 18:19

## 2018-07-16 RX ADMIN — SENNOSIDES AND DOCUSATE SODIUM 1 TABLET: 8.6; 5 TABLET ORAL at 18:19

## 2018-07-16 RX ADMIN — HYDROMORPHONE HYDROCHLORIDE 1 MG: 2 INJECTION, SOLUTION INTRAMUSCULAR; INTRAVENOUS; SUBCUTANEOUS at 20:25

## 2018-07-16 RX ADMIN — POLYETHYLENE GLYCOL (3350) 17 G: 17 POWDER, FOR SOLUTION ORAL at 18:19

## 2018-07-16 RX ADMIN — HYDROMORPHONE HYDROCHLORIDE 2 MG: 2 TABLET ORAL at 13:36

## 2018-07-16 RX ADMIN — SENNOSIDES AND DOCUSATE SODIUM 1 TABLET: 8.6; 5 TABLET ORAL at 08:54

## 2018-07-16 RX ADMIN — ALBUMIN (HUMAN) 100 G: 12.5 INJECTION, SOLUTION INTRAVENOUS at 14:38

## 2018-07-16 RX ADMIN — PROMETHAZINE HYDROCHLORIDE 12.5 MG: 25 INJECTION INTRAMUSCULAR; INTRAVENOUS at 05:58

## 2018-07-16 RX ADMIN — Medication 10 ML: at 14:00

## 2018-07-16 RX ADMIN — DIPHENHYDRAMINE HYDROCHLORIDE 50 MG: 25 CAPSULE ORAL at 06:02

## 2018-07-16 RX ADMIN — FLUOXETINE 20 MG: 20 CAPSULE ORAL at 21:25

## 2018-07-16 RX ADMIN — DIPHENHYDRAMINE HYDROCHLORIDE 50 MG: 25 CAPSULE ORAL at 13:46

## 2018-07-16 RX ADMIN — ZOLPIDEM TARTRATE 5 MG: 5 TABLET ORAL at 21:25

## 2018-07-16 RX ADMIN — SODIUM CHLORIDE 2000 ML: 900 INJECTION, SOLUTION INTRAVENOUS at 14:39

## 2018-07-16 RX ADMIN — CALCIUM GLUCONATE 2 G: 94 INJECTION, SOLUTION INTRAVENOUS at 14:38

## 2018-07-16 RX ADMIN — HYDROMORPHONE HYDROCHLORIDE 2 MG: 2 TABLET ORAL at 05:40

## 2018-07-16 RX ADMIN — PREGABALIN 200 MG: 50 CAPSULE ORAL at 08:54

## 2018-07-16 RX ADMIN — POLYETHYLENE GLYCOL (3350) 17 G: 17 POWDER, FOR SOLUTION ORAL at 08:54

## 2018-07-16 RX ADMIN — PROMETHAZINE HYDROCHLORIDE 12.5 MG: 25 INJECTION INTRAMUSCULAR; INTRAVENOUS at 14:38

## 2018-07-16 RX ADMIN — HYDROMORPHONE HYDROCHLORIDE 2 MG: 2 TABLET ORAL at 00:38

## 2018-07-16 RX ADMIN — ANTICOAGULANT CITRATE DEXTROSE SOLUTION FORMULA A 1000 ML: 12.25; 11; 3.65 SOLUTION INTRAVENOUS at 15:26

## 2018-07-16 RX ADMIN — Medication 10 ML: at 06:00

## 2018-07-16 RX ADMIN — SORBITOL 30 ML: 258.2 SOLUTION ORAL at 10:07

## 2018-07-16 RX ADMIN — PREGABALIN 200 MG: 100 CAPSULE ORAL at 21:25

## 2018-07-16 RX ADMIN — HYDROMORPHONE HYDROCHLORIDE 2 MG: 2 TABLET ORAL at 08:54

## 2018-07-16 NOTE — PROGRESS NOTES
Bedside and Verbal shift change report given to [address]  (oncoming nurse) by Junie Kahn RN (offgoing nurse). Report included the following information SBAR, Kardex, Procedure Summary, Intake/Output, MAR, Accordion and Recent Results.

## 2018-07-16 NOTE — PROGRESS NOTES
NUTRITION   RD Screen      Diet: Regular  Skin: intact   PO Intake: Patient Vitals for the past 72 hrs:   % Diet Eaten   07/16/18 1221 100 %   07/16/18 0720 100 %       Estimated Daily Nutrition Requirements:  Kcals:  1913 Kcals/day (BMR(1857x1.3) -500)              Protein: 88 g (-110 g/day(0.8-1.0g/kg))             Fluid:   1900 mL    Pt screened for nutrition risk d/t LOS. Pt weight stable over last ~2 yrs. Currently undergoing PLEX for newly diagnosed Neuromyelitis Optica (NMO). Pt will have 5 rounds of PLEX total. Pt eating well. No n/v. Last BM 7/15. No nutritional concerns at this time. Wt Readings from Last 30 Encounters:   07/15/18 110 kg (242 lb 6.4 oz)   04/27/18 113.9 kg (251 lb)   04/27/18 112 kg (246 lb 14.6 oz)   10/30/17 103.6 kg (228 lb 6.4 oz)   10/21/16 106.7 kg (235 lb 3.7 oz)   10/20/16 106.1 kg (234 lb)   09/26/16 93 kg (205 lb)   04/21/15 77.1 kg (170 lb)   02/28/13 77.6 kg (171 lb)   11/13/12 87.1 kg (192 lb)   08/22/12 86.7 kg (191 lb 3.2 oz)   08/17/12 74.8 kg (165 lb)   07/11/12 78.5 kg (173 lb)   06/27/12 74.8 kg (165 lb)   06/20/12 73.5 kg (162 lb)   05/30/12 78 kg (172 lb)   05/17/12 78 kg (172 lb)   04/07/12 77.6 kg (171 lb)   04/06/12 77.2 kg (170 lb 3.2 oz)   04/04/12 69.4 kg (153 lb)   01/05/12 77.6 kg (171 lb) (92 %, Z= 1.41)*   10/06/11 74.8 kg (165 lb) (90 %, Z= 1.28)*   09/29/11 74.8 kg (165 lb) (90 %, Z= 1.28)*   07/18/11 74.8 kg (165 lb) (90 %, Z= 1.29)*   04/25/11 75.8 kg (167 lb) (91 %, Z= 1.36)*     * Growth percentiles are based on [facility] 2-20 Years data.             Problem:  No nutritional diagnosis at this time     Etiology: related to       Signs/Symptoms: as evidenced by        RD will continue to monitor and will f/u for further nutrition assessment and intervention as appropriate    Education & Discharge Needs:   [] Nutrition related discharge needs addressed:    [] Supplements (on d/c instruction &/or coupons provided)    [] Education    [x] No nutrition related discharge needs at this time     Cultural, [employer] and ethnic food preferences identified    [x]None   [] Yes     Issa Ulloa, [address]

## 2018-07-16 NOTE — PROGRESS NOTES
Problem: Mobility Impaired (Adult and Pediatric)  Goal: *Acute Goals and Plan of Care (Insert Text)  Physical Therapy Goals  Initiated 7/10/2018  1. Patient will move from supine to sit and sit to supine  in bed with modified independence within 7 day(s). 2.  Patient will transfer from bed to chair and chair to bed with modified independence using the least restrictive device within 7 day(s). 3.  Patient will perform sit to stand with supervision/set-up within 7 day(s). 4.  Patient will ambulate with minimal assistance/contact guard assist for 150 feet with the least restrictive device within 7 day(s). 5.  Patient will ascend/descend 4 stairs with 2 handrail(s) with minimal assistance/contact guard assist within 7 day(s). physical Therapy TREATMENT  Patient: Crys Mendez (21 y.o. 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 ASSESSMENT:  Pt seen this AM.Pt comes to sit with CGA. Pt CGA sit to stand. Pt ambulated 80ft with RW CGA. Pt moves slowly with back pain and fatigues quickly with mobility. Pt back to bed with CGA. Pt progressing slowly. Continue goals. Progression toward goals:  []    Improving appropriately and progressing toward goals  []    Improving slowly and progressing toward goals  []    Not making progress toward goals and plan of care will be adjusted     PLAN:  Patient continues to benefit from skilled intervention to address the above impairments. Continue treatment per established plan of care.   Discharge Recommendations:  Rehab  Further Equipment Recommendations for Discharge:  rolling walker     SUBJECTIVE:       OBJECTIVE DATA SUMMARY:   Critical Behavior:  Neurologic State: Alert  Orientation Level: Oriented X4  Cognition: Appropriate for age attention/concentration  Safety/Judgement: Awareness of environment, Fall prevention, Insight into deficits  Functional Mobility Training:  Bed Mobility:     Supine to Sit: Contact guard assistance              Transfers:  Sit to Stand: Contact guard assistance  Stand to Sit: Contact guard assistance                             Balance:  Sitting: Intact  Standing: With support  Ambulation/Gait Training:  Distance (ft): 80 Feet (ft)  Assistive Device: Gait belt;Walker, rolling  Ambulation - Level of Assistance: Contact guard assistance                 Base of Support: Narrowed  Stance: Left increased       Pain:                    Activity Tolerance:   Pt tolerated treatment fairly well. Please refer to the flowsheet for vital signs taken during this treatment.   After treatment:   []    Patient left in no apparent distress sitting up in chair  [x]    Patient left in no apparent distress in bed  []    Call bell left within reach  []    Nursing notified  []    Caregiver present  []    Bed alarm activated    COMMUNICATION/COLLABORATION:   The patients plan of care was discussed with: Physical Therapist    Milton Alarcon PTA   Time Calculation: 23 mins

## 2018-07-16 NOTE — ROUTINE PROCESS
Bedside shift change report given to Peyton Wilkins (oncoming nurse) by Demetria Severe (offgoing nurse). Report included the following information SBAR, Kardex, ED Summary, Intake/Output, MAR, Accordion, Recent Results and Med Rec Status.

## 2018-07-16 NOTE — PROGRESS NOTES
Gabino Friend [place] [address] YOB: 1990 Assessment & Plan:  
NMO · Pheresis every other day x 5 · Treatment #2 today · Had nausea with 1st tx. Tx with phenergan. · Follow fibrinogen starting tomorrow. Subjective:  
CC: F/u NMO 
HPI: For pheresis today ROS: some nausea with last tx Current Facility-Administered Medications Medication Dose Route Frequency  pregabalin (LYRICA) capsule 200 mg  200 mg Oral TID  calcium gluconate 1 g in 0.9% sodium chloride 100 mL IVPB  1 g IntraVENous PRN  
 albumin human 5% (BUMINATE) solution 100 g  2,000 mL IntraVENous EVERY OTHER DAY  calcium gluconate 2 g in 0.9% sodium chloride 100 mL IVPB  2 g IntraVENous EVERY OTHER DAY  
 0.9% sodium chloride infusion 2,000 mL  2,000 mL IntraVENous EVERY OTHER DAY  anticoagulant citrate dextrose (ACD-A) solution  1,000 mL In Vitro EVERY OTHER DAY  lidocaine (XYLOCAINE) 10 mg/mL (1 %) injection 20 mL  20 mL SubCUTAneous Multiple  promethazine (PHENERGAN) 12.5 mg in 0.9% sodium chloride 50 mL IVPB  12.5 mg IntraVENous Q4H PRN  
 diphenhydrAMINE (BENADRYL) capsule 50 mg  50 mg Oral Q6H PRN  
 sorbitol 70 % solution 30 mL  30 mL Oral DAILY PRN  
 zolpidem (AMBIEN) tablet 5 mg  5 mg Oral QHS PRN  
 senna-docusate (PERICOLACE) 8.6-50 mg per tablet 1 Tab  1 Tab Oral BID  zolpidem (AMBIEN) tablet 5 mg  5 mg Oral QHS  
 HYDROmorphone (DILAUDID) tablet 2 mg  2 mg Oral Q4H PRN  
 FLUoxetine (PROzac) capsule 20 mg  20 mg Oral QHS  sodium chloride (NS) flush 5-10 mL  5-10 mL IntraVENous Q8H  
 sodium chloride (NS) flush 5-10 mL  5-10 mL IntraVENous PRN  
 acetaminophen (TYLENOL) tablet 650 mg  650 mg Oral Q4H PRN  
 enoxaparin (LOVENOX) injection 40 mg  40 mg SubCUTAneous Q24H  polyethylene glycol (MIRALAX) packet 17 g  17 g Oral BID Objective:  
 
Vitals: 
Blood pressure 128/85, pulse (!) 116, temperature 98 °F (36.7 °C), resp. rate 18, height 5' 6\" (1.676 m), weight 110 kg (242 lb 6.4 oz), SpO2 96 %, not currently breastfeeding. Temp (24hrs), Av.6 °F (37 °C), Min:98 °F (36.7 °C), Max:99.2 °F (37.3 °C) Intake and Output: 
701 -  1900 In: -  
Out: 1000 [Urine:1000] 1901 -  0700 In: 550 [I.V.:550] Out: 1577 [FIJHL:154] Physical Exam:               
GENERAL ASSESSMENT: NAD 
CHEST: CTA HEART: S1S2 EXTREMITY: no EDEMA 
 
 
   
ECG/rhythm: 
 
Data Review No results for input(s): TNIPOC in the last 72 hours. No lab exists for component: ITNL No results for input(s): CPK, CKMB, TROIQ in the last 72 hours. Recent Labs  
   18 
 9375 NA  139  
K  4.5  
CL  104 CO2  30 BUN  11  
CREA  0.83 GLU  83 PHOS  3.5 MG  2.2 CA  7.7* ALB  2.9* WBC  17.0*  
HGB  13.1 HCT  39.9 PLT  127* Recent Labs  
   18 
 0527  07/15/18 
 0439  18 
 1700 INR  1.1  1.2*  1.0 PTP  10.6  12.4*  10.2 Needs: urine analysis, urine sodium, protein and creatinine No results found for: RACHEL THAKKAR 
 
 
 
: Silvino Jean MD 
2018 [facility]: 
[email or web address] [facility] office: 
[address] [place] [address], [address] [place], [address] [facility] Phone: [phone] Fax :     [phone] [place] office: 
[address] [facility], [address] Phone - [phone] Fax - [phone]

## 2018-07-16 NOTE — PROCEDURES
[facility]   [phone]   Vitals Pre Post Assessment Pre Post   /87 110/90 LOC A&Ox4 A&Ox4    110 Lungs Clear&bronchovescicularx5, even&unlaboured. Clear&broncho  vescicularx5, even&unlabour  ed. Temp 97.8 97.6 Cardiac Sinus tachycardia Sinus tachycardia   Resp 20 18 Skin Warm & dry Warm & dry   Weight 237  lbs 237  lbs Edema None noted None noted   Height 5' 6\" 5' 6\" Pain denies denies     Plasmapheresis Orders   Product: 2L 5% Albumin IV+2L 0.9% NS IV                                           Volume: 4L                                               Duration: min Start: 1425 End: 1540 Total: >110  min     Fluids    Volume Processed: 7630ml   Plasma Removed: 3838ml   Replacement Fluid: 3835ml   Citrate: 111ml   NS: 120ml (Calcium Gluconate)     Access   Type & Location: RIJ catheter   Comments: Exhibits acceptable flows upon aspiration; dressing changed per protocol: new dressing dry and intact and s drainage nor inflammation noted around biopatch. Post-tx all possible blood returned via full rinceback. Both catheter ports flushed and indwelled c 0.9% NS and capped and taped. Meds Given   Name Dose Route        Calcium Gluconate 2grams administered slow IVPB over tx course s difficulties               Labs   Obtained/Reviewed  Critical Results Called CBC, pltst, NMO-antibody  MD aware     Comments:   Pre-tx consents verified/time-out performed. Tx progressed well and without incident; writer left pts room c pt in no new acute distress and denying complaints c stable BP, bed in lowest position c side rails upx3, wheels lockedx4, and call bell within reach. Reported off to [facility]. Shelby Godoy RN.

## 2018-07-16 NOTE — PROGRESS NOTES
Gabino Higuera [facility] 
[address] [facility], [address] [place] [address] 
[phone] Medical Progress Note NAME: Jessica Poole :  1990 MRM:  [id number] Date/Time: 2018 Subjective: Chief Complaint:  F/u weakness/numbness Chart/notes/labs/studies reviewed, patient examined at bedside. No improvement in strength or numbness. Very focused on pain control. Reports 10/10 pain. No f/c. Objective:  
 
 
Vitals:  
 
 
  
Last 24hrs VS reviewed since prior progress note. Most recent are: 
 
Visit Vitals  /85 (BP 1 Location: Left arm, BP Patient Position: At rest)  Pulse (!) 116  Temp 98 °F (36.7 °C)  Resp 18  Ht 5' 6\" (1.676 m)  Wt 110 kg (242 lb 6.4 oz)  SpO2 96%  Breastfeeding No  
 BMI 39.12 kg/m2 SpO2 Readings from Last 6 Encounters:  
18 96% 18 99% 18 96% 10/30/17 97% 10/21/16 98% 10/21/16 96% Intake/Output Summary (Last 24 hours) at 18 1403 Last data filed at 18 1221 Gross per 24 hour Intake              720 ml Output             1000 ml Net             -280 ml Exam:  
 
Physical Exam: 
 
Gen:  Well-developed, well-nourished, in no acute distress HEENT:  Sclerae nonicteric, hearing intact to voice, mucous membranes moist 
Neck:  Supple, without masses. R IJ HD catheter in place. Dressing C/D/I Resp:  No accessory muscle use, CTAB without wheezes, rales, or rhonchi 
Card: RRR, without m/r/g. No LE edema. Abd:  +bowel sounds, soft, NTTP, nondistended. Neuro: Face symmetric, tongue midline, speech fluent, follows commands appropriately. 5/5 strength BUEs. RLE strength in tact. LLE 3/5 strength distal. 4/5 proximal. No sensation LLE Psych:  Alert, oriented x 3. Good insight. Tearful Medications Reviewed: (see below) Lab Data Reviewed: (see below) 
 
______________________________________________________________________ Medications: Current Facility-Administered Medications Medication Dose Route Frequency  pregabalin (LYRICA) capsule 200 mg  200 mg Oral TID  calcium gluconate 1 g in 0.9% sodium chloride 100 mL IVPB  1 g IntraVENous PRN  
 albumin human 5% (BUMINATE) solution 100 g  2,000 mL IntraVENous EVERY OTHER DAY  calcium gluconate 2 g in 0.9% sodium chloride 100 mL IVPB  2 g IntraVENous EVERY OTHER DAY  
 0.9% sodium chloride infusion 2,000 mL  2,000 mL IntraVENous EVERY OTHER DAY  anticoagulant citrate dextrose (ACD-A) solution  1,000 mL In Vitro EVERY OTHER DAY  lidocaine (XYLOCAINE) 10 mg/mL (1 %) injection 20 mL  20 mL SubCUTAneous Multiple  promethazine (PHENERGAN) 12.5 mg in 0.9% sodium chloride 50 mL IVPB  12.5 mg IntraVENous Q4H PRN  
 diphenhydrAMINE (BENADRYL) capsule 50 mg  50 mg Oral Q6H PRN  
 sorbitol 70 % solution 30 mL  30 mL Oral DAILY PRN  
 zolpidem (AMBIEN) tablet 5 mg  5 mg Oral QHS PRN  
 senna-docusate (PERICOLACE) 8.6-50 mg per tablet 1 Tab  1 Tab Oral BID  zolpidem (AMBIEN) tablet 5 mg  5 mg Oral QHS  
 HYDROmorphone (DILAUDID) tablet 2 mg  2 mg Oral Q4H PRN  
 FLUoxetine (PROzac) capsule 20 mg  20 mg Oral QHS  sodium chloride (NS) flush 5-10 mL  5-10 mL IntraVENous Q8H  
 sodium chloride (NS) flush 5-10 mL  5-10 mL IntraVENous PRN  
 acetaminophen (TYLENOL) tablet 650 mg  650 mg Oral Q4H PRN  
 enoxaparin (LOVENOX) injection 40 mg  40 mg SubCUTAneous Q24H  polyethylene glycol (MIRALAX) packet 17 g  17 g Oral BID Lab Review:  
 
Recent Labs  
   07/16/18 0527 WBC  17.0*  
HGB  13.1 HCT  39.9 PLT  127* Recent Labs  
   07/16/18 
 0527  07/15/18 
 0439  07/14/18 
 1700 NA  139   --    --   
K  4.5   --    --   
CL  104   --    --   
CO2  30   --    --   
GLU  83   --    --   
BUN  11   --    --   
CREA  0.83   --    --   
CA  7.7*   --    --   
MG  2.2   --    --   
PHOS  3.5   --    --   
ALB  2.9*   --    --   
SGOT  16   --    --   
ALT  40   -- --   
INR  1.1  1.2*  1.0 No components found for: [place] No results for input(s): PH, PCO2, PO2, HCO3, FIO2 in the last 72 hours. Recent Labs  
   07/16/18 
 0527  07/15/18 
 0439  07/14/18 
 1700 INR  1.1  1.2*  1.0 Lab Results Component Value Date/Time Specimen Description: URINE 07/11/2012 12:00 PM  
 Specimen Description: URINE 06/20/2012 01:58 AM  
 Specimen Description: CEREBROSPINAL FLUID 04/04/2012 03:20 PM  
 
Lab Results Component Value Date/Time Culture result: Culture performed on Unspun Fluid 07/09/2018 04:36 PM  
 Culture result: NO GROWTH ON SOLID MEDIA AT 4 DAYS 07/09/2018 04:36 PM  
 Culture result: NO GROWTH IN THIO BROTH AT 7 DAYS 07/09/2018 04:36 PM  
 
 
 
  
Assessment / Plan:  
 
  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 Case again discussed with Dr. Tena Robertson, appreciate her assistance with this complex case. As pt had no improvement on  IV steroids, started on plasmapheresis 7/14. Planning 5 sessions, every other day. MRI repeated with contrast still showed no lesions. Left leg weakness / Numbness in left leg: mgmt as above. Continue PT/OT. Depending on response, will likely need placement Pain: apparently common in NMO. On high dose opioids already (2mg PO Dilaudid q4hour PRN). Agree with Lyrica. Would avoid IV opioids if possible. Lengthy discussion with pt, neurology, and palliative care regarding this today. Appreciate Dr. Lupe Baltazar Seizures ([facility] [address] (): no recent SZ. Not on AED. Follow up with neurology Nonintractable migraine: post-LP HA resolved after blood patch. Urinary incontinence: due to NMO. Purewick. No quinonez Bradycardia, sinus: TSH WNR. Monitor on tele. Now with tachycardia Thrombocytopenia / leukocytosis: may be related to plasmapheresis. Monitor CBC. Checking fibrinogen tomorrow Obesity (BMI 30-39. 9): would benefit from weight loss and dietary / lifestyle modifications Constipation: cont bowel regimen. KUB unremarkable. Had BM Total time spent: 45 minutes Time spent in the care of this patient including reviewing records, discussing with nursing and/or other providers on the treatment team, obtaining history and examining the patient, and discussing treatment plans. Care Plan discussed with: Patient, Nursing Staff and >50% of time spent in counseling and coordination of care Discussed:  Care Plan Prophylaxis:  Lovenox Disposition:  [facility]/Rehab 
        
___________________________________________________ Attending Physician: Shireen Santana MD

## 2018-07-16 NOTE — PROGRESS NOTES
Palliative Medicine Consult    Patient Name: Sapna Aguila  YOB: 1990    Date of Initial Consult: 7/10/18  Reason for Consult: Overwhelming symptoms  Requesting Provider: Dr. Giuseppe Diaz  Primary Care Physician: Ehsan Deal NP      SUMMARY:   Sapna Aguila is a 34 y.o. with a past history of seizure d/o(not on meds), who was admitted on 7/8/2018 from home with a diagnosis of numbness in LE and back pain. Current medical issues leading to Palliative Medicine involvement include: overwhelming symptoms    Chart reviewed/HPI-patient with prior h/o of lower extremity weakness/numbness abput 7 years ago. She was admitted to [facility] but cause never known and she was discharged after 1 week in the hospital. During that time, she was receiving high doses of IV dilaudid to the point where her mom apparently weaned her off once she returned home. Patient was returning from the beach last Thursday and as she was getting into her car, her left leg \"gave out\" and had associated back pain. Thought maybe related to sleeping on different bed at the beach but over the weekend, symptoms progressed to include left leg numbness to right leg numbness and then numbness from the waist distally. In addition, she describes severe back pain. Workup to include MRI brain, cervical, thoracic and lumbar spine, LP, neurology evaluation have not been conclusive. There is concern about transverse myelitis and patient been started on high dose IV solumedrol. In addition, she has been placed on gabapentin 600 mg(dose increased earlier today by Neurology). Since being in the hospital, she has used ultram, zanaflex, percocet and toradol and states none have provided her any relief of her back pain. Patient with other issues to include urinary incontinence and constipation issues.       reviewed  Only 2 scripts in the last year->one for valium on 4/24/18 and 1 for percocet 5/325 on 9/7/17    SH-lives with her boyfriend, Denies any illicit drug use. No significant alcohol intake. Works at [employer] with [employer]. Mom is a nurse with [employer]:   1. Acute back pain, unclear etiology-both neuropathic and somatic pain  2. 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 Numbness in lower extremities, unclear cause-workup in progress  4. Debility         PLAN:   1. Met with patient and father and reviewed the events from weekend. Patient started plasma exchange with the positive antibody(completed second round today), gabapentin changed to lyrica and still been using dilaudid 2 mg po.   2. Pain management-remains a complicated situation since her positive antibody but other studies(MRI of the spine does not show any abnormalities consistent with neuromyelitis. Discussed situation with Dr. Ranjana Tierney and we certainly the pain burden in these situations typically more neuropathic pain and the first 2 weeks tends to be the worse. Having said that, at times, pains seems out of proportion to clinical findings. Discussed again at length with patient and father about why we remain very careful with IV opioids but given this new dx and we are 1 week into the disease, I think reasonable to use IV dilaudid for a limited amount of time to see if can improve her pain management. Will start dilaudid 1 mg IV every 4 hours prn severe pain(explained that 1 mg IV dilaudid=4 mg po dilaudid so this is double her current dosing) and continue po dilaudid 2 mg po every 4 hours prn moderate pain. Will revaluate each day and think best that only 1 of us manage her opioids so that it does not become confusing or dangerous. 3. Discussed with Dr. Margie Coleman and Dr. Ranjana Tierney  4. Constipation-continues to focus on her stools. KUB is unremarkable. Over the weekend,  patient telling providers she has not been having bowel movements but she had several bowel movements last week->they were more \"pudding like\" and not as solid as she was hoping. Continue current regiment  5. Discussed with bedside nurse(Amanda)  6. Initial consult note routed to primary continuity provider  7. Communicated plan of care with: Palliative IDT       GOALS OF CARE / TREATMENT PREFERENCES:   [====Goals of Care====]  GOALS OF CARE:  Patient/Health Care Proxy Stated Goals: Rehabilitation      TREATMENT PREFERENCES:   Code Status: Full Code    Advance Care Planning:  Advance Care Planning 7/8/2018   Patient's Healthcare Decision Maker is: Legal Next of Kin   Confirm Advance Directive None       Medical Interventions: Full interventions  Other Instructions: The palliative care team has discussed with patient / health care proxy about goals of care / treatment preferences for patient.  [====Goals of Care====]         HISTORY:     History obtained from: chart, patient, father    CHIEF COMPLAINT: back pain    HPI/SUBJECTIVE:    The patient is:   [x] Verbal and participatory  [] Non-participatory due to:   Patient is alert and oriented. Just finished her plasma exchange. Appears slightly uncomfortable.       Clinical Pain Assessment (nonverbal scale for severity on nonverbal patients):   Clinical Pain Assessment  Severity: 7  Location: lower back  Character: throbbing  Duration: days  Effect: difficult to ambulate  Factors: movement  Frequency: constant            FUNCTIONAL ASSESSMENT:     Palliative Performance Scale (PPS):  PPS: 50       PSYCHOSOCIAL/SPIRITUAL SCREENING:     Advance Care Planning:  Advance Care Planning 7/8/2018   Patient's Healthcare Decision Maker is: Legal Next of Kin   Confirm Advance Directive None        Any spiritual / Restorationist concerns:  [] Yes /  [x] No    Caregiver Burnout:  [] Yes /  [x] No /  [] No Caregiver Present      Anticipatory grief assessment:   [x] Normal  / [] Maladaptive       ESAS Anxiety: Anxiety: 1    ESAS Depression: Depression: 0        REVIEW OF SYSTEMS:     Positive and pertinent negative findings in ROS are noted above in HPI. The following systems were [x] reviewed / [] unable to be reviewed as noted in HPI  Other findings are noted below. Systems: constitutional, ears/nose/mouth/throat, respiratory, gastrointestinal, genitourinary, musculoskeletal, integumentary, neurologic, psychiatric, endocrine. Positive findings noted below. Modified ESAS Completed by: provider   Fatigue: 1 Drowsiness: 0   Depression: 0 Pain: 7   Anxiety: 1 Nausea: 0   Anorexia: 0 Dyspnea: 0     Constipation: Yes     Stool Occurrence(s): 1        PHYSICAL EXAM:     From RN flowsheet:  Wt Readings from Last 3 Encounters:   07/15/18 242 lb 6.4 oz (110 kg)   04/27/18 251 lb (113.9 kg)   04/27/18 246 lb 14.6 oz (112 kg)     Blood pressure 110/90, pulse (!) 110, temperature 97.9 °F (36.6 °C), resp. rate 18, height 5' 6\" (1.676 m), weight 242 lb 6.4 oz (110 kg), SpO2 97 %, not currently breastfeeding. Pain Scale 1: FLACC  Pain Intensity 1: 10  Pain Onset 1: acute  Pain Location 1: Back  Pain Orientation 1: Lower  Pain Description 1: Constant  Pain Intervention(s) 1: Medication (see MAR)  Last bowel movement, if known:     General-NAD, calm  Lungs-CTA  CV-RRR  Neuro-numbness remains in the left leg with associated weakness, mild TTP lower back area     HISTORY:     Principal Problem:    Neuromyelitis optica ([address] (7/14/2018)    Active Problems:    Left leg weakness (7/8/2018)      Numbness in left leg (7/8/2018)      Seizures (HCC) ()      Nonintractable migraine (7/8/2018)      Urinary incontinence (7/9/2018)      Bradycardia, sinus (7/9/2018)      Obesity (BMI 30-39.9) (7/9/2018)      Neuromyelitis optica spectrum disorder ([address] (7/14/2018)      Past Medical History:   Diagnosis Date    Headache     Headache(784.0)     Neuromyelitis optica ([address] 7/14/2018    Obesity (BMI 30-39. 9) 7/9/2018    Seizures ([address]       Past Surgical History:   Procedure Laterality Date    ENDOSCOPY, COLON, DIAGNOSTIC      HX APPENDECTOMY      HX CHOLECYSTECTOMY      HX GI      HX GYN        Family History   Problem Relation Age of Onset    Hypertension Father     Diabetes Maternal Grandmother     Coronary Artery Disease Maternal Grandfather     Diabetes Maternal Grandfather     Diabetes Paternal Grandfather       History reviewed, no pertinent family history. Social History   Substance Use Topics    Smoking status: Current Every Day Smoker    Smokeless tobacco: Never Used    Alcohol use Yes     Allergies   Allergen Reactions    Codeine Other (comments)     Tachycardia    Sulfa (Sulfonamide Antibiotics) Itching    Bactrim [Sulfamethoprim Ds] Itching    Chocolate Flavor Other (comments)     Pt reports migraines    Clindamycin Other (comments)     Chest tightness    Compazine [Prochlorperazine Edisylate] Other (comments)     Neck and jaw spasm  And difficultly swallow     Morphine Hives    Peanut Other (comments)     migraines    Reglan [Metoclopramide] Nausea and Vomiting and Vertigo     Sleep disorder and shaking    Sulfur Swelling     Throat closes.     Zofran [Ondansetron Hcl (Pf)] Hives    Metaxalone Palpitations      Current Facility-Administered Medications   Medication Dose Route Frequency    pregabalin (LYRICA) capsule 200 mg  200 mg Oral TID    HYDROmorphone (PF) (DILAUDID) injection 1 mg  1 mg IntraVENous Q4H PRN    HYDROmorphone (DILAUDID) tablet 2 mg  2 mg Oral Q4H PRN    calcium gluconate 1 g in 0.9% sodium chloride 100 mL IVPB  1 g IntraVENous PRN    albumin human 5% (BUMINATE) solution 100 g  2,000 mL IntraVENous EVERY OTHER DAY    calcium gluconate 2 g in 0.9% sodium chloride 100 mL IVPB  2 g IntraVENous EVERY OTHER DAY    0.9% sodium chloride infusion 2,000 mL  2,000 mL IntraVENous EVERY OTHER DAY    anticoagulant citrate dextrose (ACD-A) solution  1,000 mL In Vitro EVERY OTHER DAY    lidocaine (XYLOCAINE) 10 mg/mL (1 %) injection 20 mL  20 mL SubCUTAneous Multiple    promethazine (PHENERGAN) 12.5 mg in 0.9% sodium chloride 50 mL IVPB  12.5 mg IntraVENous Q4H PRN    diphenhydrAMINE (BENADRYL) capsule 50 mg  50 mg Oral Q6H PRN    sorbitol 70 % solution 30 mL  30 mL Oral DAILY PRN    zolpidem (AMBIEN) tablet 5 mg  5 mg Oral QHS PRN    senna-docusate (PERICOLACE) 8.6-50 mg per tablet 1 Tab  1 Tab Oral BID    zolpidem (AMBIEN) tablet 5 mg  5 mg Oral QHS    FLUoxetine (PROzac) capsule 20 mg  20 mg Oral QHS    sodium chloride (NS) flush 5-10 mL  5-10 mL IntraVENous Q8H    sodium chloride (NS) flush 5-10 mL  5-10 mL IntraVENous PRN    acetaminophen (TYLENOL) tablet 650 mg  650 mg Oral Q4H PRN    enoxaparin (LOVENOX) injection 40 mg  40 mg SubCUTAneous Q24H    polyethylene glycol (MIRALAX) packet 17 g  17 g Oral BID          LAB AND IMAGING FINDINGS:     Lab Results   Component Value Date/Time    WBC 17.0 (H) 07/16/2018 05:27 AM    HGB 13.1 07/16/2018 05:27 AM    PLATELET 896 (L) 69/40/5699 05:27 AM     Lab Results   Component Value Date/Time    Sodium 139 07/16/2018 05:27 AM    Potassium 4.5 07/16/2018 05:27 AM    Chloride 104 07/16/2018 05:27 AM    CO2 30 07/16/2018 05:27 AM    BUN 11 07/16/2018 05:27 AM    Creatinine 0.83 07/16/2018 05:27 AM    Calcium 7.7 (L) 07/16/2018 05:27 AM    Magnesium 2.2 07/16/2018 05:27 AM    Phosphorus 3.5 07/16/2018 05:27 AM      Lab Results   Component Value Date/Time    AST (SGOT) 16 07/16/2018 05:27 AM    Alk.  phosphatase 38 (L) 07/16/2018 05:27 AM    Protein, total 4.6 (L) 07/16/2018 05:27 AM    Albumin 2.9 (L) 07/16/2018 05:27 AM    Globulin 1.7 (L) 07/16/2018 05:27 AM     Lab Results   Component Value Date/Time    INR 1.1 07/16/2018 05:27 AM    Prothrombin time 10.6 07/16/2018 05:27 AM      No results found for: IRON, FE, TIBC, IBCT, PSAT, FERR   No results found for: PH, PCO2, PO2  No components found for: GLPOC   No results found for: CPK, CKMB             Total time:   Counseling / coordination time, spent as noted above: > 50% counseling / coordination?:     Prolonged service was provided for  []30 min   []75 min in face to face time in the presence of the patient, spent as noted above. Time Start:   Time End:   Note: this can only be billed with 42419 (initial) or 09501 (follow up). If multiple start / stop times, list each separately.

## 2018-07-16 NOTE — PROGRESS NOTES
Neurology Progress Note    Interval Hx:      Follow up: Acute onset left leg weakness, severe lower back pain, right leg tingling/ paresthesias, urinary retention/incontinence, generalized hyperreflexia. Subjective:  Patient continues to report pain and states that this is making her lose strength again. Continues with incontinence and constipation. Lab review:  So far CSF has returned normal (WBC 4, Protein 38, Gram stain negative to date, HSV PCR negative), other than few hundred RBCs which is likely d/t traumatic tap. MS panel neg. CRP, CBC normal (other than mildly elevated Abs Lymphocytes 3.9). Other labs: ISABELLA, Copper, anti-ds DNA, urine heavy metals, lyme antibodies, SSA, SSB, were all negative.   NMO antibody was positive at >6        Current Facility-Administered Medications:     pregabalin (LYRICA) capsule 200 mg, 200 mg, Oral, BID, Mahi Fields MD, 200 mg at 07/16/18 0854    calcium gluconate 1 g in 0.9% sodium chloride 100 mL IVPB, 1 g, IntraVENous, PRN, Leeann Villegas MD    albumin human 5% (BUMINATE) solution 100 g, 2,000 mL, IntraVENous, EVERY OTHER DAY, Leeann Villegas MD, 100 g at 07/14/18 1757    calcium gluconate 2 g in 0.9% sodium chloride 100 mL IVPB, 2 g, IntraVENous, EVERY OTHER DAY, Leeann Vilelgas MD, Last Rate: 120 mL/hr at 07/14/18 1800, 2 g at 07/14/18 1800    0.9% sodium chloride infusion 2,000 mL, 2,000 mL, IntraVENous, EVERY OTHER DAY, Leeann Villegas MD, 2,000 mL at 07/14/18 1759    anticoagulant citrate dextrose (ACD-A) solution, 1,000 mL, In Vitro, EVERY OTHER DAY, Leeann Villegas MD, 1,000 mL at 07/14/18 1800    lidocaine (XYLOCAINE) 10 mg/mL (1 %) injection 20 mL, 20 mL, SubCUTAneous, Multiple, Refugio Oneal MD    promethazine (PHENERGAN) 12.5 mg in 0.9% sodium chloride 50 mL IVPB, 12.5 mg, IntraVENous, Q4H PRN, Nickolas BEAULIEU Do, MD, Last Rate: 200 mL/hr at 07/16/18 0558, 12.5 mg at 07/16/18 0558    diphenhydrAMINE (BENADRYL) capsule 50 mg, 50 mg, Oral, Q6H PRN, Dino Colon MD, 50 mg at 07/16/18 0602    sorbitol 70 % solution 30 mL, 30 mL, Oral, DAILY PRN, Una Lesches, MD, 30 mL at 07/16/18 1007    zolpidem (AMBIEN) tablet 5 mg, 5 mg, Oral, QHS PRN, Dino Colon MD, 5 mg at 07/15/18 2145    senna-docusate (PERICOLACE) 8.6-50 mg per tablet 1 Tab, 1 Tab, Oral, BID, Dino Colon MD, 1 Tab at 07/16/18 0854    zolpidem (AMBIEN) tablet 5 mg, 5 mg, Oral, QHS, Dino Colon MD, 5 mg at 07/15/18 2145    HYDROmorphone (DILAUDID) tablet 2 mg, 2 mg, Oral, U8M PRN, Una Lesches, MD, 2 mg at 07/16/18 0854    FLUoxetine (PROzac) capsule 20 mg, 20 mg, Oral, QHS, Dino Colon MD, 20 mg at 07/15/18 2145    sodium chloride (NS) flush 5-10 mL, 5-10 mL, IntraVENous, Q8H, Bravo Taylor MD, 10 mL at 07/16/18 0600    sodium chloride (NS) flush 5-10 mL, 5-10 mL, IntraVENous, PRN, Bravo Taylor MD, 10 mL at 07/09/18 1400    acetaminophen (TYLENOL) tablet 650 mg, 650 mg, Oral, Q4H PRN, Bravo Taylor MD, 650 mg at 07/10/18 0658    enoxaparin (LOVENOX) injection 40 mg, 40 mg, SubCUTAneous, Q24H, Bravo Taylor MD, 40 mg at 07/15/18 1956    polyethylene glycol (MIRALAX) packet 17 g, 17 g, Oral, BID, Bravo Taylor MD, 17 g at 07/16/18 0854    Physical Exam    Patient Vitals for the past 12 hrs:   Temp Pulse Resp BP SpO2   07/16/18 0720 98.4 °F (36.9 °C) 85 18 121/81 95 %   07/16/18 0702 - 91 - - -   07/16/18 0313 98.7 °F (37.1 °C) 88 18 114/78 96 %   07/16/18 0001 99.2 °F (37.3 °C) 93 18 109/77 96 %       Awake, resting in bed    Focused Neurological Exam   Extraocular movement intact bilaterally  Face appears symmetric  Hearing / Language intact to casual conversation    Sensory:   Left lower ext: absent pinprick/temp to level of T12  Right lower ext: intact LT, prick dec to mid thigh    Motor:   5/5 strength both arms  4/5 right leg  Left leg with at least 3/5 strength distally, 4/5 proximally in hip  Gait: not tested      Impression/ Plan    34 y.o. female with Acute onset left leg weakness, severe lower back pain, right leg tingling/ paresthesias, urinary retention, generalized hyperreflexia    Repeat MRIs Brain, Cervical, Thoracic, Lumbar spine  (all with contrast) have not shown etiology (specifically no cord or CNS lesions, no spinal stenosis, no disc herniation). LP with minimal RBCs (not suggestive of SAH) and no evidence of infection or inflammation (normal WBCs, Protein, Glucose, Gram stain, HSV PCR). MS Panel was negative. However NMO was positive. 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 Pt has completed IV solumedrol so will now consult nephrology and initiate PLEX with a plan for 5 exchanges. Today will be # 2 of 5    Increase Lyrica to 200mg TID    Cont with palliative for pain management. Pt may need stronger treatment than oral meds are providing. However, this patient's primary and I are concerned by giving her IV opiates. Will defer to palliative regarding this. I do suspect patient significant neuropathic pain, but I am hopeful that the Lyrica will help with this. Cont aggressive bowel regimen as patient has not had a BM in a week. I suspect the pain medication could be contributing to this. Will monitor heart rate with remote telemetry as treatment is initiated. Reviewed the diagnoses and treatment plans with patient in detail at the bedside    Will follow closely with you. Signed By: Charles Bolanos MD     July 16, 2018      Over 35 minutes was spent reviewing records, discussing with Dr. Davonte Berrios and nursing, obtaining history, examining patient and discussing treatment plans.

## 2018-07-17 LAB
ALBUMIN SERPL-MCNC: 2.9 G/DL (ref 3.5–5)
ALBUMIN/GLOB SERPL: 2.1 {RATIO} (ref 1.1–2.2)
ALP SERPL-CCNC: 35 U/L (ref 45–117)
ALT SERPL-CCNC: 60 U/L (ref 12–78)
ANION GAP SERPL CALC-SCNC: 10 MMOL/L (ref 5–15)
AST SERPL-CCNC: 29 U/L (ref 15–37)
BASOPHILS # BLD: 0 K/UL (ref 0–0.1)
BASOPHILS NFR BLD: 0 % (ref 0–1)
BILIRUB SERPL-MCNC: 0.5 MG/DL (ref 0.2–1)
BUN SERPL-MCNC: 8 MG/DL (ref 6–20)
BUN/CREAT SERPL: 10 (ref 12–20)
CALCIUM SERPL-MCNC: 8.1 MG/DL (ref 8.5–10.1)
CHLORIDE SERPL-SCNC: 106 MMOL/L (ref 97–108)
CO2 SERPL-SCNC: 25 MMOL/L (ref 21–32)
CREAT SERPL-MCNC: 0.83 MG/DL (ref 0.55–1.02)
DIFFERENTIAL METHOD BLD: ABNORMAL
EOSINOPHIL # BLD: 0.2 K/UL (ref 0–0.4)
EOSINOPHIL NFR BLD: 1 % (ref 0–7)
ERYTHROCYTE [DISTWIDTH] IN BLOOD BY AUTOMATED COUNT: 13.1 % (ref 11.5–14.5)
FIBRINOGEN PPP-MCNC: 137 MG/DL (ref 200–475)
GLOBULIN SER CALC-MCNC: 1.4 G/DL (ref 2–4)
GLUCOSE SERPL-MCNC: 101 MG/DL (ref 65–100)
HCT VFR BLD AUTO: 39.2 % (ref 35–47)
HGB BLD-MCNC: 13 G/DL (ref 11.5–16)
IMM GRANULOCYTES # BLD: 0 K/UL
IMM GRANULOCYTES NFR BLD AUTO: 0 %
INR PPP: 1.1 (ref 0.9–1.1)
LYMPHOCYTES # BLD: 3.6 K/UL (ref 0.8–3.5)
LYMPHOCYTES NFR BLD: 17 % (ref 12–49)
MAGNESIUM SERPL-MCNC: 1.9 MG/DL (ref 1.6–2.4)
MCH RBC QN AUTO: 31.9 PG (ref 26–34)
MCHC RBC AUTO-ENTMCNC: 33.2 G/DL (ref 30–36.5)
MCV RBC AUTO: 96.3 FL (ref 80–99)
MONOCYTES # BLD: 1.7 K/UL (ref 0–1)
MONOCYTES NFR BLD: 8 % (ref 5–13)
NEUTS BAND NFR BLD MANUAL: 2 % (ref 0–6)
NEUTS SEG # BLD: 15.7 K/UL (ref 1.8–8)
NEUTS SEG NFR BLD: 72 % (ref 32–75)
NRBC # BLD: 0 K/UL (ref 0–0.01)
NRBC BLD-RTO: 0 PER 100 WBC
PHOSPHATE SERPL-MCNC: 5.9 MG/DL (ref 2.6–4.7)
PLATELET # BLD AUTO: 116 K/UL (ref 150–400)
PMV BLD AUTO: 10.2 FL (ref 8.9–12.9)
POTASSIUM SERPL-SCNC: 4.3 MMOL/L (ref 3.5–5.1)
PROT SERPL-MCNC: 4.3 G/DL (ref 6.4–8.2)
PROTHROMBIN TIME: 10.9 SEC (ref 9–11.1)
RBC # BLD AUTO: 4.07 M/UL (ref 3.8–5.2)
RBC MORPH BLD: ABNORMAL
RBC MORPH BLD: ABNORMAL
SODIUM SERPL-SCNC: 141 MMOL/L (ref 136–145)
WBC # BLD AUTO: 21.2 K/UL (ref 3.6–11)

## 2018-07-17 PROCEDURE — 93970 EXTREMITY STUDY: CPT

## 2018-07-17 PROCEDURE — 74011250637 HC RX REV CODE- 250/637: Performed by: FAMILY MEDICINE

## 2018-07-17 PROCEDURE — 74011250637 HC RX REV CODE- 250/637: Performed by: PSYCHIATRY & NEUROLOGY

## 2018-07-17 PROCEDURE — 80053 COMPREHEN METABOLIC PANEL: CPT | Performed by: INTERNAL MEDICINE

## 2018-07-17 PROCEDURE — 83735 ASSAY OF MAGNESIUM: CPT | Performed by: INTERNAL MEDICINE

## 2018-07-17 PROCEDURE — 74011250636 HC RX REV CODE- 250/636: Performed by: FAMILY MEDICINE

## 2018-07-17 PROCEDURE — 36415 COLL VENOUS BLD VENIPUNCTURE: CPT | Performed by: STUDENT IN AN ORGANIZED HEALTH CARE EDUCATION/TRAINING PROGRAM

## 2018-07-17 PROCEDURE — 65660000000 HC RM CCU STEPDOWN

## 2018-07-17 PROCEDURE — 74011250636 HC RX REV CODE- 250/636: Performed by: INTERNAL MEDICINE

## 2018-07-17 PROCEDURE — 77030038269 HC DRN EXT URIN PURWCK BARD -A

## 2018-07-17 PROCEDURE — 85610 PROTHROMBIN TIME: CPT | Performed by: STUDENT IN AN ORGANIZED HEALTH CARE EDUCATION/TRAINING PROGRAM

## 2018-07-17 PROCEDURE — 84100 ASSAY OF PHOSPHORUS: CPT | Performed by: INTERNAL MEDICINE

## 2018-07-17 PROCEDURE — 85025 COMPLETE CBC W/AUTO DIFF WBC: CPT | Performed by: INTERNAL MEDICINE

## 2018-07-17 PROCEDURE — 74011250637 HC RX REV CODE- 250/637: Performed by: INTERNAL MEDICINE

## 2018-07-17 PROCEDURE — 85384 FIBRINOGEN ACTIVITY: CPT | Performed by: STUDENT IN AN ORGANIZED HEALTH CARE EDUCATION/TRAINING PROGRAM

## 2018-07-17 RX ORDER — NYSTATIN 100000 [USP'U]/ML
500000 SUSPENSION ORAL 4 TIMES DAILY
Status: DISCONTINUED | OUTPATIENT
Start: 2018-07-17 | End: 2018-07-23

## 2018-07-17 RX ADMIN — HYDROMORPHONE HYDROCHLORIDE 1 MG: 2 INJECTION, SOLUTION INTRAMUSCULAR; INTRAVENOUS; SUBCUTANEOUS at 09:40

## 2018-07-17 RX ADMIN — NYSTATIN 500000 UNITS: 100000 SUSPENSION ORAL at 09:40

## 2018-07-17 RX ADMIN — HYDROMORPHONE HYDROCHLORIDE 1 MG: 2 INJECTION, SOLUTION INTRAMUSCULAR; INTRAVENOUS; SUBCUTANEOUS at 21:23

## 2018-07-17 RX ADMIN — SENNOSIDES AND DOCUSATE SODIUM 1 TABLET: 8.6; 5 TABLET ORAL at 09:34

## 2018-07-17 RX ADMIN — NYSTATIN 500000 UNITS: 100000 SUSPENSION ORAL at 21:10

## 2018-07-17 RX ADMIN — Medication 10 ML: at 05:21

## 2018-07-17 RX ADMIN — HYDROMORPHONE HYDROCHLORIDE 2 MG: 2 TABLET ORAL at 04:53

## 2018-07-17 RX ADMIN — ENOXAPARIN SODIUM 40 MG: 40 INJECTION SUBCUTANEOUS at 17:06

## 2018-07-17 RX ADMIN — HYDROMORPHONE HYDROCHLORIDE 1 MG: 2 INJECTION, SOLUTION INTRAMUSCULAR; INTRAVENOUS; SUBCUTANEOUS at 17:06

## 2018-07-17 RX ADMIN — POLYETHYLENE GLYCOL (3350) 17 G: 17 POWDER, FOR SOLUTION ORAL at 09:34

## 2018-07-17 RX ADMIN — SENNOSIDES AND DOCUSATE SODIUM 1 TABLET: 8.6; 5 TABLET ORAL at 17:06

## 2018-07-17 RX ADMIN — DIPHENHYDRAMINE HYDROCHLORIDE 50 MG: 25 CAPSULE ORAL at 21:10

## 2018-07-17 RX ADMIN — NYSTATIN 500000 UNITS: 100000 SUSPENSION ORAL at 13:35

## 2018-07-17 RX ADMIN — PREGABALIN 200 MG: 100 CAPSULE ORAL at 09:34

## 2018-07-17 RX ADMIN — HYDROMORPHONE HYDROCHLORIDE 2 MG: 2 TABLET ORAL at 00:39

## 2018-07-17 RX ADMIN — FLUOXETINE 20 MG: 20 CAPSULE ORAL at 21:10

## 2018-07-17 RX ADMIN — POLYETHYLENE GLYCOL (3350) 17 G: 17 POWDER, FOR SOLUTION ORAL at 17:06

## 2018-07-17 RX ADMIN — ZOLPIDEM TARTRATE 5 MG: 5 TABLET ORAL at 21:10

## 2018-07-17 RX ADMIN — PREGABALIN 200 MG: 100 CAPSULE ORAL at 21:10

## 2018-07-17 RX ADMIN — HYDROMORPHONE HYDROCHLORIDE 1 MG: 2 INJECTION, SOLUTION INTRAMUSCULAR; INTRAVENOUS; SUBCUTANEOUS at 13:39

## 2018-07-17 RX ADMIN — Medication 10 ML: at 21:28

## 2018-07-17 RX ADMIN — Medication 10 ML: at 17:07

## 2018-07-17 RX ADMIN — NYSTATIN 500000 UNITS: 100000 SUSPENSION ORAL at 17:06

## 2018-07-17 NOTE — PROGRESS NOTES
[address] 
[facility] [address] [place] [address] 
[phone] Medical Progress Note NAME: Monica Bateman :  1990 MRM:  [id number] Date/Time: 2018 Subjective: Chief Complaint:  F/u weakness/numbness Chart/notes/labs/studies reviewed, patient examined at bedside. No improvement in strength or numbness. Sore throat. Pain better after IV Dilaudid. Reports leg swelling Objective:  
 
 
Vitals:  
 
 
  
Last 24hrs VS reviewed since prior progress note. Most recent are: 
 
Visit Vitals  /74 (BP 1 Location: Left arm, BP Patient Position: At rest)  Pulse (!) 111  Temp 97.7 °F (36.5 °C)  Resp 18  Ht 5' 6\" (1.676 m)  Wt 113.7 kg (250 lb 9.6 oz)  SpO2 98%  Breastfeeding No  
 BMI 40.45 kg/m2 SpO2 Readings from Last 6 Encounters:  
18 98% 18 99% 18 96% 10/30/17 97% 10/21/16 98% 10/21/16 96% Intake/Output Summary (Last 24 hours) at 18 1428 Last data filed at 18 1101 Gross per 24 hour Intake              751 ml Output             4688 ml Net            -3937 ml Exam:  
 
Physical Exam: 
 
Gen:  Well-developed, well-nourished, in no acute distress HEENT:  Sclerae nonicteric, hearing intact to voice, mucous membranes moist. White plaque on tongue and oropharynx Neck:  Supple, without masses. R IJ HD catheter in place. Dressing C/D/I Resp:  No accessory muscle use, CTAB without wheezes, rales, or rhonchi 
Card: RRR, without m/r/g. Trace BLE edema. Abd:  +bowel sounds, soft, NTTP, nondistended. Neuro: Face symmetric, tongue midline, speech fluent, follows commands appropriately. 5/5 strength BUEs. RLE strength in tact. LLE 3/5 strength distal. 4/5 proximal. No sensation LLE Psych:  Alert, oriented x 3. Fair insight. Medications Reviewed: (see below) Lab Data Reviewed: (see below) 
 
______________________________________________________________________ Medications:  
 
Current Facility-Administered Medications Medication Dose Route Frequency  nystatin (MYCOSTATIN) 100,000 unit/mL oral suspension 500,000 Units  500,000 Units Oral QID  pregabalin (LYRICA) capsule 200 mg  200 mg Oral TID  
 HYDROmorphone (PF) (DILAUDID) injection 1 mg  1 mg IntraVENous Q4H PRN  
 HYDROmorphone (DILAUDID) tablet 2 mg  2 mg Oral Q4H PRN  
 calcium gluconate 1 g in 0.9% sodium chloride 100 mL IVPB  1 g IntraVENous PRN  
 albumin human 5% (BUMINATE) solution 100 g  2,000 mL IntraVENous EVERY OTHER DAY  calcium gluconate 2 g in 0.9% sodium chloride 100 mL IVPB  2 g IntraVENous EVERY OTHER DAY  
 0.9% sodium chloride infusion 2,000 mL  2,000 mL IntraVENous EVERY OTHER DAY  anticoagulant citrate dextrose (ACD-A) solution  1,000 mL In Vitro EVERY OTHER DAY  lidocaine (XYLOCAINE) 10 mg/mL (1 %) injection 20 mL  20 mL SubCUTAneous Multiple  promethazine (PHENERGAN) 12.5 mg in 0.9% sodium chloride 50 mL IVPB  12.5 mg IntraVENous Q4H PRN  
 diphenhydrAMINE (BENADRYL) capsule 50 mg  50 mg Oral Q6H PRN  
 sorbitol 70 % solution 30 mL  30 mL Oral DAILY PRN  
 zolpidem (AMBIEN) tablet 5 mg  5 mg Oral QHS PRN  
 senna-docusate (PERICOLACE) 8.6-50 mg per tablet 1 Tab  1 Tab Oral BID  zolpidem (AMBIEN) tablet 5 mg  5 mg Oral QHS  FLUoxetine (PROzac) capsule 20 mg  20 mg Oral QHS  sodium chloride (NS) flush 5-10 mL  5-10 mL IntraVENous Q8H  
 sodium chloride (NS) flush 5-10 mL  5-10 mL IntraVENous PRN  
 acetaminophen (TYLENOL) tablet 650 mg  650 mg Oral Q4H PRN  
 enoxaparin (LOVENOX) injection 40 mg  40 mg SubCUTAneous Q24H  polyethylene glycol (MIRALAX) packet 17 g  17 g Oral BID Lab Review:  
 
Recent Labs  
   07/17/18 
 7414  07/16/18 
 1652 WBC  21.2*  17.0*  
HGB  13.0  13.1 HCT  39.2  39.9 PLT  116*  127* Recent Labs  
   07/17/18 
 0693 07/17/18 
 0405  07/16/18 
 9984  07/15/18 
 4514 NA  141   --   139   --   
K  4.3   --   4.5   --   
CL  106   --   104   --   
CO2  25   --   30   --   
GLU  101*   --   83   --   
BUN  8   --   11   --   
CREA  0.83   --   0.83   --   
CA  8.1*   --   7.7*   --   
MG  1.9   --   2.2   --   
PHOS  5.9*   --   3.5   --   
ALB  2.9*   --   2.9*   --   
SGOT  29   --   16   --   
ALT  60   --   40   --   
INR   --   1.1  1.1  1.2* No components found for: [place] No results for input(s): PH, PCO2, PO2, HCO3, FIO2 in the last 72 hours. Recent Labs  
   07/17/18 
 0405  07/16/18 
 0527  07/15/18 
 7853 INR  1.1  1.1  1.2* Lab Results Component Value Date/Time Specimen Description: URINE 07/11/2012 12:00 PM  
 Specimen Description: URINE 06/20/2012 01:58 AM  
 Specimen Description: CEREBROSPINAL FLUID 04/04/2012 03:20 PM  
 
Lab Results Component Value Date/Time Culture result: Culture performed on Unspun Fluid 07/09/2018 04:36 PM  
 Culture result: NO GROWTH ON SOLID MEDIA AT 4 DAYS 07/09/2018 04:36 PM  
 Culture result: NO GROWTH IN THIO BROTH AT 7 DAYS 07/09/2018 04:36 PM  
 
 
 
  
Assessment / Plan:  
 
  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 As pt had no improvement on  IV steroids, started on plasmapheresis. Planning 5 sessions, every other day (started 7/14). MRI repeated with contrast still showed no lesions. Left leg weakness / Numbness in left leg: mgmt as above. Continue PT/OT. Depending on response, will likely need placement. Discharge planning. Discussed with CM Pain: apparently common in NMO. On high dose opioids already (2mg PO Dilaudid q4hour PRN). Agree with Lyrica. Appreciate palliative care Dr. Laquita Padilla. Now on IV Dilauid PRN Sore throat: due to thrush from steroids.  Start Nystatin swish and swallow Seizures ([facility] [address] (): no recent SZ. Not on AED. Follow up with neurology Nonintractable migraine: post-LP HA resolved after blood patch. Urinary incontinence: due to NMO. Purewick. No quinonez Bradycardia, sinus: TSH WNR. Monitor on tele. Now with tachycardia Thrombocytopenia / leukocytosis: may be related to plasmapheresis. Monitor CBC. Monitor fibrinogen. Discussed with nephrology Obesity (BMI 30-39. 9): would benefit from weight loss and dietary / lifestyle modifications Constipation: cont bowel regimen. KUB unremarkable. Had BMs LE edema: mild. No DVT on dopplers Total time spent: 35 minutes, d/w nephrology, neurology, IDT rounding staff Time spent in the care of this patient including reviewing records, discussing with nursing and/or other providers on the treatment team, obtaining history and examining the patient, and discussing treatment plans. Care Plan discussed with: Patient, Nursing Staff and >50% of time spent in counseling and coordination of care Discussed:  Care Plan Prophylaxis:  Lovenox Disposition:  [facility]/Rehab 
        
___________________________________________________ Attending Physician: Lupis Hill MD

## 2018-07-17 NOTE — DISCHARGE SUMMARY
Physician Interim Discharge Summary     Patient ID:  Jessica Poole  [id number]  40 y.o.  1990    Admit date: 7/8/2018    Discharge date and time: TBD    Hospital Course: This is an interim summary and covers the hospitalization from 7/14to 7/17/2018. For any preceding portion of the patient's hospitalization, please see my partner's notes. Ms. James Ruelas is a 33 yo WF w/ hx of depression who presented with LLE numbness and weakness, urinary incontinence, found to have +aquaforin 4 ab. She did not respond to IV steroids and is currently on PLEX QOD x 5 sessions, started 7/14. She has not improved after 2 sessions, and placement at rehab after treatment is anticipated. See daily note for details. Final discharge summary will be done by the discharging physician.        Signed:  Mj Mccoy MD  7/17/2018  5:17 PM

## 2018-07-17 NOTE — PROGRESS NOTES
Bedside and Verbal shift change report given to Linda scruggs  (oncoming nurse) by Beth Acosta  (offgoing nurse). Report included the following information SBAR and Kardex.

## 2018-07-17 NOTE — PROGRESS NOTES
7/17/2018   CARE MANAGEMENT NOTE:  (cross coverage) CM reviewed EMR for clinical updates. Noted that pt will require plasmapheresis every other day x 5 - treatment #2 was received 7/16. Long range goal is inpt rehab at [address] [place] [address]. Will continue to follow pt's hospital progress and will assist with definitive discharge plan.   Olamide

## 2018-07-17 NOTE — PROCEDURES
[address]  *** FINAL REPORT ***    Name: Tony Webster  MRN: [id number]    Inpatient  : 30 Mar 1990  HIS Order #: [id number]  [address] [facility] Visit #: [id number]  Date: 2018    TYPE OF TEST: Peripheral Venous Testing    REASON FOR TEST  Limb swelling    Right Leg:-  Deep venous thrombosis:           No  Superficial venous thrombosis:    No  Deep venous insufficiency:        No  Superficial venous insufficiency: No    Left Leg:-  Deep venous thrombosis:           No  Superficial venous thrombosis:    No  Deep venous insufficiency:        No  Superficial venous insufficiency: No      INTERPRETATION/FINDINGS  PROCEDURE:  BILATERAL LE VENOUS DUPLEX. Evaluation of lower extremity veins with ultrasound (B-mode imaging,  pulsed Doppler, color Doppler). Includes the common femoral, deep  femoral, femoral, popliteal, posterior tibial, peroneal, and great  saphenous veins. FINDINGS:  Lisa Shannan scale and color flow duplex images of the veins in  both lower extremities demonstrate normal compressibility, spontaneous   and augmented flow profiles, and absence of filling defects  throughout the deep and superficial veins in both lower extremities. CONCLUSION: Bilateral lower extremity venous duplex negative for deep  venous thrombosis or thrombophlebitis. ADDITIONAL COMMENTS    I have personally reviewed the data relevant to the interpretation of  this  study. TECHNOLOGIST: DENIS Salcido  Signed: 2018 11:38 AM    PHYSICIAN: Tyson Chan.  Riley Reynoso MD  Signed: 2018 01:59 PM

## 2018-07-17 NOTE — PROGRESS NOTES
Palliative Medicine Consult    Patient Name: Jameel Garcia  YOB: 1990    Date of Initial Consult: 7/10/18  Reason for Consult: Overwhelming symptoms  Requesting Provider: Dr. Gopal Reza  Primary Care Physician: Hattie Aldridge NP      SUMMARY:   Jameel Garcia is a 34 y.o. with a past history of seizure d/o(not on meds), who was admitted on 7/8/2018 from home with a diagnosis of numbness in LE and back pain. Current medical issues leading to Palliative Medicine involvement include: overwhelming symptoms    Chart reviewed/HPI-patient with prior h/o of lower extremity weakness/numbness abput 7 years ago. She was admitted to [facility] but cause never known and she was discharged after 1 week in the hospital. During that time, she was receiving high doses of IV dilaudid to the point where her mom apparently weaned her off once she returned home. Patient was returning from the beach last Thursday and as she was getting into her car, her left leg \"gave out\" and had associated back pain. Thought maybe related to sleeping on different bed at the beach but over the weekend, symptoms progressed to include left leg numbness to right leg numbness and then numbness from the waist distally. In addition, she describes severe back pain. Workup to include MRI brain, cervical, thoracic and lumbar spine, LP, neurology evaluation have not been conclusive. There is concern about transverse myelitis and patient been started on high dose IV solumedrol. In addition, she has been placed on gabapentin 600 mg(dose increased earlier today by Neurology). Since being in the hospital, she has used ultram, zanaflex, percocet and toradol and states none have provided her any relief of her back pain. Patient with other issues to include urinary incontinence and constipation issues.       reviewed  Only 2 scripts in the last year->one for valium on 4/24/18 and 1 for percocet 5/325 on 9/7/17    SH-lives with her boyfriend, Denies any illicit drug use. No significant alcohol intake. Works at [employer] with [employer]. Mom is a nurse with [employer]:   1. Acute back pain, unclear etiology-both neuropathic and somatic pain  2. 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 Numbness in lower extremities, unclear cause-workup in progress  4. Debility         PLAN:   1. Met with patient and father. She finally rested last night. The IV dilaudid was very effective last night according to both of them. Has had a bowel movement   2. Pain management-remains a complicated situation since her positive antibody but other studies(MRI of the spine does not show any abnormalities consistent with neuromyelitis. Discussed situation with Dr. León Mark on 2/55 and we certainly agree the pain burden in these situations typically more neuropathic pain and the first 2 weeks tends to be the worse. Will continue dilaudid 1 mg IV every 4 hours prn severe pain(explained that 1 mg IV dilaudid=4 mg po dilaudid so this is double her current dosing) and continue po dilaudid 2 mg po every 4 hours prn moderate pain. At this point, she has used IV dilaudid x 1 dose last night and po dilaudid x 2 doses. She is trying not to use the IV dilaudid unless very severe pain. Will continue to monitor dosing closely. Will continue her lyrica 200 mg tid   3. Discussed with Dr. Chinedu Littlejohn and Dr. León Mark  4. Constipation-seems improved overall. Continue current bowel regiment  5. Discussed with bedside nurse(Amanda)  6. Initial consult note routed to primary continuity provider  7.  Communicated plan of care with: Palliative IDT       GOALS OF CARE / TREATMENT PREFERENCES:   [====Goals of Care====]  GOALS OF CARE:  Patient/Health Care Proxy Stated Goals: Rehabilitation      TREATMENT PREFERENCES:   Code Status: Full Code    Advance Care Planning:  Advance Care Planning 7/8/2018   Patient's Healthcare Decision Maker is: Legal Next of Kin   Confirm Advance Directive None       Medical Interventions: Full interventions  Other Instructions: The palliative care team has discussed with patient / health care proxy about goals of care / treatment preferences for patient.  [====Goals of Care====]         HISTORY:     History obtained from: chart, patient, father    CHIEF COMPLAINT: back pain    HPI/SUBJECTIVE:    The patient is:   [x] Verbal and participatory  [] Non-participatory due to:   Patient is alert and oriented. Definitely appears comfortable. Is tired. Concern about some LE edema-dopplers ordered by inpatient team.    Clinical Pain Assessment (nonverbal scale for severity on nonverbal patients):   Clinical Pain Assessment  Severity: 5  Location: lower back  Character: throbbing  Duration: days  Effect: difficult to ambulate  Factors: movement  Frequency: constant            FUNCTIONAL ASSESSMENT:     Palliative Performance Scale (PPS):  PPS: 50       PSYCHOSOCIAL/SPIRITUAL SCREENING:     Advance Care Planning:  Advance Care Planning 7/8/2018   Patient's Healthcare Decision Maker is: Legal Next of Kin   Confirm Advance Directive None        Any spiritual / Restorationist concerns:  [] Yes /  [x] No    Caregiver Burnout:  [] Yes /  [x] No /  [] No Caregiver Present      Anticipatory grief assessment:   [x] Normal  / [] Maladaptive       ESAS Anxiety: Anxiety: 1    ESAS Depression: Depression: 0        REVIEW OF SYSTEMS:     Positive and pertinent negative findings in ROS are noted above in HPI. The following systems were [x] reviewed / [] unable to be reviewed as noted in HPI  Other findings are noted below. Systems: constitutional, ears/nose/mouth/throat, respiratory, gastrointestinal, genitourinary, musculoskeletal, integumentary, neurologic, psychiatric, endocrine. Positive findings noted below.   Modified ESAS Completed by: provider   Fatigue: 1 Drowsiness: 0   Depression: 0 Pain: 5   Anxiety: 1 Nausea: 0   Anorexia: 0 Dyspnea: 0     Constipation: Yes     Stool Occurrence(s): 1        PHYSICAL EXAM:     From RN flowsheet:  Wt Readings from Last 3 Encounters:   07/17/18 250 lb 9.6 oz (113.7 kg)   04/27/18 251 lb (113.9 kg)   04/27/18 246 lb 14.6 oz (112 kg)     Blood pressure 117/74, pulse (!) 111, temperature 97.7 °F (36.5 °C), resp. rate 18, height 5' 6\" (1.676 m), weight 250 lb 9.6 oz (113.7 kg), SpO2 98 %, not currently breastfeeding. Pain Scale 1: Numeric (0 - 10)  Pain Intensity 1: 7  Pain Onset 1: acute/ongoing  Pain Location 1: Back  Pain Orientation 1: Lower  Pain Description 1: Constant  Pain Intervention(s) 1: Medication (see MAR)  Last bowel movement, if known:     General-NAD, calm  Lungs-CTA  CV-RRR  Neuro-numbness remains in the left leg with associated weakness, mild TTP lower back area     HISTORY:     Principal Problem:    [facility]romyel[facility]ica ([facility] (7/14/2018)    Active Problems:    Left leg weakness (7/8/2018)      Numbness in left leg (7/8/2018)      Seizures (HCC) ()      Nonintractable migraine (7/8/2018)      Urinary incontinence (7/9/2018)      Bradycardia, sinus (7/9/2018)      Obesity (BMI 30-39.9) (7/9/2018)      Neuromyelitis optica spectrum disorder ([facility] [address] (7/14/2018)      Past Medical History:   Diagnosis Date    Headache     Headache(784.0)     Neuromyelitis optica ([facility] [address] 7/14/2018    Obesity (BMI 30-39. 9) 7/9/2018    Seizures (HCC)       Past Surgical History:   Procedure Laterality Date    ENDOSCOPY, COLON, DIAGNOSTIC      HX APPENDECTOMY      HX CHOLECYSTECTOMY      HX GI      HX GYN        Family History   Problem Relation Age of Onset    Hypertension Father     Diabetes Maternal Grandmother     Coronary Artery Disease Maternal Grandfather     Diabetes Maternal Grandfather     Diabetes Paternal Grandfather       History reviewed, no pertinent family history.   Social History   Substance Use Topics    Smoking status: Current Every Day Smoker    Smokeless tobacco: Never Used    Alcohol use Yes     Allergies   Allergen Reactions    Codeine Other (comments)     Tachycardia    Sulfa (Sulfonamide Antibiotics) Itching    Bactrim [Sulfamethoprim Ds] Itching    Chocolate Flavor Other (comments)     Pt reports migraines    Clindamycin Other (comments)     Chest tightness    Compazine [Prochlorperazine Edisylate] Other (comments)     Neck and jaw spasm  And difficultly swallow     Morphine Hives    Peanut Other (comments)     migraines    Reglan [Metoclopramide] Nausea and Vomiting and Vertigo     Sleep disorder and shaking    Sulfur Swelling     Throat closes.     Zofran [Ondansetron Hcl (Pf)] Hives    Metaxalone Palpitations      Current Facility-Administered Medications   Medication Dose Route Frequency    nystatin (MYCOSTATIN) 100,000 unit/mL oral suspension 500,000 Units  500,000 Units Oral QID    pregabalin (LYRICA) capsule 200 mg  200 mg Oral TID    HYDROmorphone (PF) (DILAUDID) injection 1 mg  1 mg IntraVENous Q4H PRN    HYDROmorphone (DILAUDID) tablet 2 mg  2 mg Oral Q4H PRN    calcium gluconate 1 g in 0.9% sodium chloride 100 mL IVPB  1 g IntraVENous PRN    albumin human 5% (BUMINATE) solution 100 g  2,000 mL IntraVENous EVERY OTHER DAY    calcium gluconate 2 g in 0.9% sodium chloride 100 mL IVPB  2 g IntraVENous EVERY OTHER DAY    0.9% sodium chloride infusion 2,000 mL  2,000 mL IntraVENous EVERY OTHER DAY    anticoagulant citrate dextrose (ACD-A) solution  1,000 mL In Vitro EVERY OTHER DAY    lidocaine (XYLOCAINE) 10 mg/mL (1 %) injection 20 mL  20 mL SubCUTAneous Multiple    promethazine (PHENERGAN) 12.5 mg in 0.9% sodium chloride 50 mL IVPB  12.5 mg IntraVENous Q4H PRN    diphenhydrAMINE (BENADRYL) capsule 50 mg  50 mg Oral Q6H PRN    sorbitol 70 % solution 30 mL  30 mL Oral DAILY PRN    zolpidem (AMBIEN) tablet 5 mg  5 mg Oral QHS PRN    senna-docusate (PERICOLACE) 8.6-50 mg per tablet 1 Tab  1 Tab Oral BID    zolpidem (AMBIEN) tablet 5 mg  5 mg Oral QHS    FLUoxetine (PROzac) capsule 20 mg  20 mg Oral QHS    sodium chloride (NS) flush 5-10 mL  5-10 mL IntraVENous Q8H    sodium chloride (NS) flush 5-10 mL  5-10 mL IntraVENous PRN    acetaminophen (TYLENOL) tablet 650 mg  650 mg Oral Q4H PRN    enoxaparin (LOVENOX) injection 40 mg  40 mg SubCUTAneous Q24H    polyethylene glycol (MIRALAX) packet 17 g  17 g Oral BID          LAB AND IMAGING FINDINGS:     Lab Results   Component Value Date/Time    WBC 21.2 (H) 07/17/2018 06:43 AM    HGB 13.0 07/17/2018 06:43 AM    PLATELET 113 (L) 61/30/5077 06:43 AM     Lab Results   Component Value Date/Time    Sodium 141 07/17/2018 06:43 AM    Potassium 4.3 07/17/2018 06:43 AM    Chloride 106 07/17/2018 06:43 AM    CO2 25 07/17/2018 06:43 AM    BUN 8 07/17/2018 06:43 AM    Creatinine 0.83 07/17/2018 06:43 AM    Calcium 8.1 (L) 07/17/2018 06:43 AM    Magnesium 1.9 07/17/2018 06:43 AM    Phosphorus 5.9 (H) 07/17/2018 06:43 AM      Lab Results   Component Value Date/Time    AST (SGOT) 29 07/17/2018 06:43 AM    Alk. phosphatase 35 (L) 07/17/2018 06:43 AM    Protein, total 4.3 (L) 07/17/2018 06:43 AM    Albumin 2.9 (L) 07/17/2018 06:43 AM    Globulin 1.4 (L) 07/17/2018 06:43 AM     Lab Results   Component Value Date/Time    INR 1.1 07/17/2018 04:05 AM    Prothrombin time 10.9 07/17/2018 04:05 AM      No results found for: IRON, FE, TIBC, IBCT, PSAT, FERR   No results found for: PH, PCO2, PO2  No components found for: GLPOC   No results found for: CPK, CKMB             Total time:   Counseling / coordination time, spent as noted above:   > 50% counseling / coordination?:     Prolonged service was provided for  []30 min   []75 min in face to face time in the presence of the patient, spent as noted above. Time Start:   Time End:   Note: this can only be billed with 14579 (initial) or 60914 (follow up). If multiple start / stop times, list each separately.

## 2018-07-17 NOTE — PROGRESS NOTES
Gabino [facility] [address] YOB: 1990 Assessment & Plan:  
NMO · Pheresis every other day x 5 · Treatment #2 yesterday · Phenergan with pheresis · Check fibriniogen again in am. FFP with pheresis if still low. Subjective:  
CC: F/u NMO 
HPI: Stephy pheresis yest. Some fatigue and nausea. Fibrinogen is low Current Facility-Administered Medications Medication Dose Route Frequency  nystatin (MYCOSTATIN) 100,000 unit/mL oral suspension 500,000 Units  500,000 Units Oral QID  pregabalin (LYRICA) capsule 200 mg  200 mg Oral TID  
 HYDROmorphone (PF) (DILAUDID) injection 1 mg  1 mg IntraVENous Q4H PRN  
 HYDROmorphone (DILAUDID) tablet 2 mg  2 mg Oral Q4H PRN  
 calcium gluconate 1 g in 0.9% sodium chloride 100 mL IVPB  1 g IntraVENous PRN  
 albumin human 5% (BUMINATE) solution 100 g  2,000 mL IntraVENous EVERY OTHER DAY  calcium gluconate 2 g in 0.9% sodium chloride 100 mL IVPB  2 g IntraVENous EVERY OTHER DAY  
 0.9% sodium chloride infusion 2,000 mL  2,000 mL IntraVENous EVERY OTHER DAY  anticoagulant citrate dextrose (ACD-A) solution  1,000 mL In Vitro EVERY OTHER DAY  lidocaine (XYLOCAINE) 10 mg/mL (1 %) injection 20 mL  20 mL SubCUTAneous Multiple  promethazine (PHENERGAN) 12.5 mg in 0.9% sodium chloride 50 mL IVPB  12.5 mg IntraVENous Q4H PRN  
 diphenhydrAMINE (BENADRYL) capsule 50 mg  50 mg Oral Q6H PRN  
 sorbitol 70 % solution 30 mL  30 mL Oral DAILY PRN  
 zolpidem (AMBIEN) tablet 5 mg  5 mg Oral QHS PRN  
 senna-docusate (PERICOLACE) 8.6-50 mg per tablet 1 Tab  1 Tab Oral BID  zolpidem (AMBIEN) tablet 5 mg  5 mg Oral QHS  FLUoxetine (PROzac) capsule 20 mg  20 mg Oral QHS  sodium chloride (NS) flush 5-10 mL  5-10 mL IntraVENous Q8H  
 sodium chloride (NS) flush 5-10 mL  5-10 mL IntraVENous PRN  
 acetaminophen (TYLENOL) tablet 650 mg  650 mg Oral Q4H PRN  
 enoxaparin (LOVENOX) injection 40 mg  40 mg SubCUTAneous Q24H  polyethylene glycol (MIRALAX) packet 17 g  17 g Oral BID Objective:  
 
Vitals: 
Blood pressure 117/74, pulse (!) 111, temperature 97.7 °F (36.5 °C), resp. rate 18, height 5' 6\" (1.676 m), weight 113.7 kg (250 lb 9.6 oz), SpO2 98 %, not currently breastfeeding. Temp (24hrs), Av.9 °F (36.6 °C), Min:97.6 °F (36.4 °C), Max:98.4 °F (36.9 °C) Intake and Output: 
 07 - 1900 In: -  
Out: 850 [Urine:850] 07/15 1901 -  07 In: 5006 [P.O.:720; I.V.:431] Out: 1184 [Urine:1000] Physical Exam:               
GENERAL ASSESSMENT: NAD EXTREMITY: no EDEMA 
 
 
   
ECG/rhythm: 
 
Data Review No results for input(s): TNIPOC in the last 72 hours. No lab exists for component: ITNL No results for input(s): CPK, CKMB, TROIQ in the last 72 hours. Recent Labs  
   18 
 7277  18 
 5126 NA  141  139  
K  4.3  4.5  
CL  106  104 CO2  25  30 BUN  8  11 CREA  0.83  0.83 GLU  101*  83 PHOS  5.9*  3.5 MG  1.9  2.2 CA  8.1*  7.7* ALB  2.9*  2.9* WBC  21.2*  17.0*  
HGB  13.0  13.1 HCT  39.2  39.9 PLT  116*  127* Recent Labs  
   18 
 0405  18 
 0527  07/15/18 
 2075 INR  1.1  1.1  1.2* PTP  10.9  10.6  12.4* Needs: urine analysis, urine sodium, protein and creatinine No results found for: RACHEL THAKKAR 
 
 
 
: Emerson Thakkar MD 
2018 [facility]: 
[email or web address] [place] office: 
[address] [place] [address], [address] [place], [address] [facility] Phone: [phone] Fax :     [phone] [place] office: 
[address] [place] [address] Jesus Cotton Phone - [phone] Fax - [phone]

## 2018-07-17 NOTE — PROGRESS NOTES
Bedside and Verbal shift change report given to Linda scruggs  (oncoming nurse) by Luis Reagan  (offgoing nurse). Report included the following information SBAR and Kardex.

## 2018-07-17 NOTE — PROGRESS NOTES
Neurology Progress Note    Interval Hx:      Follow up: Acute onset left leg weakness, severe lower back pain, right leg tingling/ paresthesias, urinary retention/incontinence, generalized hyperreflexia. Subjective:  Pt continues with pain but says it is improving. She states she doesn't feel much better. She is walking with a walker. She is doing better in terms of bowel and bladder incontinence as well. She continues with some swelling in the legs today. Ultrasound was negative for DVT      Lab review:  So far CSF has returned normal (WBC 4, Protein 38, Gram stain negative to date, HSV PCR negative), other than few hundred RBCs which is likely d/t traumatic tap. MS panel neg. CRP, CBC normal (other than mildly elevated Abs Lymphocytes 3.9). Other labs: ISABELLA, Copper, anti-ds DNA, urine heavy metals, lyme antibodies, SSA, SSB, were all negative.   NMO antibody was positive at >6        Current Facility-Administered Medications:     nystatin (MYCOSTATIN) 100,000 unit/mL oral suspension 500,000 Units, 500,000 Units, Oral, QID, Janith Klinefelter, MD, 500,000 Units at 07/17/18 0940    pregabalin (LYRICA) capsule 200 mg, 200 mg, Oral, TID, Delfina Huff MD, 200 mg at 07/17/18 0934    HYDROmorphone (PF) (DILAUDID) injection 1 mg, 1 mg, IntraVENous, W2J PRN, Marilee Sagastume MD, 1 mg at 07/17/18 0940    HYDROmorphone (DILAUDID) tablet 2 mg, 2 mg, Oral, B2V PRN, Marilee Sagastume MD, 2 mg at 07/17/18 0453    calcium gluconate 1 g in 0.9% sodium chloride 100 mL IVPB, 1 g, IntraVENous, PRN, Veronique Baltazar MD    albumin human 5% (BUMINATE) solution 100 g, 2,000 mL, IntraVENous, EVERY OTHER DAY, Veronique Baltazar MD, 100 g at 07/16/18 1438    calcium gluconate 2 g in 0.9% sodium chloride 100 mL IVPB, 2 g, IntraVENous, EVERY OTHER DAY, Veronique Baltazar MD, Last Rate: 120 mL/hr at 07/16/18 1438, 2 g at 07/16/18 1438    0.9% sodium chloride infusion 2,000 mL, 2,000 mL, IntraVENous, EVERY OTHER DAY, Veronique Baltazar MD, 2,000 mL at 07/16/18 1439    anticoagulant citrate dextrose (ACD-A) solution, 1,000 mL, In Vitro, EVERY OTHER DAY, Alfonzo Mar MD, 1,000 mL at 07/16/18 1526    lidocaine (XYLOCAINE) 10 mg/mL (1 %) injection 20 mL, 20 mL, SubCUTAneous, Multiple, Jonatan Marin MD    promethazine (PHENERGAN) 12.5 mg in 0.9% sodium chloride 50 mL IVPB, 12.5 mg, IntraVENous, Q4H PRN, Nickolas BEAULIEU Do, MD, Last Rate: 200 mL/hr at 07/16/18 1438, 12.5 mg at 07/16/18 1438    diphenhydrAMINE (BENADRYL) capsule 50 mg, 50 mg, Oral, Q6H PRN, Ivette Powell MD, 50 mg at 07/16/18 1346    sorbitol 70 % solution 30 mL, 30 mL, Oral, DAILY PRN, Zayda Davis MD, 30 mL at 07/16/18 1007    zolpidem (AMBIEN) tablet 5 mg, 5 mg, Oral, QHS PRN, Ivette Powell MD, 5 mg at 07/15/18 2145    senna-docusate (PERICOLACE) 8.6-50 mg per tablet 1 Tab, 1 Tab, Oral, BID, Ivette Powell MD, 1 Tab at 07/17/18 0934    zolpidem (AMBIEN) tablet 5 mg, 5 mg, Oral, QHS, Ivette Powell MD, 5 mg at 07/16/18 2125    FLUoxetine (PROzac) capsule 20 mg, 20 mg, Oral, QHS, Ivette Powell MD, 20 mg at 07/16/18 2125    sodium chloride (NS) flush 5-10 mL, 5-10 mL, IntraVENous, Q8H, Miranda Pringle MD, 10 mL at 07/17/18 0521    sodium chloride (NS) flush 5-10 mL, 5-10 mL, IntraVENous, PRN, Miranda Pringle MD, 10 mL at 07/09/18 1400    acetaminophen (TYLENOL) tablet 650 mg, 650 mg, Oral, Q4H PRN, Miranda Pringle MD, 650 mg at 07/10/18 0658    enoxaparin (LOVENOX) injection 40 mg, 40 mg, SubCUTAneous, Q24H, Miranda Pringle MD, 40 mg at 07/16/18 1819    polyethylene glycol (MIRALAX) packet 17 g, 17 g, Oral, BID, Miranda Pringle MD, 17 g at 07/17/18 0934    Physical Exam    Patient Vitals for the past 12 hrs:   Temp Pulse Resp BP SpO2   07/17/18 0728 97.7 °F (36.5 °C) (!) 111 18 117/74 98 %   07/17/18 0709 - (!) 108 - - -   07/17/18 0427 98.4 °F (36.9 °C) (!) 126 16 124/78 98 %       Awake, resting in bed    Focused Neurological Exam   Extraocular movement intact bilaterally  Face appears symmetric  Hearing / Language intact to casual conversation    Sensory:   Left lower ext: absent pinprick/temp to level of T12  Right lower ext: intact LT, prick dec to mid thigh    Motor:   5/5 strength both arms  4/5 right leg  Left leg with at least 4/5 strength distally, 4/5 proximally in hip  Gait: not tested      Impression/ Plan    34 y.o. female with   Acute onset left leg weakness, severe lower back pain, right leg tingling/ paresthesias, urinary retention, generalized hyperreflexia    Repeat MRIs Brain, Cervical, Thoracic, Lumbar spine  (all with contrast) have not shown etiology (specifically no cord or CNS lesions, no spinal stenosis, no disc herniation). LP with minimal RBCs (not suggestive of SAH) and no evidence of infection or inflammation (normal WBCs, Protein, Glucose, Gram stain, HSV PCR). MS Panel was negative. However NMO was positive. 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 Pt has completed IV solumedrol so will now consult nephrology and initiate PLEX with a plan for 5 exchanges. She has completed 2/5. Today is day 3    Cont Lyrica to 200mg TID    Cont with palliative for pain management. Pt may need stronger treatment than oral meds are providing. However, this patient's primary and I are concerned by giving her IV opiates. Will defer to palliative regarding this. I do suspect patient significant neuropathic pain, but I am hopeful that the Lyrica will help with this. Discussed with Dr. Birgit Mobley. Bowels improving. Will monitor    Will cont to monitor heart rate with remote telemetry as treatment is initiated. Leg swelling: no sign of DVT, ?if PLEX is causing this at all, will try one dose of lasix today. Pt also developed thrush likely secondary to steroids. Primary to treat today.     Reviewed the diagnoses and treatment plans with patient in detail at the bedside    Will follow closely with you. Signed By: Vasiliy Kwan MD     7-18-18      Over 35 minutes was spent reviewing records, discussing with Dr. Myla Olvera and nursing, obtaining history, examining patient and discussing treatment plans.

## 2018-07-18 LAB
ALBUMIN SERPL-MCNC: 2.6 G/DL (ref 3.5–5)
ALBUMIN/GLOB SERPL: 1.4 {RATIO} (ref 1.1–2.2)
ALP SERPL-CCNC: 43 U/L (ref 45–117)
ALT SERPL-CCNC: 69 U/L (ref 12–78)
ANION GAP SERPL CALC-SCNC: 7 MMOL/L (ref 5–15)
AST SERPL-CCNC: 31 U/L (ref 15–37)
BASOPHILS # BLD: 0 K/UL (ref 0–0.1)
BASOPHILS NFR BLD: 0 % (ref 0–1)
BILIRUB SERPL-MCNC: 0.5 MG/DL (ref 0.2–1)
BUN SERPL-MCNC: 7 MG/DL (ref 6–20)
BUN/CREAT SERPL: 8 (ref 12–20)
CALCIUM SERPL-MCNC: 7.9 MG/DL (ref 8.5–10.1)
CHLORIDE SERPL-SCNC: 105 MMOL/L (ref 97–108)
CO2 SERPL-SCNC: 28 MMOL/L (ref 21–32)
CREAT SERPL-MCNC: 0.83 MG/DL (ref 0.55–1.02)
DIFFERENTIAL METHOD BLD: ABNORMAL
EOSINOPHIL # BLD: 0.7 K/UL (ref 0–0.4)
EOSINOPHIL NFR BLD: 4 % (ref 0–7)
ERYTHROCYTE [DISTWIDTH] IN BLOOD BY AUTOMATED COUNT: 13.2 % (ref 11.5–14.5)
FIBRINOGEN PPP-MCNC: 221 MG/DL (ref 200–475)
GLOBULIN SER CALC-MCNC: 1.9 G/DL (ref 2–4)
GLUCOSE SERPL-MCNC: 137 MG/DL (ref 65–100)
HCT VFR BLD AUTO: 35.8 % (ref 35–47)
HGB BLD-MCNC: 11.7 G/DL (ref 11.5–16)
IMM GRANULOCYTES # BLD: 0 K/UL
IMM GRANULOCYTES NFR BLD AUTO: 0 %
INR PPP: 1 (ref 0.9–1.1)
LYMPHOCYTES # BLD: 4 K/UL (ref 0.8–3.5)
LYMPHOCYTES NFR BLD: 23 % (ref 12–49)
MAGNESIUM SERPL-MCNC: 1.9 MG/DL (ref 1.6–2.4)
MCH RBC QN AUTO: 32 PG (ref 26–34)
MCHC RBC AUTO-ENTMCNC: 32.7 G/DL (ref 30–36.5)
MCV RBC AUTO: 97.8 FL (ref 80–99)
MONOCYTES # BLD: 1.2 K/UL (ref 0–1)
MONOCYTES NFR BLD: 7 % (ref 5–13)
NEUTS SEG # BLD: 11.6 K/UL (ref 1.8–8)
NEUTS SEG NFR BLD: 66 % (ref 32–75)
NRBC # BLD: 0 K/UL (ref 0–0.01)
NRBC BLD-RTO: 0 PER 100 WBC
PHOSPHATE SERPL-MCNC: 4.4 MG/DL (ref 2.6–4.7)
PLATELET # BLD AUTO: 110 K/UL (ref 150–400)
PMV BLD AUTO: 10.8 FL (ref 8.9–12.9)
POTASSIUM SERPL-SCNC: 3.9 MMOL/L (ref 3.5–5.1)
PROT SERPL-MCNC: 4.5 G/DL (ref 6.4–8.2)
PROTHROMBIN TIME: 9.9 SEC (ref 9–11.1)
RBC # BLD AUTO: 3.66 M/UL (ref 3.8–5.2)
RBC MORPH BLD: ABNORMAL
SODIUM SERPL-SCNC: 140 MMOL/L (ref 136–145)
WBC # BLD AUTO: 17.5 K/UL (ref 3.6–11)
WBC MORPH BLD: ABNORMAL

## 2018-07-18 PROCEDURE — 74011250637 HC RX REV CODE- 250/637: Performed by: INTERNAL MEDICINE

## 2018-07-18 PROCEDURE — 74011250636 HC RX REV CODE- 250/636: Performed by: INTERNAL MEDICINE

## 2018-07-18 PROCEDURE — 77030038269 HC DRN EXT URIN PURWCK BARD -A

## 2018-07-18 PROCEDURE — P9045 ALBUMIN (HUMAN), 5%, 250 ML: HCPCS | Performed by: INTERNAL MEDICINE

## 2018-07-18 PROCEDURE — 74011000258 HC RX REV CODE- 258: Performed by: INTERNAL MEDICINE

## 2018-07-18 PROCEDURE — 80053 COMPREHEN METABOLIC PANEL: CPT | Performed by: INTERNAL MEDICINE

## 2018-07-18 PROCEDURE — 85610 PROTHROMBIN TIME: CPT | Performed by: INTERNAL MEDICINE

## 2018-07-18 PROCEDURE — 74011250637 HC RX REV CODE- 250/637: Performed by: PSYCHIATRY & NEUROLOGY

## 2018-07-18 PROCEDURE — 97116 GAIT TRAINING THERAPY: CPT

## 2018-07-18 PROCEDURE — 85025 COMPLETE CBC W/AUTO DIFF WBC: CPT | Performed by: INTERNAL MEDICINE

## 2018-07-18 PROCEDURE — 74011000250 HC RX REV CODE- 250: Performed by: INTERNAL MEDICINE

## 2018-07-18 PROCEDURE — 36514 APHERESIS PLASMA: CPT

## 2018-07-18 PROCEDURE — 84100 ASSAY OF PHOSPHORUS: CPT | Performed by: INTERNAL MEDICINE

## 2018-07-18 PROCEDURE — 74011250636 HC RX REV CODE- 250/636: Performed by: FAMILY MEDICINE

## 2018-07-18 PROCEDURE — 65660000000 HC RM CCU STEPDOWN

## 2018-07-18 PROCEDURE — 97530 THERAPEUTIC ACTIVITIES: CPT

## 2018-07-18 PROCEDURE — 85384 FIBRINOGEN ACTIVITY: CPT | Performed by: INTERNAL MEDICINE

## 2018-07-18 PROCEDURE — 83735 ASSAY OF MAGNESIUM: CPT | Performed by: INTERNAL MEDICINE

## 2018-07-18 PROCEDURE — 97535 SELF CARE MNGMENT TRAINING: CPT

## 2018-07-18 PROCEDURE — 36415 COLL VENOUS BLD VENIPUNCTURE: CPT | Performed by: INTERNAL MEDICINE

## 2018-07-18 RX ORDER — FUROSEMIDE 20 MG/1
20 TABLET ORAL ONCE
Status: COMPLETED | OUTPATIENT
Start: 2018-07-18 | End: 2018-07-18

## 2018-07-18 RX ADMIN — Medication 10 ML: at 21:22

## 2018-07-18 RX ADMIN — ENOXAPARIN SODIUM 40 MG: 40 INJECTION SUBCUTANEOUS at 17:20

## 2018-07-18 RX ADMIN — HYDROMORPHONE HYDROCHLORIDE 1 MG: 2 INJECTION, SOLUTION INTRAMUSCULAR; INTRAVENOUS; SUBCUTANEOUS at 05:38

## 2018-07-18 RX ADMIN — DIPHENHYDRAMINE HYDROCHLORIDE 50 MG: 25 CAPSULE ORAL at 07:20

## 2018-07-18 RX ADMIN — PROMETHAZINE HYDROCHLORIDE 12.5 MG: 25 INJECTION INTRAMUSCULAR; INTRAVENOUS at 19:52

## 2018-07-18 RX ADMIN — FLUOXETINE 20 MG: 20 CAPSULE ORAL at 21:21

## 2018-07-18 RX ADMIN — Medication 10 ML: at 14:57

## 2018-07-18 RX ADMIN — ANTICOAGULANT CITRATE DEXTROSE SOLUTION FORMULA A 1000 ML: 12.25; 11; 3.65 SOLUTION INTRAVENOUS at 19:00

## 2018-07-18 RX ADMIN — HYDROMORPHONE HYDROCHLORIDE 1 MG: 2 INJECTION, SOLUTION INTRAMUSCULAR; INTRAVENOUS; SUBCUTANEOUS at 22:57

## 2018-07-18 RX ADMIN — POLYETHYLENE GLYCOL (3350) 17 G: 17 POWDER, FOR SOLUTION ORAL at 10:07

## 2018-07-18 RX ADMIN — PREGABALIN 200 MG: 100 CAPSULE ORAL at 10:06

## 2018-07-18 RX ADMIN — FUROSEMIDE 20 MG: 20 TABLET ORAL at 12:57

## 2018-07-18 RX ADMIN — NYSTATIN 500000 UNITS: 100000 SUSPENSION ORAL at 12:57

## 2018-07-18 RX ADMIN — HYDROMORPHONE HYDROCHLORIDE 1 MG: 2 INJECTION, SOLUTION INTRAMUSCULAR; INTRAVENOUS; SUBCUTANEOUS at 14:51

## 2018-07-18 RX ADMIN — PREGABALIN 200 MG: 100 CAPSULE ORAL at 21:21

## 2018-07-18 RX ADMIN — SENNOSIDES AND DOCUSATE SODIUM 1 TABLET: 8.6; 5 TABLET ORAL at 10:06

## 2018-07-18 RX ADMIN — NYSTATIN 500000 UNITS: 100000 SUSPENSION ORAL at 17:20

## 2018-07-18 RX ADMIN — NYSTATIN 500000 UNITS: 100000 SUSPENSION ORAL at 21:21

## 2018-07-18 RX ADMIN — POLYETHYLENE GLYCOL (3350) 17 G: 17 POWDER, FOR SOLUTION ORAL at 17:20

## 2018-07-18 RX ADMIN — DIPHENHYDRAMINE HYDROCHLORIDE 50 MG: 25 CAPSULE ORAL at 18:59

## 2018-07-18 RX ADMIN — SODIUM CHLORIDE 2000 ML: 900 INJECTION, SOLUTION INTRAVENOUS at 19:12

## 2018-07-18 RX ADMIN — HYDROMORPHONE HYDROCHLORIDE 1 MG: 2 INJECTION, SOLUTION INTRAMUSCULAR; INTRAVENOUS; SUBCUTANEOUS at 10:07

## 2018-07-18 RX ADMIN — NYSTATIN 500000 UNITS: 100000 SUSPENSION ORAL at 10:06

## 2018-07-18 RX ADMIN — SENNOSIDES AND DOCUSATE SODIUM 1 TABLET: 8.6; 5 TABLET ORAL at 17:20

## 2018-07-18 RX ADMIN — Medication 10 ML: at 05:38

## 2018-07-18 RX ADMIN — CALCIUM GLUCONATE 2 G: 94 INJECTION, SOLUTION INTRAVENOUS at 19:13

## 2018-07-18 RX ADMIN — HYDROMORPHONE HYDROCHLORIDE 1 MG: 2 INJECTION, SOLUTION INTRAMUSCULAR; INTRAVENOUS; SUBCUTANEOUS at 01:27

## 2018-07-18 RX ADMIN — ZOLPIDEM TARTRATE 5 MG: 5 TABLET ORAL at 21:21

## 2018-07-18 RX ADMIN — HYDROMORPHONE HYDROCHLORIDE 1 MG: 2 INJECTION, SOLUTION INTRAMUSCULAR; INTRAVENOUS; SUBCUTANEOUS at 18:59

## 2018-07-18 RX ADMIN — ALBUMIN (HUMAN) 100 G: 12.5 INJECTION, SOLUTION INTRAVENOUS at 19:13

## 2018-07-18 RX ADMIN — PREGABALIN 200 MG: 100 CAPSULE ORAL at 16:20

## 2018-07-18 NOTE — PROGRESS NOTES
Gabino Higuera [facility] [address]  [address] [facility], [address] [place] [address]  [phone]      Medical Progress Note      NAME: Eden Dickerson   :  1990  MRM:  [id number]    Date/Time: 2018  9:43 AM       Assessment and Plan:   1. LT leg weakness/ numbness LT leg/ lower back pain. 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 Initially treated with solumedrol and now getting 3rd dose of plasmapheresis( total of 5 doses every other day). MRI repeated with contrast still showed no lesions. Continue PT/OT. May need rehab on discharged. symptomatic management. Discussed with neurology. Palliative care managing pain.        2. Thrush. On Nystatin swish and swallow     3.  Seizures ([address] (): no recent SZ. Not on AED. Follow up with neurology     4. Nonintractable migraine: post-LP HA resolved after blood patch.     5.  Urinary incontinence: due to ? NMO. Purewick.       6. Thrombocytopenia / leukocytosis: may be related to plasmapheresis. Monitor CBC.       7.  Obesity (BMI 30-39. 9): would benefit from weight loss and dietary / lifestyle modifications     8. Constipation: cont bowel regimen. KUB unremarkable. Had BMs      9. LE edema: mild. No DVT on dopplers            Subjective:     Chief Complaint:  Follow up of pt who was admitted with LT leg weakness and back pain. Still c/o back pain and weakness of LT leg    ROS:  (bold if positive, if negative)      Tolerating PT  Tolerating Diet        Objective:     Last 24hrs VS reviewed since prior progress note.  Most recent are:    Visit Vitals    /78 (BP 1 Location: Right arm, BP Patient Position: At rest)    Pulse (!) 102    Temp 98 °F (36.7 °C)    Resp 18    Ht 5' 6\" (1.676 m)    Wt 113.7 kg (250 lb 9.6 oz)    SpO2 97%    Breastfeeding No    BMI 40.45 kg/m2     SpO2 Readings from Last 6 Encounters:   18 97%   18 99%   18 96%   10/30/17 97%   10/21/16 98%   10/21/16 96%          Intake/Output Summary (Last 24 hours) at 07/18/18 0980  Last data filed at 07/18/18 0222   Gross per 24 hour   Intake              800 ml   Output             2050 ml   Net            -1250 ml        Physical Exam:    Gen:  Well-developed, well-nourished, in no acute distress  HEENT:  Pink conjunctivae, PERRL, hearing intact to voice, moist mucous membranes  Neck:  Supple, without masses, thyroid non-tender  Resp:  No accessory muscle use, clear breath sounds without wheezes rales or rhonchi  Card:  No murmurs, normal S1, S2 without thrills, bruits or peripheral edema  Abd:  Soft, non-tender, non-distended, normoactive bowel sounds are present, no palpable organomegaly and no detectable hernias  Lymph:  No cervical or inguinal adenopathy  Musc:  No cyanosis or clubbing  Skin:  No rashes or ulcers, skin turgor is good  Neuro:  Cranial nerves are grossly intact, LT leg weakness   Psych:  Good insight, oriented to person, place and time, alert  __________________________________________________________________  Medications Reviewed: (see below)  Medications:     Current Facility-Administered Medications   Medication Dose Route Frequency    nystatin (MYCOSTATIN) 100,000 unit/mL oral suspension 500,000 Units  500,000 Units Oral QID    pregabalin (LYRICA) capsule 200 mg  200 mg Oral TID    HYDROmorphone (PF) (DILAUDID) injection 1 mg  1 mg IntraVENous Q4H PRN    HYDROmorphone (DILAUDID) tablet 2 mg  2 mg Oral Q4H PRN    calcium gluconate 1 g in 0.9% sodium chloride 100 mL IVPB  1 g IntraVENous PRN    albumin human 5% (BUMINATE) solution 100 g  2,000 mL IntraVENous EVERY OTHER DAY    calcium gluconate 2 g in 0.9% sodium chloride 100 mL IVPB  2 g IntraVENous EVERY OTHER DAY    0.9% sodium chloride infusion 2,000 mL  2,000 mL IntraVENous EVERY OTHER DAY    anticoagulant citrate dextrose (ACD-A) solution  1,000 mL In Vitro EVERY OTHER DAY    lidocaine (XYLOCAINE) 10 mg/mL (1 %) injection 20 mL  20 mL SubCUTAneous Multiple    promethazine (PHENERGAN) 12.5 mg in 0.9% sodium chloride 50 mL IVPB  12.5 mg IntraVENous Q4H PRN    diphenhydrAMINE (BENADRYL) capsule 50 mg  50 mg Oral Q6H PRN    sorbitol 70 % solution 30 mL  30 mL Oral DAILY PRN    zolpidem (AMBIEN) tablet 5 mg  5 mg Oral QHS PRN    senna-docusate (PERICOLACE) 8.6-50 mg per tablet 1 Tab  1 Tab Oral BID    zolpidem (AMBIEN) tablet 5 mg  5 mg Oral QHS    FLUoxetine (PROzac) capsule 20 mg  20 mg Oral QHS    sodium chloride (NS) flush 5-10 mL  5-10 mL IntraVENous Q8H    sodium chloride (NS) flush 5-10 mL  5-10 mL IntraVENous PRN    acetaminophen (TYLENOL) tablet 650 mg  650 mg Oral Q4H PRN    enoxaparin (LOVENOX) injection 40 mg  40 mg SubCUTAneous Q24H    polyethylene glycol (MIRALAX) packet 17 g  17 g Oral BID        Lab Data Reviewed: (see below)  Lab Review:     Recent Labs      07/18/18   0246  07/17/18   0643  07/16/18   0527   WBC  17.5*  21.2*  17.0*   HGB  11.7  13.0  13.1   HCT  35.8  39.2  39.9   PLT  110*  116*  127*     Recent Labs      07/18/18   0246  07/17/18   0643  07/17/18   0405  07/16/18   0527   NA  140  141   --   139   K  3.9  4.3   --   4.5   CL  105  106   --   104   CO2  28  25   --   30   GLU  137*  101*   --   83   BUN  7  8   --   11   CREA  0.83  0.83   --   0.83   CA  7.9*  8.1*   --   7.7*   MG  1.9  1.9   --   2.2   PHOS  4.4  5.9*   --   3.5   ALB  2.6*  2.9*   --   2.9*   TBILI  0.5  0.5   --   0.6   SGOT  31  29   --   16   ALT  69  60   --   40   INR  1.0   --   1.1  1.1     Lab Results   Component Value Date/Time    Glucose (POC) 96 06/20/2012 01:55 AM    Glucose (POC) 93 05/17/2012 04:56 PM     No results for input(s): PH, PCO2, PO2, HCO3, FIO2 in the last 72 hours.   Recent Labs      07/18/18   0246  07/17/18   0405  07/16/18   0527   INR  1.0  1.1  1.1     All Micro Results     Procedure Component Value Units Date/Time    CULTURE, CSF  TUBE 2 [[id number]] Collected:  07/09/18 1636    Order Status:  Completed Specimen:  Cerebrospinal Fluid Updated:  07/16/18 0949     Special Requests:         Culture performed on Unspun Fluid     GRAM STAIN NO ORGANISMS SEEN         FEW WBCS SEEN                 Smear Reviewed by Microbiology     Culture result:         Culture performed on Unspun Fluid              NO GROWTH ON SOLID MEDIA AT 4 DAYS              NO GROWTH IN THIO BROTH AT 7 DAYS    CULTURE, URINE [[id number]] Collected:  07/08/18 1500    Order Status:  Completed Specimen:  Urine from [place] Specimen Updated:  07/09/18 2030     Special Requests: NO SPECIAL REQUESTS        [place] Count <1,000 CFU/ML        Culture result: NO GROWTH 1 DAY       HSV BY PCR [[id number]] Collected:  07/09/18 1637    Order Status:  Canceled Specimen:  Other Updated:  07/09/18 4181          I have reviewed notes of prior 24hr. Other pertinent lab:       Total time spent with patient: [address] discussed with: Patient, Family, Nursing Staff, Consultant/Specialist and >50% of time spent in counseling and coordination of care    Discussed:  Care Plan    Prophylaxis:  Lovenox    Disposition:  SAH/Rehab           ___________________________________________________    Attending Physician: Zohaib Peguero MD

## 2018-07-18 NOTE — ROUTINE PROCESS
Bedside and Verbal shift change report given to Cherri Copeland RN (oncoming nurse) by Dolly Mena RN (offgoing nurse). Report included the following information SBAR, Kardex, Intake/Output, MAR, Recent Results and Med Rec Status.

## 2018-07-18 NOTE — PROGRESS NOTES
Problem: Mobility Impaired (Adult and Pediatric)  Goal: *Acute Goals and Plan of Care (Insert Text)  Physical Therapy Goals  Initiated 7/10/2018 Re-evaluation 7/19/2018 - no goals achieved - all goals remain appropriate  1. Patient will move from supine to sit and sit to supine  in bed with modified independence within 7 day(s). 2.  Patient will transfer from bed to chair and chair to bed with modified independence using the least restrictive device within 7 day(s). 3.  Patient will perform sit to stand with supervision/set-up within 7 day(s). 4.  Patient will ambulate with minimal assistance/contact guard assist for 150 feet with the least restrictive device within 7 day(s). 5.  Patient will ascend/descend 4 stairs with 2 handrail(s) with minimal assistance/contact guard assist within 7 day(s). physical Therapy REEVALUATION  Patient: Shawn Baires (15 y.o. female)  Date: 7/18/2018  Primary Diagnosis: Left leg weakness  Left leg weakness  epidural blood patch  Neuromyelitis optica spectrum disorder (HCC)  Procedure(s) (LRB):  epidural blood patch (N/A) 7 Days Post-Op   Precautions:   Back, Fall (L knee buckling)  Chart, physical therapy assessment, plan of care and goals were reviewed. ASSESSMENT :  Based on the objective data described below, the patient presents with lower back pain 0/10 and anxiety about knee buckling and falling. Pt has decreased but functional B LE strength with L LE weaker than R and dense L LE numbness. Pt was eager to go and sit outside on the 4th floor balcony which had been approved by the doctor. Pt sat edge of bed with log roll and contact guard assist and stood with rolling walker and minimal assist. Pt ambulated about 90 feet with rolling walker and contact guard assist and verbal cues for L quad control. Pt was followed by a wheelchair. No knee buckling was noted. Pt is anxious and has decreased endurance.  Pt is due for pheresis later today and Pt states she is exhausted for about 24 hours after this. Pt was instructed in B LE exercises and encouraged to continue with these. Pt's dad took Pt outside in a wheelchair. Patient will benefit from skilled intervention to address the above impairments. Patients rehabilitation potential is considered to be Fair  Factors which may influence rehabilitation potential include:   []           None noted  []           Mental ability/status  [x]           Medical condition  []           Home/family situation and support systems  []           Safety awareness  [x]           Pain tolerance/management  []           Other:      PLAN :  Recommendations and Planned Interventions:  [x]             Bed Mobility Training             [x]      Neuromuscular Re-Education  [x]             Transfer Training                   []      Orthotic/Prosthetic Training  [x]             Gait Training                         []      Modalities  [x]             Therapeutic Exercises           []      Edema Management/Control  [x]             Therapeutic Activities            [x]      Patient and Family Training/Education  []             Other (comment):  Frequency/Duration: Patient will be followed by physical therapy 5 times a week to address goals. Discharge Recommendations: Inpatient Rehab  Further Equipment Recommendations for Discharge: no change     SUBJECTIVE:   Patient stated I want to go outside.     OBJECTIVE DATA SUMMARY:     Past Medical History:   Diagnosis Date    Headache     Headache(784.0)     Neuromyelitis optica ([facility] [address] 7/14/2018    Obesity (BMI 30-39. 9) 7/9/2018    Seizures ([facility] [address]      Past Surgical History:   Procedure Laterality Date    ENDOSCOPY, COLON, DIAGNOSTIC      HX APPENDECTOMY      HX CHOLECYSTECTOMY      HX GI      [facility] course since last seen and reason for reevaluation: plasmaphoresis treatment  Critical Behavior:  Neurologic State: Alert  Orientation Level: Oriented X4  Cognition: Appropriate decision making, Follows commands  Safety/Judgement: Awareness of environment  Skin:  intact  Strength:    Strength: Generally decreased, functional (L LE weaker than R; L DF limited)                      Tone & Sensation:   Tone: Normal              Sensation: Impaired (L LE numb, R LE dulled/tingling)               Range Of Motion:  AROM: Generally decreased, functional (L LE more limited than R)           PROM: Generally decreased, functional           Coordination:  Coordination: Generally decreased, functional    Functional Mobility:  Bed Mobility:  Rolling: Independent  Supine to Sit: Independent (Simultaneous filing. User may not have seen previous data.)  Sit to Supine: Stand-by assistance  Scooting: Supervision  Transfers:  Sit to Stand: Assist x1;Contact guard assistance (Simultaneous filing. User may not have seen previous data.)  Stand to Sit: Assist x1;Contact guard assistance                    Balance:   Sitting: Intact (Simultaneous filing. User may not have seen previous data.)  Standing: Impaired (Simultaneous filing. User may not have seen previous data.)  Standing - Static: Fair (Simultaneous filing. User may not have seen previous data.)  Standing - Dynamic : Fair (Simultaneous filing. User may not have seen previous data.)  Ambulation/Gait Training:  Distance (ft): 90 Feet (ft)  Assistive Device: Gait belt;Walker, rolling  Ambulation - Level of Assistance: Assist x1;Contact guard assistance        Gait Abnormalities: Antalgic;Decreased step clearance        Base of Support: Narrowed; Shift to right  Stance: Left decreased  Speed/Annetta: Pace decreased (<100 feet/min)                       Therapeutic Exercises:   Instructed in ankle pumps and heel glides and encouraged to exercise hourly    Functional Measure:  Barthel Index:    Bathin  Bladder: 0  Bowels: 0  Groomin  Dressin  Feeding: 10  Mobility: 0  Stairs: 0  Toilet Use: 5  Transfer (Bed to Chair and Back): 10  Total: 35       Barthel and G-code impairment scale:  Percentage of impairment CH  0% CI  1-19% CJ  20-39% CK  40-59% CL  60-79% CM  80-99% CN  100%   Barthel Score 0-100 100 99-80 79-60 59-40 20-39 1-19   0   Barthel Score 0-20 20 17-19 13-16 9-12 5-8 1-4 0      The Barthel ADL Index: Guidelines  1. The index should be used as a record of what a patient does, not as a record of what a patient could do. 2. The main aim is to establish degree of independence from any help, physical or verbal, however minor and for whatever reason. 3. The need for supervision renders the patient not independent. 4. A patient's performance should be established using the best available evidence. Asking the patient, friends/relatives and nurses are the usual sources, but direct observation and common sense are also important. However direct testing is not needed. 5. Usually the patient's performance over the preceding 24-48 hours is important, but occasionally longer periods will be relevant. 6. Middle categories imply that the patient supplies over 50 per cent of the effort. 7. Use of aids to be independent is allowed. Salma Medina., Barthel, D.W. (9651). Functional evaluation: the Barthel Index. [facility] (14)2. MARIANNE Sadler, Gianluca Kapoor., Aranza Mobley., [place], [address] [place] [address] (1999). Measuring the change indisability after inpatient rehabilitation; comparison of the responsiveness of the Barthel Index and Functional [place] Measure. Journal of Neurology, Neurosurgery, and Psychiatry, 66(4), 417-015. Kaplan Arms, N.J.A, MELIZA Herrera, & Avis Dhaliwal, M.A. (2004.) Assessment of post-stroke quality of life in cost-effectiveness studies: The usefulness of the Barthel Index and the EuroQoL-5D. Quality of Life Research, 13, 161-41       G codes: In compliance with CMSs Claims Based Outcome Reporting, the following G-code set was chosen for this patient based on their primary functional limitation being treated:     The outcome measure chosen to determine the severity of the functional limitation was the Barthel Index with a score of 35/100 which was correlated with the impairment scale. ? Mobility - Walking and Moving Around:     - CURRENT STATUS: CL - 60%-79% impaired, limited or restricted    - GOAL STATUS: CK - 40%-59% impaired, limited or restricted    - D/C STATUS:  ---------------To be determined---------------     Pain:  Pain Scale 1: Numeric (0 - 10)  Pain Intensity 1: 9  Pain Location 1: Back  Pain Orientation 1: Lower  Pain Description 1: Constant; Aching  Pain Intervention(s) 1: Medication (see MAR); Emotional support  Activity Tolerance:   fair  Please refer to the flowsheet for vital signs taken during this treatment. After treatment:   [x]  Patient left in no apparent distress sitting up in chair  []  Patient left in no apparent distress in bed  []  Call bell left within reach  [x]  Nursing notified  [x]  Caregiver present  []  Bed alarm activated    COMMUNICATION/EDUCATION:   The patients plan of care was discussed with: Registered Nurse. [x]  Fall prevention education was provided and the patient/caregiver indicated understanding. [x]  Patient/family have participated as able in goal setting and plan of care. [x]  Patient/family agree to work toward stated goals and plan of care. []  Patient understands intent and goals of therapy, but is neutral about his/her participation. []  Patient is unable to participate in goal setting and plan of care.     Thank you for this referral.  Ifeoma Phillip, PT   Time Calculation: 24 mins

## 2018-07-18 NOTE — PROGRESS NOTES
Problem: Self Care Deficits Care Plan (Adult)  Goal: *Acute Goals and Plan of Care (Insert Text)  Occupational Therapy Goals  7/18/2018- weekly re assessment  1. Patient will perform ADLs sitting EOB 5 mins without reports of pain supervision/setup within 7 day(s). 2.  Patient will perform lower body ADLs with minimal assistance/contact guard assist within 7 day(s). 3.  Patient will perform bathing with minimal assistance/contact guard assist within 7 day(s). 4.  Patient will perform toilet transfers with minimal assistance/contact guard assist and least restrictive device within 7 day(s). 5.  Patient will perform all aspects of toileting with supervision/set-up within 7 day(s). 6.  Patient will participate in upper extremity therapeutic exercise/activities to increase independence with ADLs with independence for 5 minutes within 7 day(s). 7.  Patient will verbalize/demonstrate 3/3 back precautions during ADL tasks without cues within 7 days. Initiated 7/10/2018  1. Patient will perform ADLs sitting EOB 5 mins without reports of pain supervision/setup within 7 day(s). progressing  2. Patient will perform lower body ADLs with minimal assistance/contact guard assist within 7 day(s). - progressing  3. Patient will perform bathing with minimal assistance/contact guard assist within 7 day(s). - progressing  4. Patient will perform toilet transfers with minimal assistance/contact guard assist x2 and least restrictive device within 7 day(s). - met  5. Patient will perform all aspects of toileting with supervision/set-up within 7 day(s). - progressing  6. Patient will participate in upper extremity therapeutic exercise/activities to increase independence with ADLs with independence for 5 minutes within 7 day(s). - progressing  7. Patient will verbalize/demonstrate 3/3 back precautions during ADL tasks without cues within 7 days.  - progressing       Occupational Therapy TREATMENT: WEEKLY REASSESSMENT  Patient: Griselda Shed (56 y.o. female)  Date: 7/18/2018  Diagnosis: Left leg weakness  Left leg weakness  epidural blood patch  Neuromyelitis optica spectrum disorder (HCC) Neuromyelitis optica (HCC)  Procedure(s) (LRB):  epidural blood patch (N/A) 7 Days Post-Op  Precautions: Back, Fall (L knee buckling)  Chart, occupational therapy assessment, plan of care, and goals were reviewed. ASSESSMENT:  Patient received supine in bed, agreeable to OT . Patient able to move to EOB with supervision. Once sitting, patient with attempt to don socks but reports increased LE flexion increases back pain. Patient with good attempt for cross leg technique today. Patient has been educated regarding use of AE but attempted without today per preference/hopeful to not require soon. Patient able to perform sit to stand with min assist and transferred to commode in bathroom with min assist.  Patient noted incontinence of urine to protective brief and agreeable to cristiana area bathing after toileting. Patient reports inability to detect need to void, but upon sitting able to void into commode. Patient performed her own hygiene and bathing with stand by assist in sitting. She requires min assist x 1 for sit to stand from commode and min assist to stand at sink for light UE bathing and hand hygiene. Patient returned to EOB and reports fatigue and returned to supine with supervision. Patient progressing with goals but goals remain appropriate for patient at this time. Will continue to follow. She remains a good candidate for rehab at discharge. Progression toward goals:  []            Improving appropriately and progressing toward goals  [x]            Improving slowly and progressing toward goals  []            Not making progress toward goals and plan of care will be adjusted     PLAN:  Goals have been updated based on progression since last assessment.   Patient continues to benefit from skilled intervention to address the above impairments. Continue to follow patient 5 times a week to address goals. Planned Interventions:  [x]                    Self Care Training                  [x]             Therapeutic Activities  [x]                    Functional Mobility Training    []             Cognitive Retraining  [x]                    Therapeutic Exercises           [x]             Endurance Activities  [x]                    Balance Training                   []             Neuromuscular Re-Education  []                    Visual/Perceptual Training     [x]        Home Safety Training  [x]                    Patient Education                 [x]             Family Training/Education  []                    Other (comment):  Discharge Recommendations: Rehab  Further Equipment Recommendations for Discharge: TBD     SUBJECTIVE:   Patient stated I can't tell at all when I have to go.     OBJECTIVE DATA SUMMARY:   Cognitive/Behavioral Status:  Neurologic State: Alert  Orientation Level: Oriented X4  Cognition: Appropriate decision making; Follows commands  Perception: Appears intact  Perseveration: No perseveration noted  Safety/Judgement: Awareness of environment    Functional Mobility and Transfers for ADLs:  Bed Mobility:  Supine to Sit: Stand-by assistance  Sit to Supine: Stand-by assistance  Scooting: Supervision    Transfers:  Sit to Stand: Minimum assistance;Assist x1  Functional Transfers  Bathroom Mobility: Minimum assistance  Toilet Transfer : Minimum assistance; Additional time  Cues: Verbal cues provided;Physical assistance        Balance:  Sitting: Intact  Standing: Impaired; With support  Standing - Static: Constant support  Standing - Dynamic : Fair    ADL Intervention:       Grooming  Grooming Assistance: Minimum assistance  Washing Hands: Minimum assistance (standing balance at sink)    Upper Body Bathing  Bathing Assistance: Stand-by assistance  Position Performed:  (seated on commode)  Cues: Verbal cues provided    Lower Body Bathing  Bathing Assistance: Stand-by assistance  Perineal  : Stand-by assistance  Position Performed: Seated on toilet         Lower Body Dressing Assistance  Protective Undergarmet: Moderate assistance  Pants With Elastic Waist: Moderate assistance  Socks: Moderate assistance  Leg Crossed Method Used:  (attempted )  Position Performed: Seated edge of bed  Cues: Physical assistance;Verbal cues provided  Adaptive Equipment Used: Walker    Toileting  Toileting Assistance: Minimum assistance  Bladder Hygiene: Stand-by assistance (seated on commode)  Clothing Management: Minimum assistance  Cues: Verbal cues provided;Physical assistance for pants up;Physical assistance for pants down  Adaptive Equipment: Walker;Grab bars    Cognitive Retraining  Safety/Judgement: Awareness of environment    Neuro Re-Education:           Therapeutic Exercises:     Pain:  Pain Scale 1: Numeric (0 - 10)  Pain Intensity 1: 9  Pain Location 1: Back  Pain Orientation 1: Lower  Pain Description 1: Aching;Constant  Pain Intervention(s) 1: Medication (see MAR); Distraction; Emotional support; Family Support  Activity Tolerance:   VSS  Please refer to the flowsheet for vital signs taken during this treatment.   After treatment:   [] Patient left in no apparent distress sitting up in chair  [x] Patient left in no apparent distress in bed  [x] Call bell left within reach  [x] Nursing notified  [x] Caregiver present  [] Bed alarm activated    COMMUNICATION/COLLABORATION:   The patients plan of care was discussed with: Physical Therapist and Registered Nurse    SHUBHAM Damon/L  Time Calculation: 40 mins

## 2018-07-18 NOTE — PROGRESS NOTES
7/18/2018   CARE MANAGEMENT NOTE:  CM resubmitted a referral to [address] [place] [address] for re-eval and recommendations on appropriate level of rehab given pt's extended hospital course.     Olamide

## 2018-07-18 NOTE — PROGRESS NOTES
Gabino Higuera Khrisda Beaver 81 [address] Bateman YOB: 1990 Assessment & Plan:  
NMO · Pheresis every other day x 5 · Treatment #2 yesterday · Phenergan with pheresis · Fibrinogen better today Subjective:  
CC: F/u NMO 
HPI: For pheresis today Current Facility-Administered Medications Medication Dose Route Frequency  furosemide (LASIX) tablet 20 mg  20 mg Oral ONCE  nystatin (MYCOSTATIN) 100,000 unit/mL oral suspension 500,000 Units  500,000 Units Oral QID  pregabalin (LYRICA) capsule 200 mg  200 mg Oral TID  
 HYDROmorphone (PF) (DILAUDID) injection 1 mg  1 mg IntraVENous Q4H PRN  
 HYDROmorphone (DILAUDID) tablet 2 mg  2 mg Oral Q4H PRN  
 calcium gluconate 1 g in 0.9% sodium chloride 100 mL IVPB  1 g IntraVENous PRN  
 albumin human 5% (BUMINATE) solution 100 g  2,000 mL IntraVENous EVERY OTHER DAY  calcium gluconate 2 g in 0.9% sodium chloride 100 mL IVPB  2 g IntraVENous EVERY OTHER DAY  
 0.9% sodium chloride infusion 2,000 mL  2,000 mL IntraVENous EVERY OTHER DAY  anticoagulant citrate dextrose (ACD-A) solution  1,000 mL In Vitro EVERY OTHER DAY  lidocaine (XYLOCAINE) 10 mg/mL (1 %) injection 20 mL  20 mL SubCUTAneous Multiple  promethazine (PHENERGAN) 12.5 mg in 0.9% sodium chloride 50 mL IVPB  12.5 mg IntraVENous Q4H PRN  
 diphenhydrAMINE (BENADRYL) capsule 50 mg  50 mg Oral Q6H PRN  
 sorbitol 70 % solution 30 mL  30 mL Oral DAILY PRN  
 zolpidem (AMBIEN) tablet 5 mg  5 mg Oral QHS PRN  
 senna-docusate (PERICOLACE) 8.6-50 mg per tablet 1 Tab  1 Tab Oral BID  zolpidem (AMBIEN) tablet 5 mg  5 mg Oral QHS  FLUoxetine (PROzac) capsule 20 mg  20 mg Oral QHS  sodium chloride (NS) flush 5-10 mL  5-10 mL IntraVENous Q8H  
 sodium chloride (NS) flush 5-10 mL  5-10 mL IntraVENous PRN  
 acetaminophen (TYLENOL) tablet 650 mg  650 mg Oral Q4H PRN  
 enoxaparin (LOVENOX) injection 40 mg  40 mg SubCUTAneous Q24H  polyethylene glycol (MIRALAX) packet 17 g  17 g Oral BID Objective:  
 
Vitals: 
Blood pressure 122/78, pulse (!) 102, temperature 98 °F (36.7 °C), resp. rate 18, height 5' 6\" (1.676 m), weight 113.7 kg (250 lb 9.6 oz), SpO2 97 %, not currently breastfeeding. Temp (24hrs), Av.6 °F (37 °C), Min:98 °F (36.7 °C), Max:98.9 °F (37.2 °C) Intake and Output: 
  
 190 -  0700 In: 1120 [P.O.:1120] Out:  [Urine:] Physical Exam:               
GENERAL ASSESSMENT: NAD EXTREMITY: no EDEMA 
 
 
   
ECG/rhythm: 
 
Data Review No results for input(s): TNIPOC in the last 72 hours. No lab exists for component: ITNL No results for input(s): CPK, CKMB, TROIQ in the last 72 hours. Recent Labs  
   18 
 0246  18 
 8553  18 
 6884 NA  140  141  139  
K  3.9  4.3  4.5  
CL  105  106  104 CO2  28  25  30 BUN  7  8  11 CREA  0.83  0.83  0.83 GLU  137*  101*  83 PHOS  4.4  5.9*  3.5 MG  1.9  1.9  2.2 CA  7.9*  8.1*  7.7* ALB  2.6*  2.9*  2.9* WBC  17.5*  21.2*  17.0*  
HGB  11.7  13.0  13.1 HCT  35.8  39.2  39.9 PLT  110*  116*  127* Recent Labs  
   18 
 0246  18 
 0405  18 
 6461 INR  1.0  1.1  1.1 PTP  9.9  10.9  10.6 Needs: urine analysis, urine sodium, protein and creatinine No results found for: RACHEL THAKKAR 
 
 
 
: Pamela Carlos MD 
2018 [facility]: 
[email or web address] [place] office: 
[address] [place] [address], [address] [facility] [address] [facility] Phone: [phone] Fax :     [phone] [place] office: 
[address] [facility], [address] [place] [address] [address] Phone - [phone] Fax - [phone]

## 2018-07-18 NOTE — PROGRESS NOTES
Palliative Medicine Consult    Patient Name: Omayra Nieto  YOB: 1990    Date of Initial Consult: 7/10/18  Reason for Consult: Overwhelming symptoms  Requesting Provider: Dr. Parish Jones  Primary Care Physician: Joyce Stewart NP      SUMMARY:   Omayra Nieto is a 34 y.o. with a past history of seizure d/o(not on meds), who was admitted on 7/8/2018 from home with a diagnosis of numbness in LE and back pain. Current medical issues leading to Palliative Medicine involvement include: overwhelming symptoms    Chart reviewed/HPI-patient with prior h/o of lower extremity weakness/numbness abput 7 years ago. She was admitted to [facility] but cause never known and she was discharged after 1 week in the hospital. During that time, she was receiving high doses of IV dilaudid to the point where her mom apparently weaned her off once she returned home. Patient was returning from the beach last Thursday and as she was getting into her car, her left leg \"gave out\" and had associated back pain. Thought maybe related to sleeping on different bed at the beach but over the weekend, symptoms progressed to include left leg numbness to right leg numbness and then numbness from the waist distally. In addition, she describes severe back pain. Workup to include MRI brain, cervical, thoracic and lumbar spine, LP, neurology evaluation have not been conclusive. There is concern about transverse myelitis and patient been started on high dose IV solumedrol. In addition, she has been placed on gabapentin 600 mg(dose increased earlier today by Neurology). Since being in the hospital, she has used ultram, zanaflex, percocet and toradol and states none have provided her any relief of her back pain. Patient with other issues to include urinary incontinence and constipation issues.       reviewed  Only 2 scripts in the last year->one for valium on 4/24/18 and 1 for percocet 5/325 on 9/7/17    SH-lives with her boyfriend, Denies any illicit drug use. No significant alcohol intake. Works at [employer] with [employer]. Mom is a nurse with [address] [employer] [place]:   1. Acute back pain, unclear etiology-both neuropathic and somatic pain  2. 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 Numbness in lower extremities, unclear cause-workup in progress  4. Debility         PLAN:   1. Met with patient and father. She seems to be improving with her neuro issues and pain is stable. Had 2 bowel movements yesterday. 2. Pain management-remains a complicated situation since her positive antibody but other studies(MRI of the spine does not show any abnormalities consistent with neuromyelitis. Discussed situation with Dr. Sonal Jesus again today and pain management remains complicated and difficult to fully assess her needs. Will continue dilaudid 1 mg IV every 4 hours prn severe pain(explained that 1 mg IV dilaudid=4 mg po dilaudid so this is double her current dosing) and continue po dilaudid 2 mg po every 4 hours prn moderate pain. At this point, she has used IV dilaudid x 6 dosed yakov last 24 hours and po dilaudid x 0 doses. This was my worry that she would transition to the IV opioids only. Will begin discussion tomorrow about transition back to oral medications over the weekend as we anticipate discharge early next week. Will continue to monitor dosing closely. Will continue her lyrica 200 mg tid   3. Discussed with Dr. Julian Castellanos and Dr. Sonal Jesus  4. Constipation-seems improved overall. Continue current bowel regiment  5. Discussed with bedside nurse  6. Initial consult note routed to primary continuity provider  7.  Communicated plan of care with: Palliative IDT       GOALS OF CARE / TREATMENT PREFERENCES:   [====Goals of Care====]  GOALS OF CARE:  Patient/Health Care Proxy Stated Goals: Rehabilitation      TREATMENT PREFERENCES:   Code Status: Full Code    Advance Care Planning:  Advance Care Planning 7/8/2018   Patient's Healthcare Decision Maker is: Legal Next of Kin   Confirm Advance Directive None       Medical Interventions: Full interventions  Other Instructions: The palliative care team has discussed with patient / health care proxy about goals of care / treatment preferences for patient.  [====Goals of Care====]         HISTORY:     History obtained from: chart, patient, father    CHIEF COMPLAINT: back pain    HPI/SUBJECTIVE:    The patient is:   [x] Verbal and participatory  [] Non-participatory due to:   Patient is alert and oriented. Remains fatigued but due for plasma exchange today    Clinical Pain Assessment (nonverbal scale for severity on nonverbal patients):   Clinical Pain Assessment  Severity: 5  Location: lower back  Character: throbbing  Duration: days  Effect: difficult to ambulate  Factors: movement  Frequency: constant            FUNCTIONAL ASSESSMENT:     Palliative Performance Scale (PPS):  PPS: 50       PSYCHOSOCIAL/SPIRITUAL SCREENING:     Advance Care Planning:  Advance Care Planning 7/8/2018   Patient's Healthcare Decision Maker is: Legal Next of Kin   Confirm Advance Directive None        Any spiritual / Mosque concerns:  [] Yes /  [x] No    Caregiver Burnout:  [] Yes /  [x] No /  [] No Caregiver Present      Anticipatory grief assessment:   [x] Normal  / [] Maladaptive       ESAS Anxiety: Anxiety: 1    ESAS Depression: Depression: 0        REVIEW OF SYSTEMS:     Positive and pertinent negative findings in ROS are noted above in HPI. The following systems were [x] reviewed / [] unable to be reviewed as noted in HPI  Other findings are noted below. Systems: constitutional, ears/nose/mouth/throat, respiratory, gastrointestinal, genitourinary, musculoskeletal, integumentary, neurologic, psychiatric, endocrine. Positive findings noted below.   Modified ESAS Completed by: provider   Fatigue: 1 Drowsiness: 0 Depression: 0 Pain: 5   Anxiety: 1 Nausea: 0   Anorexia: 0 Dyspnea: 0     Constipation: Yes     Stool Occurrence(s): 1        PHYSICAL EXAM:     From RN flowsheet:  Wt Readings from Last 3 Encounters:   07/17/18 250 lb 9.6 oz (113.7 kg)   04/27/18 251 lb (113.9 kg)   04/27/18 246 lb 14.6 oz (112 kg)     Blood pressure 122/78, pulse (!) 102, temperature 98 °F (36.7 °C), resp. rate 18, height 5' 6\" (1.676 m), weight 250 lb 9.6 oz (113.7 kg), SpO2 97 %, not currently breastfeeding. Pain Scale 1: Numeric (0 - 10)  Pain Intensity 1: 9  Pain Onset 1: ongoing  Pain Location 1: Back  Pain Orientation 1: Lower  Pain Description 1: Aching, Constant  Pain Intervention(s) 1: Medication (see MAR), Distraction, Emotional support, Family Support  Last bowel movement, if known:     General-NAD, calm  Lungs-CTA  CV-RRR  Neuro-numbness remains in the left leg with associated weakness but better overall,  mild TTP lower back area     HISTORY:     Principal Problem:    Neuromyelitis optica ([facility] [address] (7/14/2018)    Active Problems:    Left leg weakness (7/8/2018)      Numbness in left leg (7/8/2018)      Seizures (HCC) ()      Nonintractable migraine (7/8/2018)      Urinary incontinence (7/9/2018)      Bradycardia, sinus (7/9/2018)      Obesity (BMI 30-39.9) (7/9/2018)      Neuromyelitis optica spectrum disorder ([address] (7/14/2018)      Past Medical History:   Diagnosis Date    Headache     Headache(784.0)     Neuromyelitis optica ([address] 7/14/2018    Obesity (BMI 30-39. 9) 7/9/2018    Seizures (HCC)       Past Surgical History:   Procedure Laterality Date    ENDOSCOPY, COLON, DIAGNOSTIC      HX APPENDECTOMY      HX CHOLECYSTECTOMY      HX GI      HX GYN        Family History   Problem Relation Age of Onset    Hypertension Father     Diabetes Maternal Grandmother     Coronary Artery Disease Maternal Grandfather     Diabetes Maternal Grandfather     Diabetes Paternal Grandfather       History reviewed, no pertinent family history. Social History   Substance Use Topics    Smoking status: Current Every Day Smoker    Smokeless tobacco: Never Used    Alcohol use Yes     Allergies   Allergen Reactions    Codeine Other (comments)     Tachycardia    Sulfa (Sulfonamide Antibiotics) Itching    Bactrim [Sulfamethoprim Ds] Itching    Chocolate Flavor Other (comments)     Pt reports migraines    Clindamycin Other (comments)     Chest tightness    Compazine [Prochlorperazine Edisylate] Other (comments)     Neck and jaw spasm  And difficultly swallow     Morphine Hives    Peanut Other (comments)     migraines    Reglan [Metoclopramide] Nausea and Vomiting and Vertigo     Sleep disorder and shaking    Sulfur Swelling     Throat closes.     Zofran [Ondansetron Hcl (Pf)] Hives    Metaxalone Palpitations      Current Facility-Administered Medications   Medication Dose Route Frequency    furosemide (LASIX) tablet 20 mg  20 mg Oral ONCE    nystatin (MYCOSTATIN) 100,000 unit/mL oral suspension 500,000 Units  500,000 Units Oral QID    pregabalin (LYRICA) capsule 200 mg  200 mg Oral TID    HYDROmorphone (PF) (DILAUDID) injection 1 mg  1 mg IntraVENous Q4H PRN    HYDROmorphone (DILAUDID) tablet 2 mg  2 mg Oral Q4H PRN    calcium gluconate 1 g in 0.9% sodium chloride 100 mL IVPB  1 g IntraVENous PRN    albumin human 5% (BUMINATE) solution 100 g  2,000 mL IntraVENous EVERY OTHER DAY    calcium gluconate 2 g in 0.9% sodium chloride 100 mL IVPB  2 g IntraVENous EVERY OTHER DAY    0.9% sodium chloride infusion 2,000 mL  2,000 mL IntraVENous EVERY OTHER DAY    anticoagulant citrate dextrose (ACD-A) solution  1,000 mL In Vitro EVERY OTHER DAY    lidocaine (XYLOCAINE) 10 mg/mL (1 %) injection 20 mL  20 mL SubCUTAneous Multiple    promethazine (PHENERGAN) 12.5 mg in 0.9% sodium chloride 50 mL IVPB  12.5 mg IntraVENous Q4H PRN    diphenhydrAMINE (BENADRYL) capsule 50 mg  50 mg Oral Q6H PRN    sorbitol 70 % solution 30 mL  30 mL Oral DAILY PRN    zolpidem (AMBIEN) tablet 5 mg  5 mg Oral QHS PRN    senna-docusate (PERICOLACE) 8.6-50 mg per tablet 1 Tab  1 Tab Oral BID    zolpidem (AMBIEN) tablet 5 mg  5 mg Oral QHS    FLUoxetine (PROzac) capsule 20 mg  20 mg Oral QHS    sodium chloride (NS) flush 5-10 mL  5-10 mL IntraVENous Q8H    sodium chloride (NS) flush 5-10 mL  5-10 mL IntraVENous PRN    acetaminophen (TYLENOL) tablet 650 mg  650 mg Oral Q4H PRN    enoxaparin (LOVENOX) injection 40 mg  40 mg SubCUTAneous Q24H    polyethylene glycol (MIRALAX) packet 17 g  17 g Oral BID          LAB AND IMAGING FINDINGS:     Lab Results   Component Value Date/Time    WBC 17.5 (H) 07/18/2018 02:46 AM    HGB 11.7 07/18/2018 02:46 AM    PLATELET 746 (L) 68/62/2132 02:46 AM     Lab Results   Component Value Date/Time    Sodium 140 07/18/2018 02:46 AM    Potassium 3.9 07/18/2018 02:46 AM    Chloride 105 07/18/2018 02:46 AM    CO2 28 07/18/2018 02:46 AM    BUN 7 07/18/2018 02:46 AM    Creatinine 0.83 07/18/2018 02:46 AM    Calcium 7.9 (L) 07/18/2018 02:46 AM    Magnesium 1.9 07/18/2018 02:46 AM    Phosphorus 4.4 07/18/2018 02:46 AM      Lab Results   Component Value Date/Time    AST (SGOT) 31 07/18/2018 02:46 AM    Alk. phosphatase 43 (L) 07/18/2018 02:46 AM    Protein, total 4.5 (L) 07/18/2018 02:46 AM    Albumin 2.6 (L) 07/18/2018 02:46 AM    Globulin 1.9 (L) 07/18/2018 02:46 AM     Lab Results   Component Value Date/Time    INR 1.0 07/18/2018 02:46 AM    Prothrombin time 9.9 07/18/2018 02:46 AM      No results found for: IRON, FE, TIBC, IBCT, PSAT, FERR   No results found for: PH, PCO2, PO2  No components found for: GLPOC   No results found for: CPK, CKMB             Total time:   Counseling / coordination time, spent as noted above:   > 50% counseling / coordination?:     Prolonged service was provided for  []30 min   []75 min in face to face time in the presence of the patient, spent as noted above.   Time Start:   Time End:   Note: this can only be billed with [id number] (initial) or [id number] (follow up). If multiple start / stop times, list each separately.

## 2018-07-18 NOTE — PROCEDURES
[facility] Acutes   [phone]   Vitals Pre Post Assessment Pre Post   /82 137/87 LOC A&Ox4 A&Ox4    112 Lungs Clear&bronchovescicularx5, even&unlaboured. Clear&broncho  vescicularx5, even&unlabour  ed. Temp 98.3 98.0 Cardiac Sinus tachycardia Sinus tachycardia   Resp 22 20 Skin Warm & dry Warm & dry   Weight 240  lbs 240  lbs Edema bilat LE's noted +3 bilat LE's noted  +3   Height 5' 6\" 5' 6\" Pain Medicated by floor RN Medicated by floor RN     Plasmapheresis Orders   Product:  2L 5% Albumin IV+2L 0.9% NS IV                                            Volume: 4L                                               Duration: min Start: 1902 End: 2000 Total: >75min     Fluids    Volume Processed: 6427ml   Plasma Removed: 3832ml   Replacement Fluid: 3827ml   Citrate: 49ml   NS: 120ml (Calcium Gluconate)     Access   Type & Location: LIJ catheter   Comments: Exhibits good flows upon aspiration; dressing changed per protocol-new dressing dry and intact and s drainage nor inflammation noted around biopatch. Post-tx all possible blood returned via full rinceback. Both catheter ports flushed and indwelled c 0.9% NS and capped and taped. Meds Given   Name Dose Route        Calcium Gluconate 2grams administered slow IVPB over tx course s difficulties               Labs   Obtained/Reviewed  Critical Results Called CBC, plts, NMO-Antibody  MD aware     Comments:   Pre-tx consents verified/time-out performed. Tx progressed well and without incident; writer left pts room c pt in no new acute distress and denying complaints c stable BP, bed in lowest position c side rails upx3, wheels lockedx4, and call bell within reach. Reported off to Gina Chua RN. Cleveland Gonzalez RN.

## 2018-07-19 LAB
ALBUMIN SERPL-MCNC: 3 G/DL (ref 3.5–5)
ALBUMIN/GLOB SERPL: 2.7 {RATIO} (ref 1.1–2.2)
ALP SERPL-CCNC: 29 U/L (ref 45–117)
ALT SERPL-CCNC: 78 U/L (ref 12–78)
ANION GAP SERPL CALC-SCNC: 3 MMOL/L (ref 5–15)
AST SERPL-CCNC: 52 U/L (ref 15–37)
BASOPHILS # BLD: 0.3 K/UL (ref 0–0.1)
BASOPHILS NFR BLD: 2 % (ref 0–1)
BILIRUB SERPL-MCNC: 1 MG/DL (ref 0.2–1)
BUN SERPL-MCNC: 6 MG/DL (ref 6–20)
BUN/CREAT SERPL: 8 (ref 12–20)
CALCIUM SERPL-MCNC: 8 MG/DL (ref 8.5–10.1)
CHLORIDE SERPL-SCNC: 107 MMOL/L (ref 97–108)
CO2 SERPL-SCNC: 29 MMOL/L (ref 21–32)
CREAT SERPL-MCNC: 0.72 MG/DL (ref 0.55–1.02)
DIFFERENTIAL METHOD BLD: ABNORMAL
EOSINOPHIL # BLD: 1 K/UL (ref 0–0.4)
EOSINOPHIL NFR BLD: 6 % (ref 0–7)
ERYTHROCYTE [DISTWIDTH] IN BLOOD BY AUTOMATED COUNT: 13.4 % (ref 11.5–14.5)
FIBRINOGEN PPP-MCNC: 132 MG/DL (ref 200–475)
GLOBULIN SER CALC-MCNC: 1.1 G/DL (ref 2–4)
GLUCOSE SERPL-MCNC: 87 MG/DL (ref 65–100)
HCT VFR BLD AUTO: 37.5 % (ref 35–47)
HGB BLD-MCNC: 12.2 G/DL (ref 11.5–16)
IMM GRANULOCYTES # BLD: 0 K/UL
IMM GRANULOCYTES NFR BLD AUTO: 0 %
INR PPP: 1.2 (ref 0.9–1.1)
LYMPHOCYTES # BLD: 3.4 K/UL (ref 0.8–3.5)
LYMPHOCYTES NFR BLD: 21 % (ref 12–49)
MCH RBC QN AUTO: 31.9 PG (ref 26–34)
MCHC RBC AUTO-ENTMCNC: 32.5 G/DL (ref 30–36.5)
MCV RBC AUTO: 98.2 FL (ref 80–99)
MONOCYTES # BLD: 0.6 K/UL (ref 0–1)
MONOCYTES NFR BLD: 4 % (ref 5–13)
NEUTS SEG # BLD: 10.8 K/UL (ref 1.8–8)
NEUTS SEG NFR BLD: 67 % (ref 32–75)
NRBC # BLD: 0 K/UL (ref 0–0.01)
NRBC BLD-RTO: 0 PER 100 WBC
PLATELET # BLD AUTO: 117 K/UL (ref 150–400)
PMV BLD AUTO: 10.5 FL (ref 8.9–12.9)
POTASSIUM SERPL-SCNC: 4.4 MMOL/L (ref 3.5–5.1)
PROT SERPL-MCNC: 4.1 G/DL (ref 6.4–8.2)
PROTHROMBIN TIME: 11.7 SEC (ref 9–11.1)
RBC # BLD AUTO: 3.82 M/UL (ref 3.8–5.2)
RBC MORPH BLD: ABNORMAL
RBC MORPH BLD: ABNORMAL
SODIUM SERPL-SCNC: 139 MMOL/L (ref 136–145)
WBC # BLD AUTO: 16.1 K/UL (ref 3.6–11)

## 2018-07-19 PROCEDURE — 74011250637 HC RX REV CODE- 250/637: Performed by: INTERNAL MEDICINE

## 2018-07-19 PROCEDURE — 97116 GAIT TRAINING THERAPY: CPT

## 2018-07-19 PROCEDURE — 65660000000 HC RM CCU STEPDOWN

## 2018-07-19 PROCEDURE — 74011250636 HC RX REV CODE- 250/636: Performed by: PSYCHIATRY & NEUROLOGY

## 2018-07-19 PROCEDURE — 74011250637 HC RX REV CODE- 250/637: Performed by: FAMILY MEDICINE

## 2018-07-19 PROCEDURE — 97530 THERAPEUTIC ACTIVITIES: CPT

## 2018-07-19 PROCEDURE — 85025 COMPLETE CBC W/AUTO DIFF WBC: CPT | Performed by: INTERNAL MEDICINE

## 2018-07-19 PROCEDURE — 74011250637 HC RX REV CODE- 250/637: Performed by: PSYCHIATRY & NEUROLOGY

## 2018-07-19 PROCEDURE — 51798 US URINE CAPACITY MEASURE: CPT

## 2018-07-19 PROCEDURE — 80053 COMPREHEN METABOLIC PANEL: CPT | Performed by: INTERNAL MEDICINE

## 2018-07-19 PROCEDURE — 97110 THERAPEUTIC EXERCISES: CPT

## 2018-07-19 PROCEDURE — 77030011943

## 2018-07-19 PROCEDURE — 85384 FIBRINOGEN ACTIVITY: CPT | Performed by: STUDENT IN AN ORGANIZED HEALTH CARE EDUCATION/TRAINING PROGRAM

## 2018-07-19 PROCEDURE — 36415 COLL VENOUS BLD VENIPUNCTURE: CPT | Performed by: STUDENT IN AN ORGANIZED HEALTH CARE EDUCATION/TRAINING PROGRAM

## 2018-07-19 PROCEDURE — 74011250636 HC RX REV CODE- 250/636: Performed by: INTERNAL MEDICINE

## 2018-07-19 PROCEDURE — 74011250636 HC RX REV CODE- 250/636: Performed by: FAMILY MEDICINE

## 2018-07-19 PROCEDURE — 77030038269 HC DRN EXT URIN PURWCK BARD -A

## 2018-07-19 PROCEDURE — 74011000258 HC RX REV CODE- 258: Performed by: INTERNAL MEDICINE

## 2018-07-19 PROCEDURE — 85610 PROTHROMBIN TIME: CPT | Performed by: STUDENT IN AN ORGANIZED HEALTH CARE EDUCATION/TRAINING PROGRAM

## 2018-07-19 RX ORDER — FUROSEMIDE 10 MG/ML
20 INJECTION INTRAMUSCULAR; INTRAVENOUS ONCE
Status: COMPLETED | OUTPATIENT
Start: 2018-07-19 | End: 2018-07-19

## 2018-07-19 RX ADMIN — HYDROMORPHONE HYDROCHLORIDE 1 MG: 2 INJECTION, SOLUTION INTRAMUSCULAR; INTRAVENOUS; SUBCUTANEOUS at 23:01

## 2018-07-19 RX ADMIN — ZOLPIDEM TARTRATE 5 MG: 5 TABLET ORAL at 22:09

## 2018-07-19 RX ADMIN — HYDROMORPHONE HYDROCHLORIDE 2 MG: 2 TABLET ORAL at 17:06

## 2018-07-19 RX ADMIN — ZOLPIDEM TARTRATE 5 MG: 5 TABLET ORAL at 00:49

## 2018-07-19 RX ADMIN — NYSTATIN 500000 UNITS: 100000 SUSPENSION ORAL at 10:00

## 2018-07-19 RX ADMIN — PROMETHAZINE HYDROCHLORIDE 12.5 MG: 25 INJECTION INTRAMUSCULAR; INTRAVENOUS at 16:29

## 2018-07-19 RX ADMIN — FUROSEMIDE 20 MG: 10 INJECTION, SOLUTION INTRAVENOUS at 11:04

## 2018-07-19 RX ADMIN — HYDROMORPHONE HYDROCHLORIDE 1 MG: 2 INJECTION, SOLUTION INTRAMUSCULAR; INTRAVENOUS; SUBCUTANEOUS at 07:03

## 2018-07-19 RX ADMIN — POLYETHYLENE GLYCOL (3350) 17 G: 17 POWDER, FOR SOLUTION ORAL at 17:06

## 2018-07-19 RX ADMIN — SENNOSIDES AND DOCUSATE SODIUM 1 TABLET: 8.6; 5 TABLET ORAL at 10:00

## 2018-07-19 RX ADMIN — SENNOSIDES AND DOCUSATE SODIUM 1 TABLET: 8.6; 5 TABLET ORAL at 17:06

## 2018-07-19 RX ADMIN — HYDROMORPHONE HYDROCHLORIDE 1 MG: 2 INJECTION, SOLUTION INTRAMUSCULAR; INTRAVENOUS; SUBCUTANEOUS at 15:01

## 2018-07-19 RX ADMIN — Medication 10 ML: at 07:04

## 2018-07-19 RX ADMIN — FLUOXETINE 20 MG: 20 CAPSULE ORAL at 22:08

## 2018-07-19 RX ADMIN — POLYETHYLENE GLYCOL (3350) 17 G: 17 POWDER, FOR SOLUTION ORAL at 10:00

## 2018-07-19 RX ADMIN — Medication 10 ML: at 15:03

## 2018-07-19 RX ADMIN — PREGABALIN 200 MG: 100 CAPSULE ORAL at 15:01

## 2018-07-19 RX ADMIN — HYDROMORPHONE HYDROCHLORIDE 1 MG: 2 INJECTION, SOLUTION INTRAMUSCULAR; INTRAVENOUS; SUBCUTANEOUS at 02:57

## 2018-07-19 RX ADMIN — NYSTATIN 500000 UNITS: 100000 SUSPENSION ORAL at 22:08

## 2018-07-19 RX ADMIN — HYDROMORPHONE HYDROCHLORIDE 1 MG: 2 INJECTION, SOLUTION INTRAMUSCULAR; INTRAVENOUS; SUBCUTANEOUS at 11:06

## 2018-07-19 RX ADMIN — PREGABALIN 200 MG: 100 CAPSULE ORAL at 10:00

## 2018-07-19 RX ADMIN — HYDROMORPHONE HYDROCHLORIDE 2 MG: 2 TABLET ORAL at 10:06

## 2018-07-19 RX ADMIN — NYSTATIN 500000 UNITS: 100000 SUSPENSION ORAL at 12:44

## 2018-07-19 RX ADMIN — PREGABALIN 200 MG: 100 CAPSULE ORAL at 22:08

## 2018-07-19 RX ADMIN — ENOXAPARIN SODIUM 40 MG: 40 INJECTION SUBCUTANEOUS at 17:06

## 2018-07-19 RX ADMIN — NYSTATIN 500000 UNITS: 100000 SUSPENSION ORAL at 17:06

## 2018-07-19 RX ADMIN — ZOLPIDEM TARTRATE 5 MG: 5 TABLET ORAL at 23:13

## 2018-07-19 RX ADMIN — HYDROMORPHONE HYDROCHLORIDE 2 MG: 2 TABLET ORAL at 21:08

## 2018-07-19 RX ADMIN — HYDROMORPHONE HYDROCHLORIDE 1 MG: 2 INJECTION, SOLUTION INTRAMUSCULAR; INTRAVENOUS; SUBCUTANEOUS at 19:00

## 2018-07-19 NOTE — PROGRESS NOTES
Problem: Self Care Deficits Care Plan (Adult)  Goal: *Acute Goals and Plan of Care (Insert Text)  Occupational Therapy Goals  7/18/2018- weekly re assessment  1. Patient will perform ADLs sitting EOB 5 mins without reports of pain supervision/setup within 7 day(s). 2.  Patient will perform lower body ADLs with minimal assistance/contact guard assist within 7 day(s). 3.  Patient will perform bathing with minimal assistance/contact guard assist within 7 day(s). 4.  Patient will perform toilet transfers with minimal assistance/contact guard assist and least restrictive device within 7 day(s). 5.  Patient will perform all aspects of toileting with supervision/set-up within 7 day(s). 6.  Patient will participate in upper extremity therapeutic exercise/activities to increase independence with ADLs with independence for 5 minutes within 7 day(s). 7.  Patient will verbalize/demonstrate 3/3 back precautions during ADL tasks without cues within 7 days. Initiated 7/10/2018  1. Patient will perform ADLs sitting EOB 5 mins without reports of pain supervision/setup within 7 day(s). progressing  2. Patient will perform lower body ADLs with minimal assistance/contact guard assist within 7 day(s). - progressing  3. Patient will perform bathing with minimal assistance/contact guard assist within 7 day(s). - progressing  4. Patient will perform toilet transfers with minimal assistance/contact guard assist x2 and least restrictive device within 7 day(s). - met  5. Patient will perform all aspects of toileting with supervision/set-up within 7 day(s). - progressing  6. Patient will participate in upper extremity therapeutic exercise/activities to increase independence with ADLs with independence for 5 minutes within 7 day(s). - progressing  7. Patient will verbalize/demonstrate 3/3 back precautions during ADL tasks without cues within 7 days.  - progressing        Occupational Therapy TREATMENT  Patient: [place] (32 y.o. female)  Date: 7/19/2018  Diagnosis: Left leg weakness  Left leg weakness  epidural blood patch  Neuromyelitis optica spectrum disorder (HCC) Neuromyelitis optica (HCC)  Procedure(s) (LRB):  epidural blood patch (N/A) 8 Days Post-Op  Precautions: Back, Fall (L knee buckling)  Chart, occupational therapy assessment, plan of care, and goals were reviewed. ASSESSMENT:  Pt needing encouragement from family to engage with activity as she verbalized she is extremely fatigued and in pain not due for pain medications. Once pt began exercises for her upper body though she was able to tolerate 5 minutes of them seated edge of bed (see below). Pt requested hand out showing exercises to perform as she stated \"I  will work on them. I mean I am up in the middle of the night so I might as well as work on something. \"  Cont to recommend rehab. Progression toward goals:  []       Improving appropriately and progressing toward goals  [x]       Improving slowly and progressing toward goals  []       Not making progress toward goals and plan of care will be adjusted     PLAN:  Patient continues to benefit from skilled intervention to address the above impairments. Continue treatment per established plan of care. Discharge Recommendations:  Rehab  Further Equipment Recommendations for Discharge: None     SUBJECTIVE:   Patient stated Can you give me a handout of the exercises? Aakash Rivera    OBJECTIVE DATA SUMMARY:   Cognitive/Behavioral Status:  Neurologic State: Alert  Orientation Level: Oriented X4  Cognition: Appropriate safety awareness; Appropriate for age attention/concentration; Appropriate decision making             Functional Mobility and Transfers for ADLs:  Bed Mobility:     Supervision supine to sit.   Transfers:   Not tested          Balance: Sitting intact edge of bed      Therapeutic Exercises:     EXERCISE   Sets   Reps   Active Active Assist   Passive   Comments   Wrist flex/ext 2 10 [x]           []           [] Elbow flex/ext 2 10 [x]           []           []              Chest presses 2 10 [x]           []           []              Forearm supination/pronation 2 10 [x]           []           []              Shoulder flex/ext 2 10 [x]           []           []              Shoulder shrugs 2 10 [x]           []           []              Shoulder ab/add 2 10 [x]           []           []                 []           []           []                 []           []           []                 []           []           []                 []           []           []                  Pain:  Pain Scale 1: Numeric (0 - 10)  Pain Intensity 1: 10  Pain Location 1: Back  Pain Orientation 1: Lower  Pain Description 1: Aching  Pain Intervention(s) 1: Medication (see MAR)  Activity Tolerance:   Fair  Please refer to the flowsheet for vital signs taken during this treatment.   After treatment:   [] Patient left in no apparent distress sitting up in chair  [x] Patient left in no apparent distress in bed  [x] Call bell left within reach  [x] Nursing notified  [x] Caregiver present  [] Bed alarm activated    COMMUNICATION/COLLABORATION:   The patients plan of care was discussed with: Physical Therapist, Occupational Therapist and Registered Nurse    KENNEDY Hrerera  Time Calculation: 20 mins

## 2018-07-19 NOTE — PROGRESS NOTES
[address]  [address] [facility], [address] [place] [address]  [phone]      Medical Progress Note      NAME: Bayard Kehr   :  1990  MRM:  [id number]    Date/Time: 2018  9:43 AM       Assessment and Plan:   1. LT leg weakness and numbness/ lower back pain. 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 Initially treated with solumedrol and [id number] dose of plasmapheresis( total of 5 doses every other day. Last dose on ). MRI repeated with contrast still showed no lesions. Continue PT/OT. May need rehab on discharged. Symptomatic management. Discussed with neurology. Palliative care managing pain.        2. Thrush. On Nystatin swish and swallow     3.  Seizures ([address] (): no recent SZ. Not on AED. Follow up with neurology     4. Nonintractable migraine: post-LP HA resolved after blood patch.     5.  Urinary incontinence/ retention: check bladder scan. due to ? NMO. Purewick.       6. Thrombocytopenia / leukocytosis: may be related to plasmapheresis. Monitor CBC.       7.  Obesity (BMI 30-39. 9): would benefit from weight loss and dietary / lifestyle modifications     8. Constipation: cont bowel regimen. KUB unremarkable. Had BMs      9. LE edema: mild. No DVT on dopplers            Subjective:     Chief Complaint:  Follow up of pt who was admitted with LT leg weakness and back pain. Still c/o back pain and weakness of LT leg    ROS:  (bold if positive, if negative)      Tolerating PT  Tolerating Diet        Objective:     Last 24hrs VS reviewed since prior progress note.  Most recent are:    Visit Vitals    /76 (BP 1 Location: Right arm, BP Patient Position: At rest)    Pulse (!) 108    Temp 98.4 °F (36.9 °C)    Resp 20    Ht 5' 6\" (1.676 m)    Wt 113.8 kg (250 lb 14.1 oz)    SpO2 96%    Breastfeeding No    BMI 40.49 kg/m2     SpO2 Readings from Last 6 Encounters:   18 96%   18 99%   18 96%   10/30/17 97%   10/21/16 98%   10/21/16 96%            Intake/Output Summary (Last 24 hours) at 07/19/18 0824  Last data filed at 07/18/18 2011   Gross per 24 hour   Intake              369 ml   Output             4682 ml   Net            -4313 ml        Physical Exam:    Gen:  Well-developed, well-nourished, in no acute distress  HEENT:  Pink conjunctivae, PERRL, hearing intact to voice, moist mucous membranes  Neck:  Supple, without masses, thyroid non-tender  Resp:  No accessory muscle use, clear breath sounds without wheezes rales or rhonchi  Card:  No murmurs, normal S1, S2 without thrills, bruits or peripheral edema  Abd:  Soft, non-tender, non-distended, normoactive bowel sounds are present, no palpable organomegaly and no detectable hernias  Lymph:  No cervical or inguinal adenopathy  Musc:  No cyanosis or clubbing  Skin:  No rashes or ulcers, skin turgor is good  Neuro:  Cranial nerves are grossly intact, LT leg weakness   Psych:  Good insight, oriented to person, place and time, alert  __________________________________________________________________  Medications Reviewed: (see below)  Medications:     Current Facility-Administered Medications   Medication Dose Route Frequency    nystatin (MYCOSTATIN) 100,000 unit/mL oral suspension 500,000 Units  500,000 Units Oral QID    pregabalin (LYRICA) capsule 200 mg  200 mg Oral TID    HYDROmorphone (PF) (DILAUDID) injection 1 mg  1 mg IntraVENous Q4H PRN    HYDROmorphone (DILAUDID) tablet 2 mg  2 mg Oral Q4H PRN    calcium gluconate 1 g in 0.9% sodium chloride 100 mL IVPB  1 g IntraVENous PRN    albumin human 5% (BUMINATE) solution 100 g  2,000 mL IntraVENous EVERY OTHER DAY    calcium gluconate 2 g in 0.9% sodium chloride 100 mL IVPB  2 g IntraVENous EVERY OTHER DAY    0.9% sodium chloride infusion 2,000 mL  2,000 mL IntraVENous EVERY OTHER DAY    anticoagulant citrate dextrose (ACD-A) solution  1,000 mL In Vitro EVERY OTHER DAY    lidocaine (XYLOCAINE) 10 mg/mL (1 %) injection 20 mL  20 mL SubCUTAneous Multiple    promethazine (PHENERGAN) 12.5 mg in 0.9% sodium chloride 50 mL IVPB  12.5 mg IntraVENous Q4H PRN    diphenhydrAMINE (BENADRYL) capsule 50 mg  50 mg Oral Q6H PRN    sorbitol 70 % solution 30 mL  30 mL Oral DAILY PRN    zolpidem (AMBIEN) tablet 5 mg  5 mg Oral QHS PRN    senna-docusate (PERICOLACE) 8.6-50 mg per tablet 1 Tab  1 Tab Oral BID    zolpidem (AMBIEN) tablet 5 mg  5 mg Oral QHS    FLUoxetine (PROzac) capsule 20 mg  20 mg Oral QHS    sodium chloride (NS) flush 5-10 mL  5-10 mL IntraVENous Q8H    sodium chloride (NS) flush 5-10 mL  5-10 mL IntraVENous PRN    acetaminophen (TYLENOL) tablet 650 mg  650 mg Oral Q4H PRN    enoxaparin (LOVENOX) injection 40 mg  40 mg SubCUTAneous Q24H    polyethylene glycol (MIRALAX) packet 17 g  17 g Oral BID        Lab Data Reviewed: (see below)  Lab Review:     Recent Labs      07/19/18   0328 07/18/18   0246  07/17/18   0643   WBC  16.1*  17.5*  21.2*   HGB  12.2  11.7  13.0   HCT  37.5  35.8  39.2   PLT  117*  110*  116*     Recent Labs      07/19/18   0328  07/18/18   0246  07/17/18   0643  07/17/18   0405   NA  139  140  141   --    K  4.4  3.9  4.3   --    CL  107  105  106   --    CO2  29  28  25   --    GLU  87  137*  101*   --    BUN  6  7  8   --    CREA  0.72  0.83  0.83   --    CA  8.0*  7.9*  8.1*   --    MG   --   1.9  1.9   --    PHOS   --   4.4  5.9*   --    ALB  3.0*  2.6*  2.9*   --    TBILI  1.0  0.5  0.5   --    SGOT  52*  31  29   --    ALT  78  69  60   --    INR  1.2*  1.0   --   1.1     Lab Results   Component Value Date/Time    Glucose (POC) 96 06/20/2012 01:55 AM    Glucose (POC) 93 05/17/2012 04:56 PM     No results for input(s): PH, PCO2, PO2, HCO3, FIO2 in the last 72 hours.   Recent Labs      07/19/18   0328  07/18/18   0246  07/17/18   0405   INR  1.2*  1.0  1.1     All Micro Results     Procedure Component Value Units Date/Time    CULTURE, CSF  TUBE 2 [[id number]] Collected:  07/09/18 0609 Order Status:  Completed Specimen:  Cerebrospinal Fluid Updated:  07/16/18 0949     Special Requests:         Culture performed on Unspun Fluid     GRAM STAIN NO ORGANISMS SEEN         FEW WBCS SEEN                 Smear Reviewed by Microbiology     Culture result:         Culture performed on Unspun Fluid              NO GROWTH ON SOLID MEDIA AT 4 DAYS              NO GROWTH IN THIO BROTH AT 7 DAYS    CULTURE, URINE [[id number]] Collected:  07/08/18 1500    Order Status:  Completed Specimen:  Urine from [place] Specimen Updated:  07/09/18 2030     Special Requests: NO SPECIAL REQUESTS        [place] Count <1,000 CFU/ML        Culture result: NO GROWTH 1 DAY       HSV BY PCR [[id number]] Collected:  07/09/18 1637    Order Status:  Canceled Specimen:  Other Updated:  07/09/18 1758          I have reviewed notes of prior 24hr. Other pertinent lab:       Total time spent with patient: [address] discussed with: Patient, Family, Nursing Staff, Consultant/Specialist and >50% of time spent in counseling and coordination of care    Discussed:  Care Plan    Prophylaxis:  Lovenox    Disposition:  SAH/Rehab           ___________________________________________________    Attending Physician: Joelle Golden MD

## 2018-07-19 NOTE — ROUTINE PROCESS
Bedside shift change report given to Lizeth (oncoming nurse) by Chrissy Harris (offgoing nurse). Report included the following information SBAR, Kardex, MAR and Recent Results.

## 2018-07-19 NOTE — PROGRESS NOTES
Neurology Progress Note    Interval Hx:      Follow up: Acute onset left leg weakness, severe lower back pain, right leg tingling/ paresthesias, urinary retention/incontinence, generalized hyperreflexia. Subjective:  Pt reports continued pain. She also feels like she is retaining urine and continues with leg swelling. She reports getting really nauseous with her PLEX yesterday. Lab review:  So far CSF has returned normal (WBC 4, Protein 38, Gram stain negative to date, HSV PCR negative), other than few hundred RBCs which is likely d/t traumatic tap. MS panel neg. CRP, CBC normal (other than mildly elevated Abs Lymphocytes 3.9). Other labs: ISABELLA, Copper, anti-ds DNA, urine heavy metals, lyme antibodies, SSA, SSB, were all negative.   NMO antibody was positive at >6        Current Facility-Administered Medications:     furosemide (LASIX) injection 20 mg, 20 mg, IntraVENous, ONCE, Mahi Fields MD    nystatin (MYCOSTATIN) 100,000 unit/mL oral suspension 500,000 Units, 500,000 Units, Oral, QID, Prince Ortiz MD, 500,000 Units at 07/19/18 1000    pregabalin (LYRICA) capsule 200 mg, 200 mg, Oral, TID, Olam Eisenmenger, MD, 200 mg at 07/19/18 1000    HYDROmorphone (PF) (DILAUDID) injection 1 mg, 1 mg, IntraVENous, Q2U PRN, Jamison Rich MD, 1 mg at 07/19/18 0703    HYDROmorphone (DILAUDID) tablet 2 mg, 2 mg, Oral, Q0X PRN, Jamison Rich MD, 2 mg at 07/19/18 1006    calcium gluconate 1 g in 0.9% sodium chloride 100 mL IVPB, 1 g, IntraVENous, PRN, James Bravo MD    albumin human 5% (BUMINATE) solution 100 g, 2,000 mL, IntraVENous, EVERY OTHER DAY, James Bravo MD, 100 g at 07/18/18 1913    calcium gluconate 2 g in 0.9% sodium chloride 100 mL IVPB, 2 g, IntraVENous, EVERY OTHER DAY, James Bravo MD, Last Rate: 120 mL/hr at 07/18/18 1913, 2 g at 07/18/18 1913    0.9% sodium chloride infusion 2,000 mL, 2,000 mL, IntraVENous, EVERY OTHER DAY, James Bravo MD, 2,000 mL at 07/18/18 1912    anticoagulant citrate dextrose (ACD-A) solution, 1,000 mL, In Vitro, EVERY OTHER DAY, James Bravo MD, 1,000 mL at 07/18/18 1900    lidocaine (XYLOCAINE) 10 mg/mL (1 %) injection 20 mL, 20 mL, SubCUTAneous, Multiple, Rebecca Rossi MD    promethazine (PHENERGAN) 12.5 mg in 0.9% sodium chloride 50 mL IVPB, 12.5 mg, IntraVENous, Q4H PRN, Nickolas BEAULIEU Do, MD, Last Rate: 200 mL/hr at 07/18/18 1952, 12.5 mg at 07/18/18 1952    diphenhydrAMINE (BENADRYL) capsule 50 mg, 50 mg, Oral, Q6H PRN, Laurie Mejia MD, 50 mg at 07/18/18 1859    sorbitol 70 % solution 30 mL, 30 mL, Oral, DAILY PRN, Jamison Rich MD, 30 mL at 07/16/18 1007    zolpidem (AMBIEN) tablet 5 mg, 5 mg, Oral, QHS PRN, Laurie Mejia MD, 5 mg at 07/19/18 0049    senna-docusate (PERICOLACE) 8.6-50 mg per tablet 1 Tab, 1 Tab, Oral, BID, Laurie Mejia MD, 1 Tab at 07/19/18 1000    zolpidem (AMBIEN) tablet 5 mg, 5 mg, Oral, QHS, Laurie Mejia MD, 5 mg at 07/18/18 2121    FLUoxetine (PROzac) capsule 20 mg, 20 mg, Oral, QHS, Laurie Mejia MD, 20 mg at 07/18/18 2121    sodium chloride (NS) flush 5-10 mL, 5-10 mL, IntraVENous, Q8H, Aura Samuel MD, 10 mL at 07/19/18 0704    sodium chloride (NS) flush 5-10 mL, 5-10 mL, IntraVENous, PRN, Aura Samuel MD, 10 mL at 07/09/18 1400    acetaminophen (TYLENOL) tablet 650 mg, 650 mg, Oral, Q4H PRN, Aura Samuel MD, 650 mg at 07/10/18 0658    enoxaparin (LOVENOX) injection 40 mg, 40 mg, SubCUTAneous, Q24H, Aura Samuel MD, 40 mg at 07/18/18 1720    polyethylene glycol (MIRALAX) packet 17 g, 17 g, Oral, BID, Aura Samuel MD, 17 g at 07/19/18 1000    Physical Exam    Patient Vitals for the past 12 hrs:   Temp Pulse Resp BP SpO2   07/19/18 0726 98.4 °F (36.9 °C) (!) 108 20 128/76 96 %   07/19/18 0700 - 96 - - -   07/19/18 0319 98.2 °F (36.8 °C) (!) 109 20 128/82 95 %   07/18/18 2337 - (!) 111 - - -   07/18/18 2307 98.9 °F (37.2 °C) (!) 115 20 134/72 95 %       Awake, resting in bed    Focused Neurological Exam   Extraocular movement intact bilaterally  Face appears symmetric  Hearing / Language intact to casual conversation    Sensory:   Left lower ext: absent pinprick/temp to level of T12  Right lower ext: intact LT, prick dec to mid thigh    Motor:   5/5 strength both arms  4/5 right leg  Left leg with at least 4/5 strength distally, 4/5 proximally in hip  Gait: not tested      Impression/ Plan    34 y.o. female with   Acute onset left leg weakness, severe lower back pain, right leg tingling/ paresthesias, urinary retention, generalized hyperreflexia    Repeat MRIs Brain, Cervical, Thoracic, Lumbar spine  (all with contrast) have not shown etiology (specifically no cord or CNS lesions, no spinal stenosis, no disc herniation). LP with minimal RBCs (not suggestive of SAH) and no evidence of infection or inflammation (normal WBCs, Protein, Glucose, Gram stain, HSV PCR). MS Panel was negative. However NMO was positive. 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 Pt has completed IV solumedrol so will now consult nephrology and initiate PLEX with a plan for 5 exchanges. She has completed 3/5. Cont Lyrica to 200mg TID. Pt is max dose with this. Cont with palliative for pain management. Pt may need stronger treatment than oral meds are providing. However, this patient's primary and I are concerned by giving her IV opiates. Will defer to palliative regarding this. I do suspect patient significant neuropathic pain, but I am hopeful that the Lyrica will help with this. Discussed with Dr. Mayela Menjivar. Bowels improving. Will monitor    Will cont to monitor heart rate with remote telemetry as treatment is initiated. Leg swelling: no sign of DVT, ?if PLEX is causing this at all. Will do IV lasix x 1 today    New complaint of urinary retention.  Will do bladder scan today. Pt also developed thrush likely secondary to steroids. Primary to treat today. Reviewed the diagnoses and treatment plans with patient in detail at the bedside    Will follow closely with you. Signed By: Gualberto Edwards MD     7-19-18      Over 35 minutes was spent reviewing records, discussing with Dr. Dennis Roldan and nursing, obtaining history, examining patient and discussing treatment plans.

## 2018-07-19 NOTE — PROGRESS NOTES
Problem: Falls - Risk of  Goal: *Absence of Falls  Document Awa Fall Risk and appropriate interventions in the flowsheet.    Outcome: Progressing Towards Goal  Fall Risk Interventions:  Mobility Interventions: Bed/chair exit alarm, Patient to call before getting OOB, Utilize walker, cane, or other assistive device, Utilize gait belt for transfers/ambulation         Medication Interventions: Bed/chair exit alarm, Patient to call before getting OOB, Teach patient to arise slowly, Utilize gait belt for transfers/ambulation    Elimination Interventions: Bed/chair exit alarm, Call light in reach, Patient to call for help with toileting needs    History of Falls Interventions: Bed/chair exit alarm, Door open when patient unattended, Utilize gait belt for transfer/ambulation

## 2018-07-19 NOTE — PROGRESS NOTES
Problem: Mobility Impaired (Adult and Pediatric)  Goal: *Acute Goals and Plan of Care (Insert Text)  Physical Therapy Goals  Initiated 7/10/2018 Re-evaluation 7/19/2018 - no goals achieved - all goals remain appropriate  1. Patient will move from supine to sit and sit to supine  in bed with modified independence within 7 day(s). 2.  Patient will transfer from bed to chair and chair to bed with modified independence using the least restrictive device within 7 day(s). 3.  Patient will perform sit to stand with supervision/set-up within 7 day(s). 4.  Patient will ambulate with minimal assistance/contact guard assist for 150 feet with the least restrictive device within 7 day(s). 5.  Patient will ascend/descend 4 stairs with 2 handrail(s) with minimal assistance/contact guard assist within 7 day(s). physical Therapy TREATMENT  Patient: Jesse Bradshaw (55 y.o. 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 ASSESSMENT:  Patient received sitting up in chair, she was tearful today about her bladder issues and is retaining more fluid which is making her pain and discomfort higher. She is rating her pain 10/10 throughout the session, nursing present durign session to administer PO medication, with IV patient medications due at 11 am.  She was premedicated with the IV meds prior to PT session. Today's activities were modified due to her pain, focus on functional strengthening with repeat sit-stands and short distance ambulation with RW. Patient was encouraged to attempt toileting schedule (every 2 hours). She today was able to tolerate all activity at a Supervision-CGA level.  She demonstrates no incidence of knee buckling or loss of balance with upright activity. Patient was returned to supine due to self report of increased fatigue. Patient able to tolerate LE supine exercises, and exercise handout given. Patient encouraged to keep opposing leg in flexion of \"exercising\" leg to decreased strain on lumbar spine. She verbalized understanding. Patient able to demonstrate log roll with bed mobility with good adherence. Progression toward goals:  [x]    Improving appropriately and progressing toward goals  []    Improving slowly and progressing toward goals  []    Not making progress toward goals and plan of care will be adjusted     PLAN:  Patient continues to benefit from skilled intervention to address the above impairments. Continue treatment per established plan of care. Discharge Recommendations:  Rehab  Further Equipment Recommendations for Discharge:  TBD at rehab     SUBJECTIVE:   Patient stated its always something new that is wrong.     OBJECTIVE DATA SUMMARY:   Critical Behavior:  Neurologic State: Alert  Orientation Level: Oriented X4  Cognition: Appropriate safety awareness, Appropriate for age attention/concentration, Appropriate decision making  Safety/Judgement: Awareness of environment  Functional Mobility Training:  Bed Mobility:        Sit to Supine: Supervision (using log roll technique)           Transfers:  Sit to Stand: Contact guard assistance  Stand to Sit: Contact guard assistance        Bed to Chair: Contact guard assistance                    Balance:  Sitting: Intact  Standing: Intact; With support  Standing - Static: Constant support  Ambulation/Gait Training:  Distance (ft): 40 Feet (ft)  Assistive Device: Walker, rolling;Gait belt  Ambulation - Level of Assistance: Contact guard assistance   able to ambulate to bathroom and back in room.  No knee buckling, instability or loss of balance        Stairs:              Neuro Re-Education:  Patient performed repeat sit-stand with 1 min duration standing. Repeated 3x  No knee buckling, instability or loss of balance  Therapeutic Exercises:   Supine= SAQ, hip ABD, heel slides  1 set of 10  Patient given exercise handout and instructed to perform 3x/day as a HEP    Pain:  Pain Scale 1: Numeric (0 - 10)  Pain Intensity 1: 10  Pain Location 1: Back  Pain Orientation 1: Lower  Pain Description 1: Aching  Pain Intervention(s) 1: Medication (see MAR)  Activity Tolerance:   fair  Please refer to the flowsheet for vital signs taken during this treatment.   After treatment:   []    Patient left in no apparent distress sitting up in chair  [x]    Patient left in no apparent distress in bed  [x]    Call bell left within reach  [x]    Nursing notified  [x]    Caregiver present  []    Bed alarm activated    COMMUNICATION/COLLABORATION:   The patients plan of care was discussed with: Occupational Therapist and Registered Nurse    Jessica West PT, DPT   Time Calculation: 38 mins

## 2018-07-19 NOTE — PROGRESS NOTES
Neurology Progress Note     Interval Hx:       Follow up: Acute onset left leg weakness, severe lower back pain, right leg tingling/ paresthesias, urinary retention/incontinence, generalized hyperreflexia.     Subjective:  Pt with pain and still no improvement despite 2 rounds of PLEX         Lab review:  So far CSF has returned normal (WBC 4, Protein 38, Gram stain negative to date, HSV PCR negative), other than few hundred RBCs which is likely d/t traumatic tap. MS panel neg.     CRP, CBC normal (other than mildly elevated Abs Lymphocytes 3.9).    Other labs: ISABELLA, Copper, anti-ds DNA, urine heavy metals, lyme antibodies, SSA, SSB, were all negative.   NMO antibody was positive at >6           Current Facility-Administered Medications:     nystatin (MYCOSTATIN) 100,000 unit/mL oral suspension 500,000 Units, 500,000 Units, Oral, QID, Kell Oden MD, 500,000 Units at 07/17/18 0940    pregabalin (LYRICA) capsule 200 mg, 200 mg, Oral, TID, Collette Gaucher, MD, 200 mg at 07/17/18 0934    HYDROmorphone (PF) (DILAUDID) injection 1 mg, 1 mg, IntraVENous, U1O PRN, Lukas Graf MD, 1 mg at 07/17/18 0940    HYDROmorphone (DILAUDID) tablet 2 mg, 2 mg, Oral, B4Y PRN, Lukas Graf MD, 2 mg at 07/17/18 0453    calcium gluconate 1 g in 0.9% sodium chloride 100 mL IVPB, 1 g, IntraVENous, PRN, Melody Miner MD    albumin human 5% (BUMINATE) solution 100 g, 2,000 mL, IntraVENous, EVERY OTHER DAY, Melody Miner MD, 100 g at 07/16/18 1438    calcium gluconate 2 g in 0.9% sodium chloride 100 mL IVPB, 2 g, IntraVENous, EVERY OTHER DAY, Melody Miner MD, Last Rate: 120 mL/hr at 07/16/18 1438, 2 g at 07/16/18 1438    0.9% sodium chloride infusion 2,000 mL, 2,000 mL, IntraVENous, EVERY OTHER DAY, Melody Miner MD, 2,000 mL at 07/16/18 1439    anticoagulant citrate dextrose (ACD-A) solution, 1,000 mL, In Vitro, EVERY OTHER DAY, Melody Miner MD, 1,000 mL at 07/16/18 1526    lidocaine (XYLOCAINE) 10 mg/mL (1 %) injection 20 mL, 20 mL, SubCUTAneous, Multiple, Delores Owens MD    promethazine (PHENERGAN) 12.5 mg in 0.9% sodium chloride 50 mL IVPB, 12.5 mg, IntraVENous, Q4H PRN, Nickolas BEAULIEU Do, MD, Last Rate: 200 mL/hr at 07/16/18 1438, 12.5 mg at 07/16/18 1438    diphenhydrAMINE (BENADRYL) capsule 50 mg, 50 mg, Oral, Q6H PRN, Katie Lyon MD, 50 mg at 07/16/18 1346    sorbitol 70 % solution 30 mL, 30 mL, Oral, DAILY PRN, Rogerio Shell MD, 30 mL at 07/16/18 1007    zolpidem (AMBIEN) tablet 5 mg, 5 mg, Oral, QHS PRN, Katie Lyon MD, 5 mg at 07/15/18 2145    senna-docusate (PERICOLACE) 8.6-50 mg per tablet 1 Tab, 1 Tab, Oral, BID, Katie Lyon MD, 1 Tab at 07/17/18 0934    zolpidem (AMBIEN) tablet 5 mg, 5 mg, Oral, QHS, Katie Lyon MD, 5 mg at 07/16/18 2125    FLUoxetine (PROzac) capsule 20 mg, 20 mg, Oral, QHS, Katie Lyon MD, 20 mg at 07/16/18 2125    sodium chloride (NS) flush 5-10 mL, 5-10 mL, IntraVENous, Q8H, Tess Santana MD, 10 mL at 07/17/18 0521    sodium chloride (NS) flush 5-10 mL, 5-10 mL, IntraVENous, PRN, Tess Santana MD, 10 mL at 07/09/18 1400    acetaminophen (TYLENOL) tablet 650 mg, 650 mg, Oral, Q4H PRN, Tess Santana MD, 650 mg at 07/10/18 0658    enoxaparin (LOVENOX) injection 40 mg, 40 mg, SubCUTAneous, Q24H, Tess Santana MD, 40 mg at 07/16/18 1819    polyethylene glycol (MIRALAX) packet 17 g, 17 g, Oral, BID, Tess Santana MD, 17 g at 07/17/18 0934     Physical Exam     Patient Vitals for the past 12 hrs:    Temp Pulse Resp BP SpO2   07/17/18 0728 97.7 °F (36.5 °C) (!) 111 18 117/74 98 %   07/17/18 0709 - (!) 108 - - -   07/17/18 0427 98.4 °F (36.9 °C) (!) 126 16 124/78 98 %         Awake, resting in bed     Focused Neurological Exam   Extraocular movement intact bilaterally  Face appears symmetric  Hearing / Language intact to casual conversation    Sensory:   Left lower ext: absent pinprick/temp to level of T12  Right lower ext: intact LT, prick dec to mid thigh     Motor:   5/5 strength both arms  4/5 right leg  Left leg with at least 3/5 strength distally, 4/5 proximally in hip  Gait: not tested        Impression/ Plan     34 y.o. female with   Acute onset left leg weakness, severe lower back pain, right leg tingling/ paresthesias, urinary retention, generalized hyperreflexia     Repeat MRIs Brain, Cervical, Thoracic, Lumbar spine  (all with contrast) have not shown etiology (specifically no cord or CNS lesions, no spinal stenosis, no disc herniation).      LP with minimal RBCs (not suggestive of SAH) and no evidence of infection or inflammation (normal WBCs, Protein, Glucose, Gram stain, HSV PCR). MS Panel was negative. However NMO was positive. 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     Pt has completed IV solumedrol so will now consult nephrology and initiate PLEX with a plan for 5 exchanges. She has completed 2/5.      Cont Lyrica to 200mg TID     Cont with palliative for pain management. Pt may need stronger treatment than oral meds are providing. However, this patient's primary and I are concerned by giving her IV opiates. Will defer to palliative regarding this. I do suspect patient significant neuropathic pain, but I am hopeful that the Lyrica will help with this. Discussed with Dr. Peace De Paz.      Cont aggressive bowel regimen as patient has not had a BM in a week. I suspect the pain medication could be contributing to this. Dr. Peace De Paz reports patient is having BMs but they aren't well formed.      Will monitor heart rate with remote telemetry as treatment is initiated.     Checking for DVT today due to leg swelling.     Pt also developed thrush likely secondary to steroids.  Primary to treat today.     Reviewed the diagnoses and treatment plans with patient in detail at the bedside     Will follow closely with you.      Signed By: Masoud San MD      July 17, 2018       Over 35 minutes was spent reviewing records, discussing with Dr. Rossi Miller and nursing, obtaining history, examining patient and discussing treatment plans.

## 2018-07-19 NOTE — PROGRESS NOTES
Bedside and Verbal shift change report given to Mary Huggins RN (oncoming nurse) by Young Bazzi RN (offgoing nurse). Report included the following information SBAR, Kardex, Intake/Output, MAR, Recent Results and Med Rec Status.

## 2018-07-19 NOTE — PROGRESS NOTES
Bedside shift change report given to Barrett Wilder (oncoming nurse) by Gianluca Davidson (offgoing nurse). Report included the following information SBAR, Kardex, MAR and Recent Results.

## 2018-07-19 NOTE — ROUTINE PROCESS
0930 Patient was bladder scanned and had 721mL. Encouraged the patient to sit on the toilet to attempt to void. 1000 PT in with the patient and will take her to the bathroom to attempt to void. Will notify Dr. Shon Mensah after attempt for further orders. 56 Notified Dr. Shon Mensah, will straight cath. 1045 Straight cathed and removed 1000mL. 1517 Patient complains of nausea throughout the day without any relief. Notified Dr Shon Mensah who will put in the order for zofran.

## 2018-07-20 LAB
ALBUMIN SERPL-MCNC: 3.2 G/DL (ref 3.5–5)
ALBUMIN/GLOB SERPL: 1.8 {RATIO} (ref 1.1–2.2)
ALP SERPL-CCNC: 43 U/L (ref 45–117)
ALT SERPL-CCNC: 240 U/L (ref 12–78)
ANION GAP SERPL CALC-SCNC: 5 MMOL/L (ref 5–15)
AST SERPL-CCNC: 128 U/L (ref 15–37)
BASOPHILS # BLD: 0 K/UL (ref 0–0.1)
BASOPHILS NFR BLD: 0 % (ref 0–1)
BILIRUB SERPL-MCNC: 0.5 MG/DL (ref 0.2–1)
BUN SERPL-MCNC: 8 MG/DL (ref 6–20)
BUN/CREAT SERPL: 9 (ref 12–20)
CALCIUM SERPL-MCNC: 8.5 MG/DL (ref 8.5–10.1)
CHLORIDE SERPL-SCNC: 104 MMOL/L (ref 97–108)
CO2 SERPL-SCNC: 30 MMOL/L (ref 21–32)
CREAT SERPL-MCNC: 0.89 MG/DL (ref 0.55–1.02)
DIFFERENTIAL METHOD BLD: ABNORMAL
EOSINOPHIL # BLD: 0.8 K/UL (ref 0–0.4)
EOSINOPHIL NFR BLD: 6 % (ref 0–7)
ERYTHROCYTE [DISTWIDTH] IN BLOOD BY AUTOMATED COUNT: 13.4 % (ref 11.5–14.5)
FIBRINOGEN PPP-MCNC: 235 MG/DL (ref 200–475)
GLOBULIN SER CALC-MCNC: 1.8 G/DL (ref 2–4)
GLUCOSE SERPL-MCNC: 110 MG/DL (ref 65–100)
HCT VFR BLD AUTO: 36.9 % (ref 35–47)
HGB BLD-MCNC: 12 G/DL (ref 11.5–16)
IMM GRANULOCYTES # BLD: 0 K/UL
IMM GRANULOCYTES NFR BLD AUTO: 0 %
INR PPP: 1 (ref 0.9–1.1)
LYMPHOCYTES # BLD: 3.4 K/UL (ref 0.8–3.5)
LYMPHOCYTES NFR BLD: 25 % (ref 12–49)
MCH RBC QN AUTO: 32.1 PG (ref 26–34)
MCHC RBC AUTO-ENTMCNC: 32.5 G/DL (ref 30–36.5)
MCV RBC AUTO: 98.7 FL (ref 80–99)
MONOCYTES # BLD: 0.8 K/UL (ref 0–1)
MONOCYTES NFR BLD: 6 % (ref 5–13)
NEUTS SEG # BLD: 8.5 K/UL (ref 1.8–8)
NEUTS SEG NFR BLD: 63 % (ref 32–75)
NRBC # BLD: 0 K/UL (ref 0–0.01)
NRBC BLD-RTO: 0 PER 100 WBC
PLATELET # BLD AUTO: 131 K/UL (ref 150–400)
PMV BLD AUTO: 10.2 FL (ref 8.9–12.9)
POTASSIUM SERPL-SCNC: 4.3 MMOL/L (ref 3.5–5.1)
PROT SERPL-MCNC: 5 G/DL (ref 6.4–8.2)
PROTHROMBIN TIME: 10.2 SEC (ref 9–11.1)
RBC # BLD AUTO: 3.74 M/UL (ref 3.8–5.2)
RBC MORPH BLD: ABNORMAL
SODIUM SERPL-SCNC: 139 MMOL/L (ref 136–145)
WBC # BLD AUTO: 13.5 K/UL (ref 3.6–11)

## 2018-07-20 PROCEDURE — 77030038269 HC DRN EXT URIN PURWCK BARD -A

## 2018-07-20 PROCEDURE — 85610 PROTHROMBIN TIME: CPT | Performed by: STUDENT IN AN ORGANIZED HEALTH CARE EDUCATION/TRAINING PROGRAM

## 2018-07-20 PROCEDURE — 36415 COLL VENOUS BLD VENIPUNCTURE: CPT | Performed by: STUDENT IN AN ORGANIZED HEALTH CARE EDUCATION/TRAINING PROGRAM

## 2018-07-20 PROCEDURE — 65660000000 HC RM CCU STEPDOWN

## 2018-07-20 PROCEDURE — 74011250637 HC RX REV CODE- 250/637: Performed by: FAMILY MEDICINE

## 2018-07-20 PROCEDURE — 74011250636 HC RX REV CODE- 250/636: Performed by: INTERNAL MEDICINE

## 2018-07-20 PROCEDURE — 74011000258 HC RX REV CODE- 258: Performed by: INTERNAL MEDICINE

## 2018-07-20 PROCEDURE — 85384 FIBRINOGEN ACTIVITY: CPT | Performed by: STUDENT IN AN ORGANIZED HEALTH CARE EDUCATION/TRAINING PROGRAM

## 2018-07-20 PROCEDURE — 74011250637 HC RX REV CODE- 250/637: Performed by: PSYCHIATRY & NEUROLOGY

## 2018-07-20 PROCEDURE — 85025 COMPLETE CBC W/AUTO DIFF WBC: CPT | Performed by: INTERNAL MEDICINE

## 2018-07-20 PROCEDURE — 51798 US URINE CAPACITY MEASURE: CPT

## 2018-07-20 PROCEDURE — 74011250637 HC RX REV CODE- 250/637: Performed by: INTERNAL MEDICINE

## 2018-07-20 PROCEDURE — 74011250636 HC RX REV CODE- 250/636: Performed by: FAMILY MEDICINE

## 2018-07-20 PROCEDURE — 97110 THERAPEUTIC EXERCISES: CPT

## 2018-07-20 PROCEDURE — 74011250636 HC RX REV CODE- 250/636: Performed by: PSYCHIATRY & NEUROLOGY

## 2018-07-20 PROCEDURE — P9045 ALBUMIN (HUMAN), 5%, 250 ML: HCPCS | Performed by: INTERNAL MEDICINE

## 2018-07-20 PROCEDURE — 36514 APHERESIS PLASMA: CPT

## 2018-07-20 PROCEDURE — 80053 COMPREHEN METABOLIC PANEL: CPT | Performed by: INTERNAL MEDICINE

## 2018-07-20 PROCEDURE — 97116 GAIT TRAINING THERAPY: CPT

## 2018-07-20 RX ORDER — FUROSEMIDE 10 MG/ML
20 INJECTION INTRAMUSCULAR; INTRAVENOUS ONCE
Status: COMPLETED | OUTPATIENT
Start: 2018-07-20 | End: 2018-07-20

## 2018-07-20 RX ADMIN — DIPHENHYDRAMINE HYDROCHLORIDE 50 MG: 25 CAPSULE ORAL at 09:29

## 2018-07-20 RX ADMIN — POLYETHYLENE GLYCOL (3350) 17 G: 17 POWDER, FOR SOLUTION ORAL at 18:03

## 2018-07-20 RX ADMIN — POLYETHYLENE GLYCOL (3350) 17 G: 17 POWDER, FOR SOLUTION ORAL at 09:12

## 2018-07-20 RX ADMIN — NYSTATIN 500000 UNITS: 100000 SUSPENSION ORAL at 09:12

## 2018-07-20 RX ADMIN — Medication 10 ML: at 14:05

## 2018-07-20 RX ADMIN — PROMETHAZINE HYDROCHLORIDE 12.5 MG: 25 INJECTION INTRAMUSCULAR; INTRAVENOUS at 09:29

## 2018-07-20 RX ADMIN — NYSTATIN 500000 UNITS: 100000 SUSPENSION ORAL at 18:03

## 2018-07-20 RX ADMIN — ENOXAPARIN SODIUM 40 MG: 40 INJECTION SUBCUTANEOUS at 18:03

## 2018-07-20 RX ADMIN — HYDROMORPHONE HYDROCHLORIDE 1 MG: 2 INJECTION, SOLUTION INTRAMUSCULAR; INTRAVENOUS; SUBCUTANEOUS at 06:54

## 2018-07-20 RX ADMIN — HYDROMORPHONE HYDROCHLORIDE 2 MG: 2 TABLET ORAL at 00:55

## 2018-07-20 RX ADMIN — HYDROMORPHONE HYDROCHLORIDE 1 MG: 2 INJECTION, SOLUTION INTRAMUSCULAR; INTRAVENOUS; SUBCUTANEOUS at 16:08

## 2018-07-20 RX ADMIN — HYDROMORPHONE HYDROCHLORIDE 2 MG: 2 TABLET ORAL at 09:12

## 2018-07-20 RX ADMIN — CALCIUM GLUCONATE 2 G: 94 INJECTION, SOLUTION INTRAVENOUS at 09:29

## 2018-07-20 RX ADMIN — HYDROMORPHONE HYDROCHLORIDE 2 MG: 2 TABLET ORAL at 18:03

## 2018-07-20 RX ADMIN — PREGABALIN 200 MG: 100 CAPSULE ORAL at 16:08

## 2018-07-20 RX ADMIN — PREGABALIN 200 MG: 100 CAPSULE ORAL at 09:12

## 2018-07-20 RX ADMIN — HYDROMORPHONE HYDROCHLORIDE 2 MG: 2 TABLET ORAL at 22:05

## 2018-07-20 RX ADMIN — HYDROMORPHONE HYDROCHLORIDE 2 MG: 2 TABLET ORAL at 05:02

## 2018-07-20 RX ADMIN — NYSTATIN 500000 UNITS: 100000 SUSPENSION ORAL at 22:06

## 2018-07-20 RX ADMIN — PREGABALIN 200 MG: 100 CAPSULE ORAL at 22:05

## 2018-07-20 RX ADMIN — FUROSEMIDE 20 MG: 10 INJECTION, SOLUTION INTRAVENOUS at 12:05

## 2018-07-20 RX ADMIN — NYSTATIN 500000 UNITS: 100000 SUSPENSION ORAL at 14:05

## 2018-07-20 RX ADMIN — HYDROMORPHONE HYDROCHLORIDE 2 MG: 2 TABLET ORAL at 14:04

## 2018-07-20 RX ADMIN — SENNOSIDES AND DOCUSATE SODIUM 1 TABLET: 8.6; 5 TABLET ORAL at 18:03

## 2018-07-20 RX ADMIN — SENNOSIDES AND DOCUSATE SODIUM 1 TABLET: 8.6; 5 TABLET ORAL at 09:12

## 2018-07-20 RX ADMIN — Medication 10 ML: at 06:52

## 2018-07-20 RX ADMIN — FLUOXETINE 20 MG: 20 CAPSULE ORAL at 22:05

## 2018-07-20 RX ADMIN — HYDROMORPHONE HYDROCHLORIDE 1 MG: 2 INJECTION, SOLUTION INTRAMUSCULAR; INTRAVENOUS; SUBCUTANEOUS at 03:02

## 2018-07-20 RX ADMIN — HYDROMORPHONE HYDROCHLORIDE 1 MG: 2 INJECTION, SOLUTION INTRAMUSCULAR; INTRAVENOUS; SUBCUTANEOUS at 12:05

## 2018-07-20 RX ADMIN — ALBUMIN (HUMAN) 100 G: 12.5 INJECTION, SOLUTION INTRAVENOUS at 09:30

## 2018-07-20 RX ADMIN — Medication 10 ML: at 20:03

## 2018-07-20 RX ADMIN — HYDROMORPHONE HYDROCHLORIDE 1 MG: 2 INJECTION, SOLUTION INTRAMUSCULAR; INTRAVENOUS; SUBCUTANEOUS at 20:03

## 2018-07-20 RX ADMIN — ZOLPIDEM TARTRATE 5 MG: 5 TABLET ORAL at 22:05

## 2018-07-20 RX ADMIN — Medication 10 ML: at 03:05

## 2018-07-20 NOTE — PROGRESS NOTES
Problem: Mobility Impaired (Adult and Pediatric)  Goal: *Acute Goals and Plan of Care (Insert Text)  Physical Therapy Goals  Initiated 7/10/2018 Re-evaluation 7/19/2018 - no goals achieved - all goals remain appropriate  1. Patient will move from supine to sit and sit to supine  in bed with modified independence within 7 day(s). 2.  Patient will transfer from bed to chair and chair to bed with modified independence using the least restrictive device within 7 day(s). 3.  Patient will perform sit to stand with supervision/set-up within 7 day(s). 4.  Patient will ambulate with minimal assistance/contact guard assist for 150 feet with the least restrictive device within 7 day(s). 5.  Patient will ascend/descend 4 stairs with 2 handrail(s) with minimal assistance/contact guard assist within 7 day(s). physical Therapy TREATMENT  Patient: Salma Julien (66 y.o. 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 ASSESSMENT:   Pt agreeable to treatment in spite of being fatigued. Pain level in low back 9/10  with report of pain increasing during ambulation. Pt demonstrating good technique with log roll during bed mobility, movement patterns performed slow and steady observing good safety practices. Pt reports numbness & tingling in BLE's L>R and is concerned about LE's buckling during ambulation of 80 feet with RW at [facility]. Pt demonstrating step to gt pattern, good walker management without LOB or buckling noted. Pt with reports of feeling dizzy and nauseous post ambulation;  /74   bpm.  Following seated rest pt agreeable to participate with supine LE exercises.   Pt is very motivated and with good progress toward goals. Progression toward goals:  [x]    Improving appropriately and progressing toward goals  []    Improving slowly and progressing toward goals  []    Not making progress toward goals and plan of care will be adjusted     PLAN:  Patient continues to benefit from skilled intervention to address the above impairments. Continue treatment per established plan of care. Discharge Recommendations:  Rehab  Further Equipment Recommendations for Discharge:   TBD at Rehab     SUBJECTIVE:   Patient stated Latoya Quijano may send me to [facility] where they have a better MRI.     OBJECTIVE DATA SUMMARY:   Critical Behavior:  Neurologic State: Alert  Orientation Level: Oriented X4  Cognition: Appropriate safety awareness, Appropriate for age attention/concentration, Appropriate decision making  Safety/Judgement: Awareness of environment  Functional Mobility Training:  Bed Mobility:  Rolling: Modified independent  Supine to Sit: Modified independent (log roll technique)  Sit to Supine: Modified independent           Transfers:  Sit to Stand: Stand-by assistance  Stand to Sit: Stand-by assistance                             Balance:  Sitting: Intact; With support  Standing: Intact; With support  Standing - Static: Good  Standing - Dynamic : Good  Ambulation/Gait Training:  Distance (ft): 80 Feet (ft)  Assistive Device: Walker, rolling;Gait belt  Ambulation - Level of Assistance: Contact guard assistance        Gait Abnormalities: Step to gait              Speed/Annetta: Slow                      Therapeutic Exercises: Ankle pumps, hip abd/add, heel slides and SAQ x 10 reps  Pain:  Pain Scale 1: Numeric (0 - 10)  Pain Intensity 1: 10  Pain Location 1: Back  Pain Orientation 1: Lower  Pain Description 1: Aching; Throbbing  Pain Intervention(s) 1: Medication (see MAR); Distraction; Emotional support; Rest  Activity Tolerance:   Fair. Please refer to the flowsheet for vital signs taken during this treatment.   After treatment:   []    Patient left in no apparent distress sitting up in chair  [x]    Patient left in no apparent distress in bed  [x]    Call bell left within reach  [x]    Nursing notified  [x]    Caregiver present  []    Bed alarm activated    COMMUNICATION/COLLABORATION:   The patients plan of care was discussed with: Registered Nurse    Sabrina Jaeger PTA   Time Calculation: 31 mins

## 2018-07-20 NOTE — PROCEDURES
[facility] Acutes   [phone]   Vitals Pre Post Assessment Pre Post   /60 114/65 LOC A&OX4 A&OX4    118 Lungs clear clear   Temp 98.5 99.2 Cardiac Sinus tach Sinus tach   Resp 18 18 Skin intact intact   Weight 113.8 kg  Edema 1-2+PAYTON same   Height 5'6\"  Pain 10 10     Plasmapheresis Orders   Product:    Albumin 5% 2000 ml, Normal Saline 2000 ml                                               Volume:      4000 ml                                                 Duration: Start: 0956 End: 1129 Total: 86 mins     Fluids    Volume Processed: 9142   Plasma Removed: 4274   Replacement Fluid: Albumin 5%/ NS   Total Citrate: 753   NS: 0   Fluid Balance: 100%     Access   Type & Location: [facility] destini. of Destini/permcath disinfected with Alcohol per policy. Each lumen aspirated for blood return and flushed with Normal Saline per policy. Dialysis initiated. Comments:                                 Meds Given   Name Dose Route   Phenergan 12.5 mg ivpb/0929   Benadryl 50 mg Po/0929   Calcium Gluconate 2 grams ivpb/0929   Albumin 100 grams Iv/0929     Labs   Obtained/Reviewed  Critical Results Called reviewed       Comments      Catheter was positional, so BFR decreased to accomodate. Iceboat done pre tx and patient given premeds. No other problems during tx. Post tx:Left pts room c pt in no new acute distress and denying complaints c stable BP, bed in lowest position c side rails upx3, wheels lockedx4, and call bell within reach. Report to unit nurse, Bartolome Doshi RN, using SBAR.

## 2018-07-20 NOTE — PROGRESS NOTES
7/20/2018   CARE MANAGEMENT NOTE:  CM reviewed EMR for clinical updates and recommendations by Neuro. CM re-submitted a referral to [address] [place] [address] on 7/18 and they continue to follow without any conclusive acceptance or denial.  When speaking with [facility] admissions coordinator today it was pointed out that pt has [employer] and without a definitive dx it will be difficult to obtain an insurance authorization. Per chart, Neuro will f/u on Monday and decision will be made re: pt's possible transfer to another facility I.e [facility].   D.Lara

## 2018-07-20 NOTE — PROGRESS NOTES
Gabino Higuera [address] Monica Bateman YOB: 1990 Assessment & Plan:  
NMO 
· Seen on pheresis, stephy well · Pheresis 5/5 planned for Friday · Fibrinogen ok, replacing with Albumin Subjective:  
CC: F/u NMO 
HPI: Seen on pheresis. 1PV replace with albumin. Stephy well Current Facility-Administered Medications Medication Dose Route Frequency  nystatin (MYCOSTATIN) 100,000 unit/mL oral suspension 500,000 Units  500,000 Units Oral QID  pregabalin (LYRICA) capsule 200 mg  200 mg Oral TID  
 HYDROmorphone (PF) (DILAUDID) injection 1 mg  1 mg IntraVENous Q4H PRN  
 HYDROmorphone (DILAUDID) tablet 2 mg  2 mg Oral Q4H PRN  
 calcium gluconate 1 g in 0.9% sodium chloride 100 mL IVPB  1 g IntraVENous PRN  
 albumin human 5% (BUMINATE) solution 100 g  2,000 mL IntraVENous EVERY OTHER DAY  calcium gluconate 2 g in 0.9% sodium chloride 100 mL IVPB  2 g IntraVENous EVERY OTHER DAY  
 0.9% sodium chloride infusion 2,000 mL  2,000 mL IntraVENous EVERY OTHER DAY  anticoagulant citrate dextrose (ACD-A) solution  1,000 mL In Vitro EVERY OTHER DAY  lidocaine (XYLOCAINE) 10 mg/mL (1 %) injection 20 mL  20 mL SubCUTAneous Multiple  promethazine (PHENERGAN) 12.5 mg in 0.9% sodium chloride 50 mL IVPB  12.5 mg IntraVENous Q4H PRN  
 diphenhydrAMINE (BENADRYL) capsule 50 mg  50 mg Oral Q6H PRN  
 sorbitol 70 % solution 30 mL  30 mL Oral DAILY PRN  
 zolpidem (AMBIEN) tablet 5 mg  5 mg Oral QHS PRN  
 senna-docusate (PERICOLACE) 8.6-50 mg per tablet 1 Tab  1 Tab Oral BID  zolpidem (AMBIEN) tablet 5 mg  5 mg Oral QHS  FLUoxetine (PROzac) capsule 20 mg  20 mg Oral QHS  sodium chloride (NS) flush 5-10 mL  5-10 mL IntraVENous Q8H  
 sodium chloride (NS) flush 5-10 mL  5-10 mL IntraVENous PRN  
 acetaminophen (TYLENOL) tablet 650 mg  650 mg Oral Q4H PRN  
 enoxaparin (LOVENOX) injection 40 mg  40 mg SubCUTAneous Q24H  polyethylene glycol (MIRALAX) packet 17 g  17 g Oral BID Objective:  
 
Vitals: 
Blood pressure 128/53, pulse (!) 111, temperature 98.5 °F (36.9 °C), resp. rate 18, height 5' 6\" (1.676 m), weight 113.8 kg (250 lb 14.1 oz), SpO2 92 %, not currently breastfeeding. Temp (24hrs), Av.4 °F (36.9 °C), Min:98.1 °F (36.7 °C), Max:98.7 °F (37.1 °C) Intake and Output: 
  
 1901 -  0700 In: 369 [I.V.:369] Out: 7828 [FETQA:0982] Physical Exam:               
GENERAL ASSESSMENT: NAD EXTREMITY: no EDEMA 
 
 
   
ECG/rhythm: 
 
Data Review No results for input(s): TNIPOC in the last 72 hours. No lab exists for component: ITNL No results for input(s): CPK, CKMB, TROIQ in the last 72 hours. Recent Labs  
   188  18 
 0246 NA  139  139  140  
K  4.3  4.4  3.9 CL  104  107  105 CO2  30  29  28 BUN  8  6  7 CREA  0.89  0.72  0.83 GLU  110*  87  137* PHOS   --    --   4.4 MG   --    --   1.9 CA  8.5  8.0*  7.9* ALB  3.2*  3.0*  2.6* WBC  13.5*  16.1*  17.5* HGB  12.0  12.2  11.7 HCT  36.9  37.5  35.8 PLT  131*  117*  110* Recent Labs  
   188  18 
 0246 INR  1.0  1.2*  1.0 PTP  10.2  11.7*  9.9 Needs: urine analysis, urine sodium, protein and creatinine No results found for: RACHEL THAKKAR 
 
 
 
: Hanny Crum MD 
2018 [facility]: 
[email or web address] [facility] office: 
[address] [place] [address], [address] [place], [address] [facility] Phone: [phone] Fax :     [phone] Mobile office: 
[address] [facility] [place], [facility] [address] [facility] Phone - [phone] Fax - [phone]

## 2018-07-20 NOTE — PROGRESS NOTES
Neurology Progress Note    Interval Hx:      Follow up: Acute onset left leg weakness, severe lower back pain, right leg tingling/ paresthesias, urinary retention/incontinence, generalized hyperreflexia. Subjective:  Pt undergoing PLEX currently. She reports she feels she is not getting better. She also reports getting phenergan, dilaudid, and benadryl prior to my entering the room and within a few minutes she was sleeping comfortably. Dad is at the bedside. He reports that patient was having some urinary retention yesterday and lasix helped her. She was straight cathed x 1. We reviewed patients progress and diagnosis and that it is still questionable. We discussed completing PLEX on Sunday and re evaluating on Monday if rehab is appropriate or we need to transfer to [facility]. Lab review:  So far CSF has returned normal (WBC 4, Protein 38, Gram stain negative to date, HSV PCR negative), other than few hundred RBCs which is likely d/t traumatic tap. MS panel neg. CRP, CBC normal (other than mildly elevated Abs Lymphocytes 3.9). Other labs: ISABELLA, Copper, anti-ds DNA, urine heavy metals, lyme antibodies, SSA, SSB, were all negative.   NMO antibody was positive at >6        Current Facility-Administered Medications:     nystatin (MYCOSTATIN) 100,000 unit/mL oral suspension 500,000 Units, 500,000 Units, Oral, QID, Javad Graham MD, 500,000 Units at 07/20/18 0912    pregabalin (LYRICA) capsule 200 mg, 200 mg, Oral, TID, Alexa Stallings MD, 200 mg at 07/20/18 0912    HYDROmorphone (PF) (DILAUDID) injection 1 mg, 1 mg, IntraVENous, S6O PRN, Elicia Coburn MD, 1 mg at 07/20/18 0654    HYDROmorphone (DILAUDID) tablet 2 mg, 2 mg, Oral, E6M PRN, Elicia Coburn MD, 2 mg at 07/20/18 0912    calcium gluconate 1 g in 0.9% sodium chloride 100 mL IVPB, 1 g, IntraVENous, PRN, Stacey Ni MD    albumin human 5% (BUMINATE) solution 100 g, 2,000 mL, IntraVENous, EVERY OTHER DAY, Stacey Ni MD, 100 g at 07/20/18 0930    calcium gluconate 2 g in 0.9% sodium chloride 100 mL IVPB, 2 g, IntraVENous, EVERY OTHER DAY, Will Ann MD, Last Rate: 120 mL/hr at 07/20/18 0929, 2 g at 07/20/18 0929    0.9% sodium chloride infusion 2,000 mL, 2,000 mL, IntraVENous, EVERY OTHER DAY, Will Ann MD, 2,000 mL at 07/18/18 1912    anticoagulant citrate dextrose (ACD-A) solution, 1,000 mL, In Vitro, EVERY OTHER DAY, Will Ann MD, 1,000 mL at 07/18/18 1900    lidocaine (XYLOCAINE) 10 mg/mL (1 %) injection 20 mL, 20 mL, SubCUTAneous, Multiple, Raiza Malagon MD    promethazine (PHENERGAN) 12.5 mg in 0.9% sodium chloride 50 mL IVPB, 12.5 mg, IntraVENous, Q4H PRN, Nickolas BEAULIEU Do, MD, Last Rate: 200 mL/hr at 07/20/18 0929, 12.5 mg at 07/20/18 0929    diphenhydrAMINE (BENADRYL) capsule 50 mg, 50 mg, Oral, Q6H PRN, Cathy Ocampo MD, 50 mg at 07/20/18 0929    sorbitol 70 % solution 30 mL, 30 mL, Oral, DAILY PRN, Jayjay Bradley MD, 30 mL at 07/16/18 1007    zolpidem (AMBIEN) tablet 5 mg, 5 mg, Oral, QHS PRN, Cathy Ocampo MD, 5 mg at 07/19/18 2313    senna-docusate (PERICOLACE) 8.6-50 mg per tablet 1 Tab, 1 Tab, Oral, BID, Cathy Ocampo MD, 1 Tab at 07/20/18 0912    zolpidem (AMBIEN) tablet 5 mg, 5 mg, Oral, QHS, Cathy Ocampo MD, 5 mg at 07/19/18 2209    FLUoxetine (PROzac) capsule 20 mg, 20 mg, Oral, QHS, Cathy Ocampo MD, 20 mg at 07/19/18 2208    sodium chloride (NS) flush 5-10 mL, 5-10 mL, IntraVENous, Q8H, Maurice Díaz MD, 10 mL at 07/20/18 0305    sodium chloride (NS) flush 5-10 mL, 5-10 mL, IntraVENous, PRN, Maurice Díaz MD, 10 mL at 07/20/18 4947    acetaminophen (TYLENOL) tablet 650 mg, 650 mg, Oral, Q4H PRN, Maurice Díaz MD, 650 mg at 07/10/18 0658    enoxaparin (LOVENOX) injection 40 mg, 40 mg, SubCUTAneous, Q24H, Maurice Díaz MD, 40 mg at 07/19/18 1706    polyethylene glycol (MIRALAX) packet 17 g, 17 g, Oral, BID, Maurice Díaz MD, 17 g at 07/20/18 0912    Physical Exam    Patient Vitals for the past 12 hrs:   Temp Pulse Resp BP SpO2   07/20/18 1033 98.5 °F (36.9 °C) (!) 111 18 128/53 -   07/20/18 1012 98.4 °F (36.9 °C) (!) 112 18 106/54 -   07/20/18 0956 98.5 °F (36.9 °C) 100 18 128/60 -   07/20/18 0906 98.3 °F (36.8 °C) (!) 115 18 149/83 92 %   07/20/18 0703 - 99 - - -   07/20/18 0246 98.3 °F (36.8 °C) (!) 109 18 131/86 97 %   07/19/18 2328 98.7 °F (37.1 °C) (!) 111 18 140/84 96 %   07/19/18 2300 - (!) 111 - - -       Awake, resting in bed    Focused Neurological Exam   Extraocular movement intact bilaterally  Face appears symmetric  Hearing / Language intact to casual conversation    Sensory:   Left lower ext: absent pinprick/temp to level of T12  Right lower ext: intact LT, prick dec to mid thigh    Motor:   5/5 strength both arms  4/5 right leg  Left leg with at least 4/5 strength distally, 4/5 proximally in hip  Gait: not tested      Impression/ Plan    34 y.o. female with   Acute onset left leg weakness, severe lower back pain, right leg tingling/ paresthesias, urinary retention, generalized hyperreflexia    Repeat MRIs Brain, Cervical, Thoracic, Lumbar spine  (all with contrast) have not shown etiology (specifically no cord or CNS lesions, no spinal stenosis, no disc herniation). This was with a 1.5T scanner and patient may have better eval with 3T machine. LP with minimal RBCs (not suggestive of SAH) and no evidence of infection or inflammation (normal WBCs, Protein, Glucose, Gram stain, HSV PCR). MS Panel was negative. However NMO was positive. 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 Pt has completed IV solumedrol so will now consult nephrology and initiate PLEX with a plan for 5 exchanges. She is getting 4 of 5 today. Cont Lyrica to 200mg TID. Pt is max dose with this. Cont with palliative for pain management.  Pt requiring IV pain meds at this point and reports 10/10 back pain. She is concerned about transferring to another hospital where they may think she is drug seeking. Bowels improving. Will monitor    Will cont to monitor heart rate with remote telemetry as treatment is initiated. Leg swelling: no improvement despite good response to lasix    Urinary retention: patient required straight cath x 1, IV lasix helped her have good output on her own. Will do an additional dose today. Reviewed the diagnoses and treatment plans with patient and dad in detail at the bedside    Will follow closely with you. Signed By: Dudley Hanson MD     7-19-18      Over 35 minutes was spent reviewing records, discussing with Dr. Julian Castellanos and nursing, obtaining history, examining patient and discussing treatment plans.

## 2018-07-20 NOTE — PROGRESS NOTES
Problem: Self Care Deficits Care Plan (Adult)  Goal: *Acute Goals and Plan of Care (Insert Text)  Occupational Therapy Goals  7/18/2018- weekly re assessment  1. Patient will perform ADLs sitting EOB 5 mins without reports of pain supervision/setup within 7 day(s). 2.  Patient will perform lower body ADLs with minimal assistance/contact guard assist within 7 day(s). 3.  Patient will perform bathing with minimal assistance/contact guard assist within 7 day(s). 4.  Patient will perform toilet transfers with minimal assistance/contact guard assist and least restrictive device within 7 day(s). 5.  Patient will perform all aspects of toileting with supervision/set-up within 7 day(s). 6.  Patient will participate in upper extremity therapeutic exercise/activities to increase independence with ADLs with independence for 5 minutes within 7 day(s). 7.  Patient will verbalize/demonstrate 3/3 back precautions during ADL tasks without cues within 7 days. Initiated 7/10/2018  1. Patient will perform ADLs sitting EOB 5 mins without reports of pain supervision/setup within 7 day(s). progressing  2. Patient will perform lower body ADLs with minimal assistance/contact guard assist within 7 day(s). - progressing  3. Patient will perform bathing with minimal assistance/contact guard assist within 7 day(s). - progressing  4. Patient will perform toilet transfers with minimal assistance/contact guard assist x2 and least restrictive device within 7 day(s). - met  5. Patient will perform all aspects of toileting with supervision/set-up within 7 day(s). - progressing  6. Patient will participate in upper extremity therapeutic exercise/activities to increase independence with ADLs with independence for 5 minutes within 7 day(s). - progressing  7. Patient will verbalize/demonstrate 3/3 back precautions during ADL tasks without cues within 7 days.  - progressing        Occupational Therapy TREATMENT  Patient: Nikole Alvarado (32 y.o. 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 ASSESSMENT:  Pt with improved tolerance today for UB exercises. She sat edge of bed and performed each exercise according to handout issued to her yesterday 2 times. Heart rate increased from 132 to 148 after exercises. Pt fatigued following this and left resting seated edge of bed. Her Dad present for treatment. Recommend rehab. Progression toward goals:  []       Improving appropriately and progressing toward goals  [x]       Improving slowly and progressing toward goals  []       Not making progress toward goals and plan of care will be adjusted     PLAN:  Patient continues to benefit from skilled intervention to address the above impairments. Continue treatment per established plan of care. Discharge Recommendations:  Rehab  Further Equipment Recommendations for Discharge:  None     SUBJECTIVE:   Patient stated Now I am exhausted.     OBJECTIVE DATA SUMMARY:   Cognitive/Behavioral Status:  Neurologic State: Alert  Orientation Level: Oriented X4  Cognition: Appropriate safety awareness             Functional Mobility and Transfers for ADLs:  Bed Mobility:  Rolling: Modified independent  Supine to Sit: Modified independent (log roll technique)  Sit to Supine: Modified independent    Transfers:  Sit to Stand: Stand-by assistance          Balance:  Sitting: Intact; With support  Standing: Intact; With support  Standing - Static: Good  Standing - Dynamic : Good       Therapeutic Exercises:     EXERCISE   Sets   Reps   Active Active Assist   Passive   Comments   Shoulder flex/ext 2 10 [x]           []           []              Shoulder ab/add 2 10 [x] []           []              Chest presses 2 10 [x]           []           []              Elbow flex/ext 2 10 [x]           []           []              Triceps extension 2 10 [x]           []           []                 []           []           []                 []           []           []                 []           []           []                 []           []           []                 []           []           []                 []           []           []                  Pain:  Pain Scale 1: Numeric (0 - 10)  Pain Intensity 1: 10  Pain Location 1: Back  Pain Orientation 1: Lower  Pain Description 1: Aching; Throbbing  Pain Intervention(s) 1: Medication (see MAR); Distraction; Emotional support; Rest  Activity Tolerance:   Fair  Please refer to the flowsheet for vital signs taken during this treatment.   After treatment:   [] Patient left in no apparent distress sitting up in chair  [x] Patient left in no apparent distress in bed  [x] Call bell left within reach  [] Nursing notified  [x] Caregiver present  [] Bed alarm activated    COMMUNICATION/COLLABORATION:   The patients plan of care was discussed with: Occupational Therapist    KENNEDY Cabral  Time Calculation: 14 mins

## 2018-07-20 NOTE — ROUTINE PROCESS
Bedside shift change report given to Lizeth (oncoming nurse) by Cathie Khan (offgoing nurse). Report included the following information SBAR, Kardex, MAR and Recent Results.

## 2018-07-20 NOTE — ROUTINE PROCESS
Bedside shift change report given KULWANT Quinteros (oncoming nurse) by Tanya Randolph (offgoing nurse).  Report included the following information SBAR, Kardex, MAR, Recent Results and Cardiac Rhythm NSR-ST.

## 2018-07-20 NOTE — PROGRESS NOTES
Gabino Higuera [facility] [address]  [address] [facility], [address] [place] [address]  [phone]      Medical Progress Note      NAME: Shavonne Brunner   :  1990  MRM:  [id number]    Date/Time: 2018  9:43 AM       Assessment and Plan:   1. LT leg weakness and numbness/ lower back pain. 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 Initially treated with solumedrol and [id number] dose of plasmapheresis( total of 5 doses every other day. Last dose on ). MRI repeated with contrast still showed no lesions. Continue PT/OT. Symptomatic management. Discussed with neurology. Palliative care managing pain. May need rehab on discharged.        2. Thrush. On Nystatin swish and swallow     3.  Seizures ([address] (): no recent SZ. Not on AED. Follow up with neurology     4. Nonintractable migraine: post-LP HA resolved after blood patch.     5.  Urinary retention: intermittent straight cath as needed.       6. Thrombocytopenia / leukocytosis: may be related to plasmapheresis. Monitor CBC.       7.  Obesity (BMI 30-39. 9): would benefit from weight loss and dietary / lifestyle modifications     8. Constipation: cont bowel regimen. KUB unremarkable. Had BMs      9. LE edema: mild. No DVT on dopplers            Subjective:     Chief Complaint:  Follow up of pt who was admitted with LT leg weakness and back pain. Still c/o back pain and weakness of LT leg    ROS:  (bold if positive, if negative)      Tolerating PT  Tolerating Diet        Objective:     Last 24hrs VS reviewed since prior progress note.  Most recent are:    Visit Vitals    /86 (BP 1 Location: Right arm, BP Patient Position: At rest)    Pulse 99    Temp 98.3 °F (36.8 °C)    Resp 18    Ht 5' 6\" (1.676 m)    Wt 113.8 kg (250 lb 14.1 oz)    SpO2 97%    Breastfeeding No    BMI 40.49 kg/m2     SpO2 Readings from Last 6 Encounters:   18 97%   18 99%   18 96%   10/30/17 97%   10/21/16 98%   10/21/16 96% Intake/Output Summary (Last 24 hours) at 07/20/18 0900  Last data filed at 07/20/18 0250   Gross per 24 hour   Intake                0 ml   Output             2350 ml   Net            -2350 ml        Physical Exam:    Gen:  Well-developed, well-nourished, in no acute distress  HEENT:  Pink conjunctivae, PERRL, hearing intact to voice, moist mucous membranes  Neck:  Supple, without masses, thyroid non-tender  Resp:  No accessory muscle use, clear breath sounds without wheezes rales or rhonchi  Card:  No murmurs, normal S1, S2 without thrills, bruits or peripheral edema  Abd:  Soft, non-tender, non-distended, normoactive bowel sounds are present, no palpable organomegaly and no detectable hernias  Lymph:  No cervical or inguinal adenopathy  Musc:  No cyanosis or clubbing  Skin:  No rashes or ulcers, skin turgor is good  Neuro:  Cranial nerves are grossly intact, LT leg weakness   Psych:  Good insight, oriented to person, place and time, alert  __________________________________________________________________  Medications Reviewed: (see below)  Medications:     Current Facility-Administered Medications   Medication Dose Route Frequency    nystatin (MYCOSTATIN) 100,000 unit/mL oral suspension 500,000 Units  500,000 Units Oral QID    pregabalin (LYRICA) capsule 200 mg  200 mg Oral TID    HYDROmorphone (PF) (DILAUDID) injection 1 mg  1 mg IntraVENous Q4H PRN    HYDROmorphone (DILAUDID) tablet 2 mg  2 mg Oral Q4H PRN    calcium gluconate 1 g in 0.9% sodium chloride 100 mL IVPB  1 g IntraVENous PRN    albumin human 5% (BUMINATE) solution 100 g  2,000 mL IntraVENous EVERY OTHER DAY    calcium gluconate 2 g in 0.9% sodium chloride 100 mL IVPB  2 g IntraVENous EVERY OTHER DAY    0.9% sodium chloride infusion 2,000 mL  2,000 mL IntraVENous EVERY OTHER DAY    anticoagulant citrate dextrose (ACD-A) solution  1,000 mL In Vitro EVERY OTHER DAY    lidocaine (XYLOCAINE) 10 mg/mL (1 %) injection 20 mL  20 mL SubCUTAneous Multiple    promethazine (PHENERGAN) 12.5 mg in 0.9% sodium chloride 50 mL IVPB  12.5 mg IntraVENous Q4H PRN    diphenhydrAMINE (BENADRYL) capsule 50 mg  50 mg Oral Q6H PRN    sorbitol 70 % solution 30 mL  30 mL Oral DAILY PRN    zolpidem (AMBIEN) tablet 5 mg  5 mg Oral QHS PRN    senna-docusate (PERICOLACE) 8.6-50 mg per tablet 1 Tab  1 Tab Oral BID    zolpidem (AMBIEN) tablet 5 mg  5 mg Oral QHS    FLUoxetine (PROzac) capsule 20 mg  20 mg Oral QHS    sodium chloride (NS) flush 5-10 mL  5-10 mL IntraVENous Q8H    sodium chloride (NS) flush 5-10 mL  5-10 mL IntraVENous PRN    acetaminophen (TYLENOL) tablet 650 mg  650 mg Oral Q4H PRN    enoxaparin (LOVENOX) injection 40 mg  40 mg SubCUTAneous Q24H    polyethylene glycol (MIRALAX) packet 17 g  17 g Oral BID        Lab Data Reviewed: (see below)  Lab Review:     Recent Labs      07/20/18   0521  07/19/18   0328  07/18/18   0246   WBC  13.5*  16.1*  17.5*   HGB  12.0  12.2  11.7   HCT  36.9  37.5  35.8   PLT  131*  117*  110*     Recent Labs      07/20/18   0521  07/19/18   0328  07/18/18   0246   NA  139  139  140   K  4.3  4.4  3.9   CL  104  107  105   CO2  30  29  28   GLU  110*  87  137*   BUN  8  6  7   CREA  0.89  0.72  0.83   CA  8.5  8.0*  7.9*   MG   --    --   1.9   PHOS   --    --   4.4   ALB  3.2*  3.0*  2.6*   TBILI  0.5  1.0  0.5   SGOT  128*  52*  31   ALT  240*  78  69   INR  1.0  1.2*  1.0     Lab Results   Component Value Date/Time    Glucose (POC) 96 06/20/2012 01:55 AM    Glucose (POC) 93 05/17/2012 04:56 PM     No results for input(s): PH, PCO2, PO2, HCO3, FIO2 in the last 72 hours.   Recent Labs      07/20/18   0521  07/19/18   0328  07/18/18   0246   INR  1.0  1.2*  1.0     All Micro Results     Procedure Component Value Units Date/Time    CULTURE, CSF  TUBE 2 [[id number]] Collected:  07/09/18 1636    Order Status:  Completed Specimen:  Cerebrospinal Fluid Updated:  07/16/18 0949     Special Requests:         Culture performed on Unspun Fluid     GRAM STAIN NO ORGANISMS SEEN         FEW WBCS SEEN                 Smear Reviewed by Microbiology     Culture result:         Culture performed on Unspun Fluid              NO GROWTH ON SOLID MEDIA AT 4 DAYS              NO GROWTH IN THIO BROTH AT 7 DAYS    CULTURE, URINE [[id number]] Collected:  07/08/18 1500    Order Status:  Completed Specimen:  Urine from [place] Specimen Updated:  07/09/18 2030     Special Requests: NO SPECIAL REQUESTS        [place] Count <1,000 CFU/ML        Culture result: NO GROWTH 1 DAY       HSV BY PCR [[id number]] Collected:  07/09/18 1637    Order Status:  Canceled Specimen:  Other Updated:  07/09/18 5895          I have reviewed notes of prior 24hr. Other pertinent lab:       Total time spent with patient: [address] discussed with: Patient, Family, Nursing Staff, Consultant/Specialist and >50% of time spent in counseling and coordination of care    Discussed:  Care Plan    Prophylaxis:  Lovenox    Disposition:  [facility]/Rehab           ___________________________________________________    Attending Physician: Marj Portillo MD

## 2018-07-20 NOTE — PROGRESS NOTES
Palliative Medicine Consult    Patient Name: Vianney Brizuela  YOB: 1990    Date of Initial Consult: 7/10/18  Reason for Consult: Overwhelming symptoms  Requesting Provider: Dr. Edison Blanchard  Primary Care Physician: Casper Elmore NP      SUMMARY:   Vianney Brizuela is a 34 y.o. with a past history of seizure d/o(not on meds), who was admitted on 7/8/2018 from home with a diagnosis of numbness in LE and back pain. Current medical issues leading to Palliative Medicine involvement include: overwhelming symptoms    Chart reviewed/HPI-patient with prior h/o of lower extremity weakness/numbness abput 7 years ago. She was admitted to [facility] but cause never known and she was discharged after 1 week in the hospital. During that time, she was receiving high doses of IV dilaudid to the point where her mom apparently weaned her off once she returned home. Patient was returning from the beach last Thursday and as she was getting into her car, her left leg \"gave out\" and had associated back pain. Thought maybe related to sleeping on different bed at the beach but over the weekend, symptoms progressed to include left leg numbness to right leg numbness and then numbness from the waist distally. In addition, she describes severe back pain. Workup to include MRI brain, cervical, thoracic and lumbar spine, LP, neurology evaluation have not been conclusive. There is concern about transverse myelitis and patient been started on high dose IV solumedrol. In addition, she has been placed on gabapentin 600 mg(dose increased earlier today by Neurology). Since being in the hospital, she has used ultram, zanaflex, percocet and toradol and states none have provided her any relief of her back pain. Patient with other issues to include urinary incontinence and constipation issues.       reviewed  Only 2 scripts in the last year->one for valium on 4/24/18 and 1 for percocet 5/325 on 9/7/17    SH-lives with her boyfriend, Denies any illicit drug use. No significant alcohol intake. Works at [employer] with [employer]. Mom is a nurse with [employer] [employer] [facility]:   1. Acute back pain, unclear etiology-both neuropathic and somatic pain  2. 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 Numbness in lower extremities, unclear cause-workup in progress  4. Debility         PLAN:   1. Met with patient and father. Patient feels like she is not improving at all->feels like she has regressed. Completed plasma exchange today. 2. Pain management-remains a complicated situation since her positive antibody but other studies(MRI of the spine does not show any abnormalities consistent with neuromyelitis. Discussed situation with Dr. Nany Muniz again today and pain management remains complicated and difficult to fully assess her needs. Will continue dilaudid 1 mg IV every 4 hours prn severe pain(explained that 1 mg IV dilaudid=4 mg po dilaudid so this is double her prior oral dosing) and continue po dilaudid 2 mg po every 4 hours prn moderate pain. At this point, she has used IV dilaudid x 7 doses yakov last 24 hours and po dilaudid 2 mg x 5 doses. I remain concerned about her increase use of both the IV and po opioids. Discussed with Dr. Bhupendra Milan and will continue thru the weekend, but if she is discharged to rehab, the equivalent dose of dilaudid 1 mg IV=4 mg po and would transition to this at the time of discharge for limited amount of time(2 weeks max). .  Will continue to monitor dosing closely. Will continue her lyrica 200 mg tid   3. Discussed with Dr. Bhupendra Milan and Dr. Nany Muniz  4. Constipation-seems improved overall. Continue current bowel regiment  5. Discussed with bedside nurse  6. Initial consult note routed to primary continuity provider  7.  Communicated plan of care with: Palliative IDT       GOALS OF CARE / TREATMENT PREFERENCES:   [====Goals of Care====]  GOALS OF CARE:  Patient/Health Care Proxy Stated Goals: Rehabilitation      TREATMENT PREFERENCES:   Code Status: Full Code    Advance Care Planning:  Advance Care Planning 7/8/2018   Patient's Healthcare Decision Maker is: Legal Next of Kin   Confirm Advance Directive None       Medical Interventions: Full interventions  Other Instructions: The palliative care team has discussed with patient / health care proxy about goals of care / treatment preferences for patient.  [====Goals of Care====]         HISTORY:     History obtained from: chart, patient, father    CHIEF COMPLAINT: back pain    HPI/SUBJECTIVE:    The patient is:   [x] Verbal and participatory  [] Non-participatory due to:   Patient is alert and oriented, appears comfortable, but rates pain as high as 10    Clinical Pain Assessment (nonverbal scale for severity on nonverbal patients):   Clinical Pain Assessment  Severity: 10  Location: lower back  Character: throbbing  Duration: days  Effect: difficult to ambulate  Factors: movement  Frequency: constant            FUNCTIONAL ASSESSMENT:     Palliative Performance Scale (PPS):  PPS: 50       PSYCHOSOCIAL/SPIRITUAL SCREENING:     Advance Care Planning:  Advance Care Planning 7/8/2018   Patient's Healthcare Decision Maker is: Legal Next of Kin   Confirm Advance Directive None        Any spiritual / Gnosticist concerns:  [] Yes /  [x] No    Caregiver Burnout:  [] Yes /  [x] No /  [] No Caregiver Present      Anticipatory grief assessment:   [x] Normal  / [] Maladaptive       ESAS Anxiety: Anxiety: 1    ESAS Depression: Depression: 0        REVIEW OF SYSTEMS:     Positive and pertinent negative findings in ROS are noted above in HPI. The following systems were [x] reviewed / [] unable to be reviewed as noted in HPI  Other findings are noted below.   Systems: constitutional, ears/nose/mouth/throat, respiratory, gastrointestinal, genitourinary, musculoskeletal, integumentary, neurologic, psychiatric, endocrine. Positive findings noted below. Modified ESAS Completed by: provider   Fatigue: 1 Drowsiness: 0   Depression: 0 Pain: 10   Anxiety: 1 Nausea: 0   Anorexia: 0 Dyspnea: 0     Constipation: Yes     Stool Occurrence(s): 1        PHYSICAL EXAM:     From RN flowsheet:  Wt Readings from Last 3 Encounters:   07/19/18 250 lb 14.1 oz (113.8 kg)   04/27/18 251 lb (113.9 kg)   04/27/18 246 lb 14.6 oz (112 kg)     Blood pressure 150/74, pulse (!) 135, temperature 98.3 °F (36.8 °C), resp. rate 17, height 5' 6\" (1.676 m), weight 250 lb 14.1 oz (113.8 kg), SpO2 (!) 88 %, not currently breastfeeding. Pain Scale 1: Numeric (0 - 10)  Pain Intensity 1: 10  Pain Onset 1: ongoing  Pain Location 1: Back  Pain Orientation 1: Lower  Pain Description 1: Aching, Throbbing  Pain Intervention(s) 1: Medication (see MAR), Distraction, Emotional support, Rest  Last bowel movement, if known:     General-NAD, calm  Lungs-CTA  CV-RRR  Neuro-numbness remains in the left leg with associated weakness but better overall,  mild TTP lower back area-unchanged     HISTORY:     Principal Problem:    Neuromyelitis optica ([facility] [address] (7/14/2018)    Active Problems:    Left leg weakness (7/8/2018)      Numbness in left leg (7/8/2018)      Seizures (HCC) ()      Nonintractable migraine (7/8/2018)      Urinary incontinence (7/9/2018)      Bradycardia, sinus (7/9/2018)      Obesity (BMI 30-39.9) (7/9/2018)      Neuromyelitis optica spectrum disorder ([address] (7/14/2018)      Past Medical History:   Diagnosis Date    Headache     Headache(784.0)     Neuromyelitis optica ([address] 7/14/2018    Obesity (BMI 30-39. 9) 7/9/2018    Seizures (HCC)       Past Surgical History:   Procedure Laterality Date    ENDOSCOPY, COLON, DIAGNOSTIC      HX APPENDECTOMY      HX CHOLECYSTECTOMY      HX GI      HX GYN        Family History   Problem Relation Age of Onset    Hypertension Father     Diabetes Maternal Grandmother     Coronary Artery Disease Maternal Grandfather     Diabetes Maternal Grandfather     Diabetes Paternal Grandfather       History reviewed, no pertinent family history. Social History   Substance Use Topics    Smoking status: Current Every Day Smoker    Smokeless tobacco: Never Used    Alcohol use Yes     Allergies   Allergen Reactions    Codeine Other (comments)     Tachycardia    Sulfa (Sulfonamide Antibiotics) Itching    Bactrim [Sulfamethoprim Ds] Itching    Chocolate Flavor Other (comments)     Pt reports migraines    Clindamycin Other (comments)     Chest tightness    Compazine [Prochlorperazine Edisylate] Other (comments)     Neck and jaw spasm  And difficultly swallow     Morphine Hives    Peanut Other (comments)     migraines    Reglan [Metoclopramide] Nausea and Vomiting and Vertigo     Sleep disorder and shaking    Sulfur Swelling     Throat closes.     Zofran [Ondansetron Hcl (Pf)] Hives    Metaxalone Palpitations      Current Facility-Administered Medications   Medication Dose Route Frequency    nystatin (MYCOSTATIN) 100,000 unit/mL oral suspension 500,000 Units  500,000 Units Oral QID    pregabalin (LYRICA) capsule 200 mg  200 mg Oral TID    HYDROmorphone (PF) (DILAUDID) injection 1 mg  1 mg IntraVENous Q4H PRN    HYDROmorphone (DILAUDID) tablet 2 mg  2 mg Oral Q4H PRN    calcium gluconate 1 g in 0.9% sodium chloride 100 mL IVPB  1 g IntraVENous PRN    albumin human 5% (BUMINATE) solution 100 g  2,000 mL IntraVENous EVERY OTHER DAY    calcium gluconate 2 g in 0.9% sodium chloride 100 mL IVPB  2 g IntraVENous EVERY OTHER DAY    0.9% sodium chloride infusion 2,000 mL  2,000 mL IntraVENous EVERY OTHER DAY    anticoagulant citrate dextrose (ACD-A) solution  1,000 mL In Vitro EVERY OTHER DAY    lidocaine (XYLOCAINE) 10 mg/mL (1 %) injection 20 mL  20 mL SubCUTAneous Multiple    promethazine (PHENERGAN) 12.5 mg in 0.9% sodium chloride 50 mL IVPB  12.5 mg IntraVENous Q4H PRN    diphenhydrAMINE (BENADRYL) capsule 50 mg  50 mg Oral Q6H PRN    sorbitol 70 % solution 30 mL  30 mL Oral DAILY PRN    zolpidem (AMBIEN) tablet 5 mg  5 mg Oral QHS PRN    senna-docusate (PERICOLACE) 8.6-50 mg per tablet 1 Tab  1 Tab Oral BID    zolpidem (AMBIEN) tablet 5 mg  5 mg Oral QHS    FLUoxetine (PROzac) capsule 20 mg  20 mg Oral QHS    sodium chloride (NS) flush 5-10 mL  5-10 mL IntraVENous Q8H    sodium chloride (NS) flush 5-10 mL  5-10 mL IntraVENous PRN    acetaminophen (TYLENOL) tablet 650 mg  650 mg Oral Q4H PRN    enoxaparin (LOVENOX) injection 40 mg  40 mg SubCUTAneous Q24H    polyethylene glycol (MIRALAX) packet 17 g  17 g Oral BID          LAB AND IMAGING FINDINGS:     Lab Results   Component Value Date/Time    WBC 13.5 (H) 07/20/2018 05:21 AM    HGB 12.0 07/20/2018 05:21 AM    PLATELET 318 (L) 10/20/7527 05:21 AM     Lab Results   Component Value Date/Time    Sodium 139 07/20/2018 05:21 AM    Potassium 4.3 07/20/2018 05:21 AM    Chloride 104 07/20/2018 05:21 AM    CO2 30 07/20/2018 05:21 AM    BUN 8 07/20/2018 05:21 AM    Creatinine 0.89 07/20/2018 05:21 AM    Calcium 8.5 07/20/2018 05:21 AM    Magnesium 1.9 07/18/2018 02:46 AM    Phosphorus 4.4 07/18/2018 02:46 AM      Lab Results   Component Value Date/Time    AST (SGOT) 128 (H) 07/20/2018 05:21 AM    Alk.  phosphatase 43 (L) 07/20/2018 05:21 AM    Protein, total 5.0 (L) 07/20/2018 05:21 AM    Albumin 3.2 (L) 07/20/2018 05:21 AM    Globulin 1.8 (L) 07/20/2018 05:21 AM     Lab Results   Component Value Date/Time    INR 1.0 07/20/2018 05:21 AM    Prothrombin time 10.2 07/20/2018 05:21 AM      No results found for: IRON, FE, TIBC, IBCT, PSAT, FERR   No results found for: PH, PCO2, PO2  No components found for: GLPOC   No results found for: CPK, CKMB             Total time:   Counseling / coordination time, spent as noted above:   > 50% counseling / coordination?:     Prolonged service was provided for  []30 min   []75 min in face to face time in the presence of the patient, spent as noted above. Time Start:   Time End:   Note: this can only be billed with 85920 (initial) or [id number] (follow up). If multiple start / stop times, list each separately.

## 2018-07-21 LAB
ANION GAP SERPL CALC-SCNC: 6 MMOL/L (ref 5–15)
BASOPHILS # BLD: 0.1 K/UL (ref 0–0.1)
BASOPHILS NFR BLD: 0 % (ref 0–1)
BUN SERPL-MCNC: 6 MG/DL (ref 6–20)
BUN/CREAT SERPL: 8 (ref 12–20)
CALCIUM SERPL-MCNC: 8 MG/DL (ref 8.5–10.1)
CHLORIDE SERPL-SCNC: 105 MMOL/L (ref 97–108)
CO2 SERPL-SCNC: 24 MMOL/L (ref 21–32)
CREAT SERPL-MCNC: 0.76 MG/DL (ref 0.55–1.02)
DIFFERENTIAL METHOD BLD: ABNORMAL
EOSINOPHIL # BLD: 0.2 K/UL (ref 0–0.4)
EOSINOPHIL NFR BLD: 1 % (ref 0–7)
ERYTHROCYTE [DISTWIDTH] IN BLOOD BY AUTOMATED COUNT: 13.2 % (ref 11.5–14.5)
FIBRINOGEN PPP-MCNC: 167 MG/DL (ref 200–475)
GLUCOSE SERPL-MCNC: 114 MG/DL (ref 65–100)
HCT VFR BLD AUTO: 39.3 % (ref 35–47)
HGB BLD-MCNC: 12.6 G/DL (ref 11.5–16)
IMM GRANULOCYTES # BLD: 0.3 K/UL (ref 0–0.04)
IMM GRANULOCYTES NFR BLD AUTO: 2 % (ref 0–0.5)
INR PPP: 1.1 (ref 0.9–1.1)
LYMPHOCYTES # BLD: 3.3 K/UL (ref 0.8–3.5)
LYMPHOCYTES NFR BLD: 20 % (ref 12–49)
MAGNESIUM SERPL-MCNC: 1.9 MG/DL (ref 1.6–2.4)
MCH RBC QN AUTO: 31.7 PG (ref 26–34)
MCHC RBC AUTO-ENTMCNC: 32.1 G/DL (ref 30–36.5)
MCV RBC AUTO: 98.7 FL (ref 80–99)
MONOCYTES # BLD: 0.9 K/UL (ref 0–1)
MONOCYTES NFR BLD: 5 % (ref 5–13)
NEUTS SEG # BLD: 12 K/UL (ref 1.8–8)
NEUTS SEG NFR BLD: 72 % (ref 32–75)
NRBC # BLD: 0 K/UL (ref 0–0.01)
NRBC BLD-RTO: 0 PER 100 WBC
PHOSPHATE SERPL-MCNC: 3.7 MG/DL (ref 2.6–4.7)
PLATELET # BLD AUTO: 111 K/UL (ref 150–400)
PMV BLD AUTO: 10 FL (ref 8.9–12.9)
POTASSIUM SERPL-SCNC: 4.2 MMOL/L (ref 3.5–5.1)
PROTHROMBIN TIME: 11 SEC (ref 9–11.1)
RBC # BLD AUTO: 3.98 M/UL (ref 3.8–5.2)
SODIUM SERPL-SCNC: 135 MMOL/L (ref 136–145)
WBC # BLD AUTO: 16.8 K/UL (ref 3.6–11)

## 2018-07-21 PROCEDURE — 74011250636 HC RX REV CODE- 250/636: Performed by: FAMILY MEDICINE

## 2018-07-21 PROCEDURE — 74011000258 HC RX REV CODE- 258: Performed by: INTERNAL MEDICINE

## 2018-07-21 PROCEDURE — 74011250637 HC RX REV CODE- 250/637: Performed by: PSYCHIATRY & NEUROLOGY

## 2018-07-21 PROCEDURE — 93005 ELECTROCARDIOGRAM TRACING: CPT

## 2018-07-21 PROCEDURE — 74011250637 HC RX REV CODE- 250/637: Performed by: INTERNAL MEDICINE

## 2018-07-21 PROCEDURE — 85384 FIBRINOGEN ACTIVITY: CPT | Performed by: INTERNAL MEDICINE

## 2018-07-21 PROCEDURE — 83735 ASSAY OF MAGNESIUM: CPT | Performed by: INTERNAL MEDICINE

## 2018-07-21 PROCEDURE — 77030038269 HC DRN EXT URIN PURWCK BARD -A

## 2018-07-21 PROCEDURE — 65660000000 HC RM CCU STEPDOWN

## 2018-07-21 PROCEDURE — 51798 US URINE CAPACITY MEASURE: CPT

## 2018-07-21 PROCEDURE — 74011250637 HC RX REV CODE- 250/637: Performed by: FAMILY MEDICINE

## 2018-07-21 PROCEDURE — 74011250636 HC RX REV CODE- 250/636: Performed by: INTERNAL MEDICINE

## 2018-07-21 PROCEDURE — 85610 PROTHROMBIN TIME: CPT | Performed by: INTERNAL MEDICINE

## 2018-07-21 PROCEDURE — 85025 COMPLETE CBC W/AUTO DIFF WBC: CPT | Performed by: INTERNAL MEDICINE

## 2018-07-21 PROCEDURE — 84100 ASSAY OF PHOSPHORUS: CPT | Performed by: INTERNAL MEDICINE

## 2018-07-21 PROCEDURE — 80048 BASIC METABOLIC PNL TOTAL CA: CPT | Performed by: INTERNAL MEDICINE

## 2018-07-21 PROCEDURE — 36415 COLL VENOUS BLD VENIPUNCTURE: CPT | Performed by: INTERNAL MEDICINE

## 2018-07-21 PROCEDURE — 77030011943

## 2018-07-21 RX ADMIN — SENNOSIDES AND DOCUSATE SODIUM 1 TABLET: 8.6; 5 TABLET ORAL at 08:09

## 2018-07-21 RX ADMIN — NYSTATIN 500000 UNITS: 100000 SUSPENSION ORAL at 12:32

## 2018-07-21 RX ADMIN — HYDROMORPHONE HYDROCHLORIDE 2 MG: 2 TABLET ORAL at 06:07

## 2018-07-21 RX ADMIN — PREGABALIN 200 MG: 100 CAPSULE ORAL at 22:27

## 2018-07-21 RX ADMIN — HYDROMORPHONE HYDROCHLORIDE 2 MG: 2 TABLET ORAL at 14:46

## 2018-07-21 RX ADMIN — Medication 10 ML: at 22:19

## 2018-07-21 RX ADMIN — HYDROMORPHONE HYDROCHLORIDE 1 MG: 2 INJECTION, SOLUTION INTRAMUSCULAR; INTRAVENOUS; SUBCUTANEOUS at 00:09

## 2018-07-21 RX ADMIN — HYDROMORPHONE HYDROCHLORIDE 2 MG: 2 TABLET ORAL at 18:17

## 2018-07-21 RX ADMIN — ENOXAPARIN SODIUM 40 MG: 40 INJECTION SUBCUTANEOUS at 18:17

## 2018-07-21 RX ADMIN — Medication 10 ML: at 04:00

## 2018-07-21 RX ADMIN — POLYETHYLENE GLYCOL (3350) 17 G: 17 POWDER, FOR SOLUTION ORAL at 18:00

## 2018-07-21 RX ADMIN — HYDROMORPHONE HYDROCHLORIDE 1 MG: 2 INJECTION, SOLUTION INTRAMUSCULAR; INTRAVENOUS; SUBCUTANEOUS at 16:36

## 2018-07-21 RX ADMIN — HYDROMORPHONE HYDROCHLORIDE 1 MG: 2 INJECTION, SOLUTION INTRAMUSCULAR; INTRAVENOUS; SUBCUTANEOUS at 20:29

## 2018-07-21 RX ADMIN — SENNOSIDES AND DOCUSATE SODIUM 1 TABLET: 8.6; 5 TABLET ORAL at 18:17

## 2018-07-21 RX ADMIN — HYDROMORPHONE HYDROCHLORIDE 1 MG: 2 INJECTION, SOLUTION INTRAMUSCULAR; INTRAVENOUS; SUBCUTANEOUS at 08:09

## 2018-07-21 RX ADMIN — POLYETHYLENE GLYCOL (3350) 17 G: 17 POWDER, FOR SOLUTION ORAL at 08:09

## 2018-07-21 RX ADMIN — ZOLPIDEM TARTRATE 5 MG: 5 TABLET ORAL at 22:18

## 2018-07-21 RX ADMIN — HYDROMORPHONE HYDROCHLORIDE 1 MG: 2 INJECTION, SOLUTION INTRAMUSCULAR; INTRAVENOUS; SUBCUTANEOUS at 12:32

## 2018-07-21 RX ADMIN — PREGABALIN 200 MG: 100 CAPSULE ORAL at 08:09

## 2018-07-21 RX ADMIN — HYDROMORPHONE HYDROCHLORIDE 2 MG: 2 TABLET ORAL at 02:13

## 2018-07-21 RX ADMIN — FLUOXETINE 20 MG: 20 CAPSULE ORAL at 22:18

## 2018-07-21 RX ADMIN — HYDROMORPHONE HYDROCHLORIDE 2 MG: 2 TABLET ORAL at 10:16

## 2018-07-21 RX ADMIN — HYDROMORPHONE HYDROCHLORIDE 1 MG: 2 INJECTION, SOLUTION INTRAMUSCULAR; INTRAVENOUS; SUBCUTANEOUS at 04:00

## 2018-07-21 RX ADMIN — PROMETHAZINE HYDROCHLORIDE 12.5 MG: 25 INJECTION INTRAMUSCULAR; INTRAVENOUS at 18:00

## 2018-07-21 RX ADMIN — ZOLPIDEM TARTRATE 5 MG: 5 TABLET ORAL at 00:09

## 2018-07-21 RX ADMIN — NYSTATIN 500000 UNITS: 100000 SUSPENSION ORAL at 18:17

## 2018-07-21 RX ADMIN — PREGABALIN 200 MG: 100 CAPSULE ORAL at 16:37

## 2018-07-21 RX ADMIN — Medication 10 ML: at 16:38

## 2018-07-21 RX ADMIN — HYDROMORPHONE HYDROCHLORIDE 2 MG: 2 TABLET ORAL at 22:18

## 2018-07-21 RX ADMIN — NYSTATIN 500000 UNITS: 100000 SUSPENSION ORAL at 08:12

## 2018-07-21 RX ADMIN — Medication 10 ML: at 00:09

## 2018-07-21 RX ADMIN — NYSTATIN 500000 UNITS: 100000 SUSPENSION ORAL at 22:18

## 2018-07-21 NOTE — PROGRESS NOTES
[address]   [place] [address]   [facility]    [facility]    Fax         LE weakness without definitive dx    Discussed with Dr. William Arce  Chart reviewed    Positive NMO AB but negative MRI of entire axis x 2  Completed solumedrol x5 d  PLEX x 5 days, last yesterday  Increasing issues with c/o pain and increasing use of IV narcotics for c/o low back pain  Some question of supratentorial etiology but has had urinary retention and pathologic DTRs   tx initiated for question NMO but again no MRI evidence of such  Discussions of rehab vs tfer to [facility] Monday  Case mgt notes perceived difficulty with Rehab/insurance  Palliative has seen and concerns re her pain med consumption noted  No nursing events noted overnight    She reports she has been using a purewick and has been incontinent  She says she had to be straight cathed this am  She also notes with therapy she is able to walk with a walker down the benz 80 feet but \"they force me to do so\"  She reports significant pain and endorses pain in extrems when examined, however she is quite comfortable appearing and she does not display physiologic cues c/w her complaints    Examination  Visit Vitals    /88 (BP 1 Location: Right arm, BP Patient Position: At rest)    Pulse (!) 115    Temp 98.2 °F (36.8 °C)    Resp 18    Ht 5' 6\" (1.676 m)    Wt 119.6 kg (263 lb 10.7 oz)    SpO2 97%    Breastfeeding No    BMI 42.56 kg/m2     She is awake, alert and well appearing  Up watching tv and using her cell phone  Quite comfortable appearing  She has her own bedsheets on the bed  Lower extremities with 4+-5/5 at psoas bilaterally and distally she is 5/5 although she will not give full effort at left AT due to c/o pain  DTR are brisk throughout  Toes are mute    Imp/plan  D/w patient at length  The fact she is able to ambulate with a walker --has that ability, the best course now that she is s/p both solumedrol and PLEX is to go to rehab  These treatments are not immediate even in NMO and it would take time  I do not think there is much else to do from an acute perspective and while I do agree she needs to be seen at [facility] by Dr. Kevin Blanton and associates re this question of NMO, this can be done as an outpatient  She immediately began to express how she couldn't possibly go to rehab secondary to her pain and despite discussion regarding above and how another acute admission would not add anything, she became increasingly agitated and noted she would discuss with her parents  D/w Dr. Enid Benson and we will attempt [facility] placement Monday and OP follow up with Gabriel Jarquin  It would also be a good idea to have a Psych consult to evaluate for somatization and other underlying psychopathology likely contributing but I am told when this was rec in the past, there was a large admin uproar with pt complaints/pt advocate etc-shameful Ron Bloch, MD

## 2018-07-21 NOTE — PROGRESS NOTES
Bedside shift change report given to [facility] (oncoming nurse) by Kacy Cuevas (offgoing nurse). Report included the following information SBAR, Kardex, Intake/Output, MAR, Recent Results and Cardiac Rhythm Sinus Tach.

## 2018-07-21 NOTE — PROGRESS NOTES
PHYSICAL THERAPY NOTE:   Treatment attempted with patient deferring. Pt reports that she has had a rough morning having discussions with several doctors this date. Will continue to follow. Pt will be put on the Sunday schedule for possible treatment otherwise daily treatment to resume on Monday. Thank you.   [facility]

## 2018-07-21 NOTE — ROUTINE PROCESS
Bedside and Verbal shift change report given to Carlos Hanna RN (oncoming nurse) by Mica Reed RN (offgoing nurse).  Report included the following information SBAR, Intake/Output, MAR, Recent Results and Cardiac Rhythm ST.

## 2018-07-21 NOTE — PROGRESS NOTES
[address]  [address] [facility], [address] [place] [address]  [phone]      Medical Progress Note      NAME: Sapna Aguila   :  1990  MRM:  [id number]    Date/Time: 2018  9:43 AM       Assessment and Plan:   1. LT leg weakness and numbness/ lower back pain. 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 Initially treated with solumedrol and [id number] dose of plasmapheresis( total of 5 doses every other day. Last dose on ). MRI repeated with contrast still showed no lesions. Continue PT/OT. Symptomatic management. Discussed with neurology, Dr Janie Denny. Palliative care managing pain. May need rehab on discharged.        2. Thrush. On Nystatin swish and swallow     3.  Seizures ([address] (): no recent SZ. Not on AED. Follow up with neurology     4. Nonintractable migraine: post-LP HA resolved after blood patch.     5.  Urinary retention: intermittent straight cath as needed.       6. Thrombocytopenia / leukocytosis: may be related to plasmapheresis. Monitor CBC.       7.  Obesity (BMI 30-39. 9): would benefit from weight loss and dietary / lifestyle modifications     8. Constipation: cont bowel regimen. KUB unremarkable. Had BMs      9. LE edema: mild. No DVT on dopplers            Subjective:     Chief Complaint:  Follow up of pt who was admitted with LT leg weakness and back pain. Pt is upset, crying on the topic of mentioning rehab. Still c/o back pain and weakness of LT leg    ROS:  (bold if positive, if negative)      Tolerating PT  Tolerating Diet        Objective:     Last 24hrs VS reviewed since prior progress note.  Most recent are:    Visit Vitals    /88 (BP 1 Location: Right arm, BP Patient Position: At rest)    Pulse (!) 115    Temp 98.2 °F (36.8 °C)    Resp 18    Ht 5' 6\" (1.676 m)    Wt 119.6 kg (263 lb 10.7 oz)    SpO2 97%    Breastfeeding No    BMI 42.56 kg/m2     SpO2 Readings from Last 6 Encounters:   18 97%   18 99%   04/27/18 96%   10/30/17 97%   10/21/16 98%   10/21/16 96%            Intake/Output Summary (Last 24 hours) at 07/21/18 8603  Last data filed at 07/21/18 0415   Gross per 24 hour   Intake                0 ml   Output             2050 ml   Net            -2050 ml        Physical Exam:    Gen:  Well-developed, well-nourished, in no acute distress  HEENT:  Pink conjunctivae, PERRL, hearing intact to voice, moist mucous membranes  Neck:  Supple, without masses, thyroid non-tender  Resp:  No accessory muscle use, clear breath sounds without wheezes rales or rhonchi  Card:  No murmurs, normal S1, S2 without thrills, bruits or peripheral edema  Abd:  Soft, non-tender, non-distended, normoactive bowel sounds are present, no palpable organomegaly and no detectable hernias  Lymph:  No cervical or inguinal adenopathy  Musc:  No cyanosis or clubbing  Skin:  No rashes or ulcers, skin turgor is good  Neuro:  Cranial nerves are grossly intact, LT leg weakness   Psych:  Good insight, oriented to person, place and time, alert  __________________________________________________________________  Medications Reviewed: (see below)  Medications:     Current Facility-Administered Medications   Medication Dose Route Frequency    nystatin (MYCOSTATIN) 100,000 unit/mL oral suspension 500,000 Units  500,000 Units Oral QID    pregabalin (LYRICA) capsule 200 mg  200 mg Oral TID    HYDROmorphone (PF) (DILAUDID) injection 1 mg  1 mg IntraVENous Q4H PRN    HYDROmorphone (DILAUDID) tablet 2 mg  2 mg Oral Q4H PRN    calcium gluconate 1 g in 0.9% sodium chloride 100 mL IVPB  1 g IntraVENous PRN    albumin human 5% (BUMINATE) solution 100 g  2,000 mL IntraVENous EVERY OTHER DAY    calcium gluconate 2 g in 0.9% sodium chloride 100 mL IVPB  2 g IntraVENous EVERY OTHER DAY    0.9% sodium chloride infusion 2,000 mL  2,000 mL IntraVENous EVERY OTHER DAY    anticoagulant citrate dextrose (ACD-A) solution  1,000 mL In Vitro EVERY OTHER DAY    lidocaine (XYLOCAINE) 10 mg/mL (1 %) injection 20 mL  20 mL SubCUTAneous Multiple    promethazine (PHENERGAN) 12.5 mg in 0.9% sodium chloride 50 mL IVPB  12.5 mg IntraVENous Q4H PRN    diphenhydrAMINE (BENADRYL) capsule 50 mg  50 mg Oral Q6H PRN    sorbitol 70 % solution 30 mL  30 mL Oral DAILY PRN    zolpidem (AMBIEN) tablet 5 mg  5 mg Oral QHS PRN    senna-docusate (PERICOLACE) 8.6-50 mg per tablet 1 Tab  1 Tab Oral BID    zolpidem (AMBIEN) tablet 5 mg  5 mg Oral QHS    FLUoxetine (PROzac) capsule 20 mg  20 mg Oral QHS    sodium chloride (NS) flush 5-10 mL  5-10 mL IntraVENous Q8H    sodium chloride (NS) flush 5-10 mL  5-10 mL IntraVENous PRN    acetaminophen (TYLENOL) tablet 650 mg  650 mg Oral Q4H PRN    enoxaparin (LOVENOX) injection 40 mg  40 mg SubCUTAneous Q24H    polyethylene glycol (MIRALAX) packet 17 g  17 g Oral BID        Lab Data Reviewed: (see below)  Lab Review:     Recent Labs      07/21/18   0308  07/20/18   0521  07/19/18   0328   WBC  16.8*  13.5*  16.1*   HGB  12.6  12.0  12.2   HCT  39.3  36.9  37.5   PLT  111*  131*  117*     Recent Labs      07/21/18   0308  07/20/18   0521  07/19/18   0328   NA  135*  139  139   K  4.2  4.3  4.4   CL  105  104  107   CO2  24  30  29   GLU  114*  110*  87   BUN  6  8  6   CREA  0.76  0.89  0.72   CA  8.0*  8.5  8.0*   MG  1.9   --    --    PHOS  3.7   --    --    ALB   --   3.2*  3.0*   TBILI   --   0.5  1.0   SGOT   --   128*  52*   ALT   --   240*  78   INR  1.1  1.0  1.2*     Lab Results   Component Value Date/Time    Glucose (POC) 96 06/20/2012 01:55 AM    Glucose (POC) 93 05/17/2012 04:56 PM     No results for input(s): PH, PCO2, PO2, HCO3, FIO2 in the last 72 hours.   Recent Labs      07/21/18   0308  07/20/18   0521  07/19/18   0328   INR  1.1  1.0  1.2*     All Micro Results     Procedure Component Value Units Date/Time    CULTURE, CSF  TUBE 2 [[id number]] Collected:  07/09/18 1636    Order Status:  Completed Specimen:  Cerebrospinal Fluid Updated:  07/16/18 0949     Special Requests:         Culture performed on Unspun Fluid     GRAM STAIN NO ORGANISMS SEEN         FEW WBCS SEEN                 Smear Reviewed by Microbiology     Culture result:         Culture performed on Unspun Fluid              NO GROWTH ON SOLID MEDIA AT 4 DAYS              NO GROWTH IN THIO BROTH AT 7 DAYS    CULTURE, URINE [[id number]] Collected:  07/08/18 1500    Order Status:  Completed Specimen:  Urine from [place] Specimen Updated:  07/09/18 2030     Special Requests: NO SPECIAL REQUESTS        [place] Count <1,000 CFU/ML        Culture result: NO GROWTH 1 DAY       HSV BY PCR [[id number]] Collected:  07/09/18 1637    Order Status:  Canceled Specimen:  Other Updated:  07/09/18 4784          I have reviewed notes of prior 24hr. Other pertinent lab:       Total time spent with patient: [address] discussed with: Patient, Family, Nursing Staff, Consultant/Specialist and >50% of time spent in counseling and coordination of care    Discussed:  Care Plan    Prophylaxis:  Lovenox    Disposition:  [facility]/Rehab           ___________________________________________________    Attending Physician: Kasandra Correa MD

## 2018-07-22 LAB
ALBUMIN SERPL-MCNC: 2.8 G/DL (ref 3.5–5)
ALBUMIN/GLOB SERPL: 1.9 {RATIO} (ref 1.1–2.2)
ALP SERPL-CCNC: 55 U/L (ref 45–117)
ALT SERPL-CCNC: 146 U/L (ref 12–78)
ANION GAP SERPL CALC-SCNC: 5 MMOL/L (ref 5–15)
AST SERPL-CCNC: 63 U/L (ref 15–37)
BASOPHILS # BLD: 0 K/UL (ref 0–0.1)
BASOPHILS NFR BLD: 0 % (ref 0–1)
BILIRUB SERPL-MCNC: 0.5 MG/DL (ref 0.2–1)
BUN SERPL-MCNC: 4 MG/DL (ref 6–20)
BUN/CREAT SERPL: 6 (ref 12–20)
CALCIUM SERPL-MCNC: 8.2 MG/DL (ref 8.5–10.1)
CHLORIDE SERPL-SCNC: 106 MMOL/L (ref 97–108)
CO2 SERPL-SCNC: 28 MMOL/L (ref 21–32)
CREAT SERPL-MCNC: 0.71 MG/DL (ref 0.55–1.02)
DIFFERENTIAL METHOD BLD: ABNORMAL
EOSINOPHIL # BLD: 0.2 K/UL (ref 0–0.4)
EOSINOPHIL NFR BLD: 2 % (ref 0–7)
ERYTHROCYTE [DISTWIDTH] IN BLOOD BY AUTOMATED COUNT: 13.5 % (ref 11.5–14.5)
FIBRINOGEN PPP-MCNC: 325 MG/DL (ref 200–475)
GLOBULIN SER CALC-MCNC: 1.5 G/DL (ref 2–4)
GLUCOSE SERPL-MCNC: 90 MG/DL (ref 65–100)
HCT VFR BLD AUTO: 35 % (ref 35–47)
HGB BLD-MCNC: 11.3 G/DL (ref 11.5–16)
IMM GRANULOCYTES # BLD: 0.2 K/UL (ref 0–0.04)
IMM GRANULOCYTES NFR BLD AUTO: 1 % (ref 0–0.5)
INR PPP: 1 (ref 0.9–1.1)
LYMPHOCYTES # BLD: 2.6 K/UL (ref 0.8–3.5)
LYMPHOCYTES NFR BLD: 23 % (ref 12–49)
MCH RBC QN AUTO: 31.7 PG (ref 26–34)
MCHC RBC AUTO-ENTMCNC: 32.3 G/DL (ref 30–36.5)
MCV RBC AUTO: 98.3 FL (ref 80–99)
MONOCYTES # BLD: 0.6 K/UL (ref 0–1)
MONOCYTES NFR BLD: 5 % (ref 5–13)
NEUTS SEG # BLD: 8.1 K/UL (ref 1.8–8)
NEUTS SEG NFR BLD: 69 % (ref 32–75)
NRBC # BLD: 0 K/UL (ref 0–0.01)
NRBC BLD-RTO: 0 PER 100 WBC
PLATELET # BLD AUTO: 103 K/UL (ref 150–400)
PMV BLD AUTO: 10.5 FL (ref 8.9–12.9)
POTASSIUM SERPL-SCNC: 4.3 MMOL/L (ref 3.5–5.1)
PROT SERPL-MCNC: 4.3 G/DL (ref 6.4–8.2)
PROTHROMBIN TIME: 9.6 SEC (ref 9–11.1)
RBC # BLD AUTO: 3.56 M/UL (ref 3.8–5.2)
SODIUM SERPL-SCNC: 139 MMOL/L (ref 136–145)
WBC # BLD AUTO: 11.7 K/UL (ref 3.6–11)

## 2018-07-22 PROCEDURE — 74011250637 HC RX REV CODE- 250/637: Performed by: INTERNAL MEDICINE

## 2018-07-22 PROCEDURE — 65660000000 HC RM CCU STEPDOWN

## 2018-07-22 PROCEDURE — 74011250636 HC RX REV CODE- 250/636: Performed by: INTERNAL MEDICINE

## 2018-07-22 PROCEDURE — 85610 PROTHROMBIN TIME: CPT | Performed by: INTERNAL MEDICINE

## 2018-07-22 PROCEDURE — 80053 COMPREHEN METABOLIC PANEL: CPT | Performed by: INTERNAL MEDICINE

## 2018-07-22 PROCEDURE — 74011000258 HC RX REV CODE- 258: Performed by: INTERNAL MEDICINE

## 2018-07-22 PROCEDURE — 97110 THERAPEUTIC EXERCISES: CPT

## 2018-07-22 PROCEDURE — 36514 APHERESIS PLASMA: CPT

## 2018-07-22 PROCEDURE — 74011250636 HC RX REV CODE- 250/636: Performed by: FAMILY MEDICINE

## 2018-07-22 PROCEDURE — P9045 ALBUMIN (HUMAN), 5%, 250 ML: HCPCS | Performed by: INTERNAL MEDICINE

## 2018-07-22 PROCEDURE — 74011250637 HC RX REV CODE- 250/637: Performed by: FAMILY MEDICINE

## 2018-07-22 PROCEDURE — 85025 COMPLETE CBC W/AUTO DIFF WBC: CPT | Performed by: INTERNAL MEDICINE

## 2018-07-22 PROCEDURE — 36415 COLL VENOUS BLD VENIPUNCTURE: CPT | Performed by: INTERNAL MEDICINE

## 2018-07-22 PROCEDURE — 85384 FIBRINOGEN ACTIVITY: CPT | Performed by: INTERNAL MEDICINE

## 2018-07-22 PROCEDURE — 97116 GAIT TRAINING THERAPY: CPT

## 2018-07-22 PROCEDURE — 51798 US URINE CAPACITY MEASURE: CPT

## 2018-07-22 PROCEDURE — 74011000250 HC RX REV CODE- 250: Performed by: INTERNAL MEDICINE

## 2018-07-22 PROCEDURE — 74011250637 HC RX REV CODE- 250/637: Performed by: PSYCHIATRY & NEUROLOGY

## 2018-07-22 RX ADMIN — HYDROMORPHONE HYDROCHLORIDE 2 MG: 2 TABLET ORAL at 02:19

## 2018-07-22 RX ADMIN — HYDROMORPHONE HYDROCHLORIDE 1 MG: 2 INJECTION, SOLUTION INTRAMUSCULAR; INTRAVENOUS; SUBCUTANEOUS at 04:34

## 2018-07-22 RX ADMIN — PREGABALIN 200 MG: 100 CAPSULE ORAL at 15:38

## 2018-07-22 RX ADMIN — DIPHENHYDRAMINE HYDROCHLORIDE 50 MG: 25 CAPSULE ORAL at 08:31

## 2018-07-22 RX ADMIN — ZOLPIDEM TARTRATE 5 MG: 5 TABLET ORAL at 00:33

## 2018-07-22 RX ADMIN — ANTICOAGULANT CITRATE DEXTROSE SOLUTION FORMULA A 1000 ML: 12.25; 11; 3.65 SOLUTION INTRAVENOUS at 11:28

## 2018-07-22 RX ADMIN — ZOLPIDEM TARTRATE 5 MG: 5 TABLET ORAL at 23:49

## 2018-07-22 RX ADMIN — FLUOXETINE 20 MG: 20 CAPSULE ORAL at 22:00

## 2018-07-22 RX ADMIN — POLYETHYLENE GLYCOL (3350) 17 G: 17 POWDER, FOR SOLUTION ORAL at 17:56

## 2018-07-22 RX ADMIN — Medication 10 ML: at 14:00

## 2018-07-22 RX ADMIN — NYSTATIN 500000 UNITS: 100000 SUSPENSION ORAL at 17:56

## 2018-07-22 RX ADMIN — POLYETHYLENE GLYCOL (3350) 17 G: 17 POWDER, FOR SOLUTION ORAL at 08:31

## 2018-07-22 RX ADMIN — HYDROMORPHONE HYDROCHLORIDE 1 MG: 2 INJECTION, SOLUTION INTRAMUSCULAR; INTRAVENOUS; SUBCUTANEOUS at 13:15

## 2018-07-22 RX ADMIN — HYDROMORPHONE HYDROCHLORIDE 1 MG: 2 INJECTION, SOLUTION INTRAMUSCULAR; INTRAVENOUS; SUBCUTANEOUS at 22:00

## 2018-07-22 RX ADMIN — PREGABALIN 200 MG: 100 CAPSULE ORAL at 08:48

## 2018-07-22 RX ADMIN — SENNOSIDES AND DOCUSATE SODIUM 1 TABLET: 8.6; 5 TABLET ORAL at 17:57

## 2018-07-22 RX ADMIN — HYDROMORPHONE HYDROCHLORIDE 1 MG: 2 INJECTION, SOLUTION INTRAMUSCULAR; INTRAVENOUS; SUBCUTANEOUS at 00:34

## 2018-07-22 RX ADMIN — Medication 10 ML: at 06:00

## 2018-07-22 RX ADMIN — CALCIUM GLUCONATE 2 G: 94 INJECTION, SOLUTION INTRAVENOUS at 09:29

## 2018-07-22 RX ADMIN — PROMETHAZINE HYDROCHLORIDE 12.5 MG: 25 INJECTION INTRAMUSCULAR; INTRAVENOUS at 09:29

## 2018-07-22 RX ADMIN — HYDROMORPHONE HYDROCHLORIDE 2 MG: 2 TABLET ORAL at 06:47

## 2018-07-22 RX ADMIN — NYSTATIN 500000 UNITS: 100000 SUSPENSION ORAL at 13:14

## 2018-07-22 RX ADMIN — HYDROMORPHONE HYDROCHLORIDE 2 MG: 2 TABLET ORAL at 23:49

## 2018-07-22 RX ADMIN — Medication 6 ML: at 22:00

## 2018-07-22 RX ADMIN — ZOLPIDEM TARTRATE 5 MG: 5 TABLET ORAL at 22:00

## 2018-07-22 RX ADMIN — PREGABALIN 200 MG: 100 CAPSULE ORAL at 23:50

## 2018-07-22 RX ADMIN — SENNOSIDES AND DOCUSATE SODIUM 1 TABLET: 8.6; 5 TABLET ORAL at 08:31

## 2018-07-22 RX ADMIN — NYSTATIN 500000 UNITS: 100000 SUSPENSION ORAL at 22:00

## 2018-07-22 RX ADMIN — HYDROMORPHONE HYDROCHLORIDE 1 MG: 2 INJECTION, SOLUTION INTRAMUSCULAR; INTRAVENOUS; SUBCUTANEOUS at 08:31

## 2018-07-22 RX ADMIN — NYSTATIN 500000 UNITS: 100000 SUSPENSION ORAL at 08:31

## 2018-07-22 RX ADMIN — ALBUMIN (HUMAN) 100 G: 12.5 INJECTION, SOLUTION INTRAVENOUS at 09:29

## 2018-07-22 RX ADMIN — HYDROMORPHONE HYDROCHLORIDE 2 MG: 2 TABLET ORAL at 19:50

## 2018-07-22 RX ADMIN — PREGABALIN 200 MG: 100 CAPSULE ORAL at 22:00

## 2018-07-22 RX ADMIN — HYDROMORPHONE HYDROCHLORIDE 2 MG: 2 TABLET ORAL at 10:52

## 2018-07-22 RX ADMIN — HYDROMORPHONE HYDROCHLORIDE 2 MG: 2 TABLET ORAL at 15:38

## 2018-07-22 RX ADMIN — ENOXAPARIN SODIUM 40 MG: 40 INJECTION SUBCUTANEOUS at 17:56

## 2018-07-22 RX ADMIN — HYDROMORPHONE HYDROCHLORIDE 1 MG: 2 INJECTION, SOLUTION INTRAMUSCULAR; INTRAVENOUS; SUBCUTANEOUS at 17:56

## 2018-07-22 NOTE — PROGRESS NOTES
Supportive follow-up visit for pt. Pt's father was present. They shared that they were tired of being in the hospital for so long but were trying to stay strong so they can figure out what is going on with the pt medically. They mentioned that there was a meeting coming up tomorrow and they were hopeful of it. They appreciate prayer and  support.  affirmed their strength and assured them of prayers. Reminded them of  availability and of continued support as needed/ desired. Shayy Freire.[email or web address].   Spiritual Care Department  If needs rise please call Keyla-RAMON ([phone])

## 2018-07-22 NOTE — PROCEDURES
[facility] Acutes   [phone]   Vitals Pre Post Assessment Pre Post   /67 134/63 LOC A&OX4 A&OX4    104 Lungs diminished diminished   Temp 98.4 98.4 Cardiac tachycardia tachycardia   Resp 18 18 Skin intact intact   Weight 119.6 kg  Edema 1+PAYTON 1+PAYTON   Height 5'6\"  Pain 9 9     Plasmapheresis Orders   Product:  Albumin, Normal Saline                                                 Volume:        4000 ml                                               Duration: Start: 0954 End: 1057 Total: 62     Fluids    Volume Processed: 8369   Plasma Removed: 4237   Replacement Fluid: Albumin 5% 2000 ml, NS 2000 ml   Total Citrate: 710   NS: 0   Fluid Balance: 100 %     Access   Type & Location: [facility]. Ports of Justin/permcath disinfected with Alcohol per policy. Each lumen aspirated for blood return and flushed with Normal Saline per policy. Dialysis initiated. Central line catheter flushed with normal saline per policy. Ports disinfected with Alcohol per policy and lines disconnected and capped using aseptic technique. See LDA docflowsheet for dressing information. Comments:                                 Meds Given   Name Dose Route   Calcium gluconate 2 grams ivpb/0929   Phenergan 12.5 mg ivpb/0929   Albumin 5% 100 grams Iv/0929          Labs   Obtained/Reviewed  Critical Results Called   yes     Comments      Patient tolerated tx well, no complaints during or after. Report to unit nurse, Rajiv Walters RN, using SBAR.

## 2018-07-22 NOTE — PROGRESS NOTES
[address]  [address] [facility], [address] [place] [address]  [phone]      Medical Progress Note      NAME: Shavonne Brunner   :  1990  MRM:  [id number]    Date/Time: 2018  9:43 AM       Assessment and Plan:   1. LT leg weakness and numbness/ lower back pain. 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 Initially treated with solumedrol and [id number] dose of plasmapheresis( total of 5 doses every other day. Last dose on ). MRI repeated with contrast still showed no lesions. Continue PT/OT. Symptomatic management. Discussed with neurology, Dr Nino Dias. Palliative care managing pain. Pt always c/o severe pain, but most of the time she is solving puzzles or on her phone and looks comfortable. 2.  Thrush. On Nystatin swish and swallow     3.  Seizures ([address] (): no recent SZ. Not on AED. Follow up with neurology     4. Nonintractable migraine: post-LP HA resolved after blood patch.     5.  Urinary retention: intermittent straight cath as needed.       6. Thrombocytopenia / leukocytosis: may be related to plasmapheresis. Monitor CBC.       7.  Obesity (BMI 30-39. 9): would benefit from weight loss and dietary / lifestyle modifications     8. Constipation: cont bowel regimen. KUB unremarkable. Had BMs      9. LE edema: mild. No DVT on dopplers    10. Transaminitis. Improving. Monitor. 11.  Sinus tachycardia. Likely from stress and pain. Recent TSH is normal.     Disposition: spoke with pt regarding transfer to [facility] for higher level of care, but pt became upset about transferring her stating that \" you want to get rid of me\". I explained to her that if she has no improvement and her symptoms are getting worse, from our neurologists stand point, there is no further diagnostic workup or treatment option( see not above). Pt also refusing rehab.    Spoke with pt's father at another time and he requested to have MRI at [facility] with T3 scanner before transferring. I don't see why pt doesn't want to be transferred to higher level of care( [facility]) as they likely do have similar machine and be get higher level of care at the same time. Pt multiple time called patient advocates and administration. Subjective:     Chief Complaint:  Follow up of pt who was admitted with LT leg weakness and back pain. Pt is upset, crying on the topic of mentioning rehab. Still c/o back pain and weakness of LT leg    ROS:  (bold if positive, if negative)      Tolerating PT  Tolerating Diet        Objective:     Last 24hrs VS reviewed since prior progress note.  Most recent are:    Visit Vitals    /82 (BP 1 Location: Right arm, BP Patient Position: At rest;Supine)    Pulse (!) 102    Temp 97.9 °F (36.6 °C)    Resp 17    Ht 5' 6\" (1.676 m)    Wt 119.6 kg (263 lb 10.7 oz)    SpO2 95%    Breastfeeding No    BMI 42.56 kg/m2     SpO2 Readings from Last 6 Encounters:   07/22/18 95%   04/27/18 99%   04/27/18 96%   10/30/17 97%   10/21/16 98%   10/21/16 96%          No intake or output data in the 24 hours ending 07/22/18 0700     Physical Exam:    Gen:  Well-developed, well-nourished, in no acute distress  HEENT:  Pink conjunctivae, PERRL, hearing intact to voice, moist mucous membranes  Neck:  Supple, without masses, thyroid non-tender  Resp:  No accessory muscle use, clear breath sounds without wheezes rales or rhonchi  Card:  No murmurs, normal S1, S2 without thrills, bruits or peripheral edema  Abd:  Soft, non-tender, non-distended, normoactive bowel sounds are present, no palpable organomegaly and no detectable hernias  Lymph:  No cervical or inguinal adenopathy  Musc:  No cyanosis or clubbing  Skin:  No rashes or ulcers, skin turgor is good  Neuro:  Cranial nerves are grossly intact, LT leg weakness   Psych:  Good insight, oriented to person, place and time, alert  __________________________________________________________________  Medications Reviewed: (see below)  Medications:     Current Facility-Administered Medications   Medication Dose Route Frequency    nystatin (MYCOSTATIN) 100,000 unit/mL oral suspension 500,000 Units  500,000 Units Oral QID    pregabalin (LYRICA) capsule 200 mg  200 mg Oral TID    HYDROmorphone (PF) (DILAUDID) injection 1 mg  1 mg IntraVENous Q4H PRN    HYDROmorphone (DILAUDID) tablet 2 mg  2 mg Oral Q4H PRN    calcium gluconate 1 g in 0.9% sodium chloride 100 mL IVPB  1 g IntraVENous PRN    albumin human 5% (BUMINATE) solution 100 g  2,000 mL IntraVENous EVERY OTHER DAY    calcium gluconate 2 g in 0.9% sodium chloride 100 mL IVPB  2 g IntraVENous EVERY OTHER DAY    0.9% sodium chloride infusion 2,000 mL  2,000 mL IntraVENous EVERY OTHER DAY    anticoagulant citrate dextrose (ACD-A) solution  1,000 mL In Vitro EVERY OTHER DAY    lidocaine (XYLOCAINE) 10 mg/mL (1 %) injection 20 mL  20 mL SubCUTAneous Multiple    promethazine (PHENERGAN) 12.5 mg in 0.9% sodium chloride 50 mL IVPB  12.5 mg IntraVENous Q4H PRN    diphenhydrAMINE (BENADRYL) capsule 50 mg  50 mg Oral Q6H PRN    sorbitol 70 % solution 30 mL  30 mL Oral DAILY PRN    zolpidem (AMBIEN) tablet 5 mg  5 mg Oral QHS PRN    senna-docusate (PERICOLACE) 8.6-50 mg per tablet 1 Tab  1 Tab Oral BID    zolpidem (AMBIEN) tablet 5 mg  5 mg Oral QHS    FLUoxetine (PROzac) capsule 20 mg  20 mg Oral QHS    sodium chloride (NS) flush 5-10 mL  5-10 mL IntraVENous Q8H    sodium chloride (NS) flush 5-10 mL  5-10 mL IntraVENous PRN    acetaminophen (TYLENOL) tablet 650 mg  650 mg Oral Q4H PRN    enoxaparin (LOVENOX) injection 40 mg  40 mg SubCUTAneous Q24H    polyethylene glycol (MIRALAX) packet 17 g  17 g Oral BID        Lab Data Reviewed: (see below)  Lab Review:     Recent Labs      07/22/18   0228  07/21/18   0308  07/20/18   0521   WBC  11.7*  16.8*  13.5*   HGB  11.3*  12.6  12.0   HCT  35.0  39.3  36.9   PLT  103*  111*  131*     Recent Labs      07/22/18   0228  07/21/18 0308  07/20/18   0521   NA  139  135*  139   K  4.3  4.2  4.3   CL  106  105  104   CO2  28  24  30   GLU  90  114*  110*   BUN  4*  6  8   CREA  0.71  0.76  0.89   CA  8.2*  8.0*  8.5   MG   --   1.9   --    PHOS   --   3.7   --    ALB  2.8*   --   3.2*   TBILI  0.5   --   0.5   SGOT  63*   --   128*   ALT  146*   --   240*   INR  1.0  1.1  1.0     Lab Results   Component Value Date/Time    Glucose (POC) 96 06/20/2012 01:55 AM    Glucose (POC) 93 05/17/2012 04:56 PM     No results for input(s): PH, PCO2, PO2, HCO3, FIO2 in the last 72 hours. Recent Labs      07/22/18   0228  07/21/18   0308  07/20/18   0521   INR  1.0  1.1  1.0     All Micro Results     Procedure Component Value Units Date/Time    CULTURE, CSF  TUBE 2 [[id number]] Collected:  07/09/18 1636    Order Status:  Completed Specimen:  Cerebrospinal Fluid Updated:  07/16/18 0949     Special Requests:         Culture performed on Unspun Fluid     GRAM STAIN NO ORGANISMS SEEN         FEW WBCS SEEN                 Smear Reviewed by Microbiology     Culture result:         Culture performed on Unspun Fluid              NO GROWTH ON SOLID MEDIA AT 4 DAYS              NO GROWTH IN THIO BROTH AT 7 DAYS    CULTURE, URINE [[id number]] Collected:  07/08/18 1500    Order Status:  Completed Specimen:  Urine from [place] Specimen Updated:  07/09/18 2030     Special Requests: NO SPECIAL REQUESTS        [place] Count <1,000 CFU/ML        Culture result: NO GROWTH 1 DAY       HSV BY PCR [[id number]] Collected:  07/09/18 1637    Order Status:  Canceled Specimen:  Other Updated:  07/09/18 1758          I have reviewed notes of prior 24hr. Other pertinent lab:       Total time spent with patient: [address] discussed with: Patient, Family, Nursing Staff, Consultant/Specialist and >50% of time spent in counseling and coordination of care    Discussed:  Care Plan    Prophylaxis:  Lovenox    Disposition: [facility]/Rehab           ___________________________________________________    Attending Physician: Sunita William MD

## 2018-07-22 NOTE — PROGRESS NOTES
Evening chart review  Discussed with Dr Latricia Tian earlier  Pt cont to refuse rehab placement  Walked 70 ft with PT  Completed PLEX  Nothing more to accomplish from an acute hospitla stay  Needs to go to regab with OP follow up at [facility]    Will sign off

## 2018-07-22 NOTE — PROGRESS NOTES
Problem: Mobility Impaired (Adult and Pediatric)  Goal: *Acute Goals and Plan of Care (Insert Text)  Physical Therapy Goals  Initiated 7/10/2018 Re-evaluation 7/19/2018 - no goals achieved - all goals remain appropriate  1. Patient will move from supine to sit and sit to supine  in bed with modified independence within 7 day(s). 2.  Patient will transfer from bed to chair and chair to bed with modified independence using the least restrictive device within 7 day(s). 3.  Patient will perform sit to stand with supervision/set-up within 7 day(s). 4.  Patient will ambulate with minimal assistance/contact guard assist for 150 feet with the least restrictive device within 7 day(s). 5.  Patient will ascend/descend 4 stairs with 2 handrail(s) with minimal assistance/contact guard assist within 7 day(s). physical Therapy TREATMENT  Patient: Margie Douglas (59 y.o. female)  Date: 7/22/2018  Diagnosis: Left leg weakness  Left leg weakness  epidural blood patch  Neuromyelitis optica spectrum disorder (HCC) [facility]  Procedure(s) (LRB):  epidural blood patch (N/A) 11 Days Post-Op  Precautions: Back, Fall (L knee buckling)  Chart, physical therapy assessment, plan of care and goals were reviewed. ASSESSMENT:  Patient agrees to PT however stating she needs her pain medication. Nursing aware and arrived in the room to give medications. Reporting 9/10 pain in the back. Patient stating pain increases when extending the knee while in a seated position. Still c/o numbness and decreased sensation of the L LE and paraesthesia's of the R LE. Patient's father stating she's gone from being incontinent to retaining urine and having to be catheterized. Patient performs rolling to R with log roll technique and supine to sit SBA. Resting HR 126bpm.  Sit to stand CGA performed slowly. Patient able to ambulate 79' with RW with slow careful steps, looking down at feet CGA.   HR reached 126-138bpm during ambulation. Patient stating she is \"really really tired\" and self limiting further ambulation. Patient returned to bed and performed seated exercises per below. Pt demonstrating good technique with log roll during bed mobility, movement patterns performed slow and steady observing good safety practices. She is limited by pain and fatigue however is motivated to improve. Patient will benefit from rehab at discharge. Progression toward goals:  []    Improving appropriately and progressing toward goals  [x]    Improving slowly and progressing toward goals  []    Not making progress toward goals and plan of care will be adjusted     PLAN:  Patient continues to benefit from skilled intervention to address the above impairments. Continue treatment per established plan of care. Discharge Recommendations:  Rehab  Further Equipment Recommendations for Discharge:  TBD by rehab     SUBJECTIVE:   Patient stated Evelia Cheatham had my plasmapheresis this morning .     OBJECTIVE DATA SUMMARY:   Critical Behavior:  Neurologic State: Alert  Orientation Level: Oriented X4  Cognition: Appropriate decision making  Safety/Judgement: Awareness of environment  Functional Mobility Training:  Bed Mobility:  Rolling: Stand-by assistance  Supine to Sit: Stand-by assistance  Sit to Supine: Stand-by assistance           Transfers:  Sit to Stand: Contact guard assistance  Stand to Sit: Contact guard assistance                             Balance:  Sitting: Intact  Standing: Intact; With support  Standing - Static: Good  Ambulation/Gait Training:  Distance (ft): 70 Feet (ft)  Assistive Device: Gait belt;Walker, rolling  Ambulation - Level of Assistance: Contact guard assistance;Assist x1        Gait Abnormalities: Altered arm swing; Step to gait (looking down at feet)              Speed/Annetta: Slow                      Stairs:did not occur                Therapeutic Exercises:   Seated Long arc quads 1x5 each LE;  (modified AROM on L LE due to pain in back); hip ADD raphael with pillow hold 5\" x 5 reps  Pain:  Pain Scale 1: Numeric (0 - 10)  Pain Intensity 1: 9           Pain Intervention(s) 1: Medication (see MAR)  Activity Tolerance:   See above  Please refer to the flowsheet for vital signs taken during this treatment.   After treatment:   []    Patient left in no apparent distress sitting up in chair  [x]    Patient left in no apparent distress in bed  [x]    Call bell left within reach  []    Nursing notified  [x]    Caregiver present  []    Bed alarm activated    COMMUNICATION/COLLABORATION:   The patients plan of care was discussed with: Registered Nurse    Kingsley Georges DPT   Time Calculation: 23 mins

## 2018-07-22 NOTE — PROGRESS NOTES
Bedside shift change report given to KULWANT Lau (oncoming nurse) by Jose Roberto Espinoza (offgoing nurse). Report included the following information SBAR, Kardex, Intake/Output, MAR and Recent Results.

## 2018-07-22 NOTE — DISCHARGE SUMMARY
Physician Interim Summary   Patient ID:  Jesse Bradshaw  [id number]  89 y.o.  1990    PCP: Alyssa Hobbs NP     Consults: Nephrology, Neurology, Orthopedic Surgery and Palliative Care    Covering dates: 7/8/2018 through 7/22/2018    Admission Diagnoses: Left leg weakness  Left leg weakness  epidural blood patch  Neuromyelitis optica spectrum disorder [facility])    Hospital Course:    Ms. Bradley Chan is a 33 yo WF w/ hx of depression who was admitted with LLE numbness and weakness, urinary incontinence and back pain. Had extensive workup, but without any abnormality except +aquaforin 4 ab. Pt initially treated with high dose steroid IV without a response. Later, started on plasmapheresis and last dose is on 7/22/18. According to pt, she has no improvement at all, even thinks, it is worse. She works with PT and can walk with them about 80 feet. Initially, had urinary incontinent, but now retention and requires intermittent catheterization. Additional hospital course and discharge summary will be done by discharging physician.

## 2018-07-22 NOTE — PROGRESS NOTES
Bedside shift change report given to Gianfranco (oncoming nurse) by Marbella Shah (offgoing nurse). Report included the following information SBAR, Kardex, Intake/Output, MAR, Recent Results and Cardiac Rhythm Sinus Tach.

## 2018-07-23 LAB
ALBUMIN SERPL-MCNC: 2.6 G/DL (ref 3.5–5)
ALBUMIN/GLOB SERPL: 2.2 {RATIO} (ref 1.1–2.2)
ALP SERPL-CCNC: 39 U/L (ref 45–117)
ALT SERPL-CCNC: 62 U/L (ref 12–78)
ANION GAP SERPL CALC-SCNC: 7 MMOL/L (ref 5–15)
AST SERPL-CCNC: 28 U/L (ref 15–37)
BASOPHILS # BLD: 0 K/UL (ref 0–0.1)
BASOPHILS NFR BLD: 0 % (ref 0–1)
BILIRUB SERPL-MCNC: 0.5 MG/DL (ref 0.2–1)
BUN SERPL-MCNC: 3 MG/DL (ref 6–20)
BUN/CREAT SERPL: 4 (ref 12–20)
CALCIUM SERPL-MCNC: 7.8 MG/DL (ref 8.5–10.1)
CHLORIDE SERPL-SCNC: 108 MMOL/L (ref 97–108)
CO2 SERPL-SCNC: 25 MMOL/L (ref 21–32)
CREAT SERPL-MCNC: 0.77 MG/DL (ref 0.55–1.02)
DIFFERENTIAL METHOD BLD: ABNORMAL
EOSINOPHIL # BLD: 0.2 K/UL (ref 0–0.4)
EOSINOPHIL NFR BLD: 2 % (ref 0–7)
ERYTHROCYTE [DISTWIDTH] IN BLOOD BY AUTOMATED COUNT: 13.7 % (ref 11.5–14.5)
GLOBULIN SER CALC-MCNC: 1.2 G/DL (ref 2–4)
GLUCOSE SERPL-MCNC: 119 MG/DL (ref 65–100)
HCT VFR BLD AUTO: 34.2 % (ref 35–47)
HGB BLD-MCNC: 11.2 G/DL (ref 11.5–16)
IMM GRANULOCYTES # BLD: 0.2 K/UL (ref 0–0.04)
IMM GRANULOCYTES NFR BLD AUTO: 1 % (ref 0–0.5)
INR PPP: 1.1 (ref 0.9–1.1)
LYMPHOCYTES # BLD: 2.7 K/UL (ref 0.8–3.5)
LYMPHOCYTES NFR BLD: 23 % (ref 12–49)
MCH RBC QN AUTO: 32.7 PG (ref 26–34)
MCHC RBC AUTO-ENTMCNC: 32.7 G/DL (ref 30–36.5)
MCV RBC AUTO: 99.7 FL (ref 80–99)
MONOCYTES # BLD: 0.6 K/UL (ref 0–1)
MONOCYTES NFR BLD: 5 % (ref 5–13)
NEUTS SEG # BLD: 8.2 K/UL (ref 1.8–8)
NEUTS SEG NFR BLD: 69 % (ref 32–75)
NRBC # BLD: 0 K/UL (ref 0–0.01)
NRBC BLD-RTO: 0 PER 100 WBC
PLATELET # BLD AUTO: 81 K/UL (ref 150–400)
PMV BLD AUTO: 10.1 FL (ref 8.9–12.9)
POTASSIUM SERPL-SCNC: 3.8 MMOL/L (ref 3.5–5.1)
PROT SERPL-MCNC: 3.8 G/DL (ref 6.4–8.2)
PROTHROMBIN TIME: 10.7 SEC (ref 9–11.1)
RBC # BLD AUTO: 3.43 M/UL (ref 3.8–5.2)
SODIUM SERPL-SCNC: 140 MMOL/L (ref 136–145)
WBC # BLD AUTO: 11.9 K/UL (ref 3.6–11)

## 2018-07-23 PROCEDURE — 74011250637 HC RX REV CODE- 250/637: Performed by: INTERNAL MEDICINE

## 2018-07-23 PROCEDURE — 97530 THERAPEUTIC ACTIVITIES: CPT

## 2018-07-23 PROCEDURE — 74011250637 HC RX REV CODE- 250/637: Performed by: FAMILY MEDICINE

## 2018-07-23 PROCEDURE — 74011250636 HC RX REV CODE- 250/636: Performed by: INTERNAL MEDICINE

## 2018-07-23 PROCEDURE — 85025 COMPLETE CBC W/AUTO DIFF WBC: CPT | Performed by: INTERNAL MEDICINE

## 2018-07-23 PROCEDURE — 36415 COLL VENOUS BLD VENIPUNCTURE: CPT | Performed by: STUDENT IN AN ORGANIZED HEALTH CARE EDUCATION/TRAINING PROGRAM

## 2018-07-23 PROCEDURE — 65660000000 HC RM CCU STEPDOWN

## 2018-07-23 PROCEDURE — 74011250637 HC RX REV CODE- 250/637: Performed by: PSYCHIATRY & NEUROLOGY

## 2018-07-23 PROCEDURE — 77030011943

## 2018-07-23 PROCEDURE — 97116 GAIT TRAINING THERAPY: CPT

## 2018-07-23 PROCEDURE — 85610 PROTHROMBIN TIME: CPT | Performed by: STUDENT IN AN ORGANIZED HEALTH CARE EDUCATION/TRAINING PROGRAM

## 2018-07-23 PROCEDURE — 74011250636 HC RX REV CODE- 250/636: Performed by: FAMILY MEDICINE

## 2018-07-23 PROCEDURE — 80053 COMPREHEN METABOLIC PANEL: CPT | Performed by: INTERNAL MEDICINE

## 2018-07-23 PROCEDURE — 97110 THERAPEUTIC EXERCISES: CPT

## 2018-07-23 RX ORDER — KETOROLAC TROMETHAMINE 30 MG/ML
15 INJECTION, SOLUTION INTRAMUSCULAR; INTRAVENOUS ONCE
Status: COMPLETED | OUTPATIENT
Start: 2018-07-23 | End: 2018-07-23

## 2018-07-23 RX ORDER — FLUCONAZOLE 100 MG/1
100 TABLET ORAL DAILY
Status: DISCONTINUED | OUTPATIENT
Start: 2018-07-23 | End: 2018-07-25 | Stop reason: HOSPADM

## 2018-07-23 RX ORDER — PROMETHAZINE HYDROCHLORIDE 25 MG/1
25 TABLET ORAL
Status: DISCONTINUED | OUTPATIENT
Start: 2018-07-23 | End: 2018-07-25 | Stop reason: HOSPADM

## 2018-07-23 RX ORDER — NALOXONE HYDROCHLORIDE 0.4 MG/ML
0.2 INJECTION, SOLUTION INTRAMUSCULAR; INTRAVENOUS; SUBCUTANEOUS
Status: DISCONTINUED | OUTPATIENT
Start: 2018-07-23 | End: 2018-07-25 | Stop reason: HOSPADM

## 2018-07-23 RX ADMIN — HYDROMORPHONE HYDROCHLORIDE 1 MG: 2 INJECTION, SOLUTION INTRAMUSCULAR; INTRAVENOUS; SUBCUTANEOUS at 06:28

## 2018-07-23 RX ADMIN — NYSTATIN 500000 UNITS: 100000 SUSPENSION ORAL at 08:08

## 2018-07-23 RX ADMIN — Medication 10 ML: at 22:35

## 2018-07-23 RX ADMIN — ZOLPIDEM TARTRATE 5 MG: 5 TABLET ORAL at 22:34

## 2018-07-23 RX ADMIN — ZOLPIDEM TARTRATE 5 MG: 5 TABLET ORAL at 23:42

## 2018-07-23 RX ADMIN — PREGABALIN 200 MG: 100 CAPSULE ORAL at 08:07

## 2018-07-23 RX ADMIN — SENNOSIDES AND DOCUSATE SODIUM 1 TABLET: 8.6; 5 TABLET ORAL at 08:08

## 2018-07-23 RX ADMIN — ENOXAPARIN SODIUM 40 MG: 40 INJECTION SUBCUTANEOUS at 17:10

## 2018-07-23 RX ADMIN — FLUOXETINE 20 MG: 20 CAPSULE ORAL at 21:19

## 2018-07-23 RX ADMIN — HYDROMORPHONE HYDROCHLORIDE 1 MG: 2 INJECTION, SOLUTION INTRAMUSCULAR; INTRAVENOUS; SUBCUTANEOUS at 18:56

## 2018-07-23 RX ADMIN — HYDROMORPHONE HYDROCHLORIDE 1 MG: 2 INJECTION, SOLUTION INTRAMUSCULAR; INTRAVENOUS; SUBCUTANEOUS at 23:42

## 2018-07-23 RX ADMIN — SENNOSIDES AND DOCUSATE SODIUM 1 TABLET: 8.6; 5 TABLET ORAL at 17:09

## 2018-07-23 RX ADMIN — POLYETHYLENE GLYCOL (3350) 17 G: 17 POWDER, FOR SOLUTION ORAL at 08:07

## 2018-07-23 RX ADMIN — HYDROMORPHONE HYDROCHLORIDE 1 MG: 2 INJECTION, SOLUTION INTRAMUSCULAR; INTRAVENOUS; SUBCUTANEOUS at 10:38

## 2018-07-23 RX ADMIN — HYDROMORPHONE HYDROCHLORIDE 2 MG: 2 TABLET ORAL at 03:52

## 2018-07-23 RX ADMIN — PREGABALIN 200 MG: 100 CAPSULE ORAL at 17:09

## 2018-07-23 RX ADMIN — HYDROMORPHONE HYDROCHLORIDE 1 MG: 2 INJECTION, SOLUTION INTRAMUSCULAR; INTRAVENOUS; SUBCUTANEOUS at 02:00

## 2018-07-23 RX ADMIN — HYDROMORPHONE HYDROCHLORIDE 2 MG: 2 TABLET ORAL at 08:08

## 2018-07-23 RX ADMIN — KETOROLAC TROMETHAMINE 15 MG: 30 INJECTION, SOLUTION INTRAMUSCULAR at 22:34

## 2018-07-23 RX ADMIN — PREGABALIN 200 MG: 100 CAPSULE ORAL at 22:34

## 2018-07-23 RX ADMIN — POLYETHYLENE GLYCOL (3350) 17 G: 17 POWDER, FOR SOLUTION ORAL at 17:09

## 2018-07-23 RX ADMIN — NYSTATIN 500000 UNITS: 100000 SUSPENSION ORAL at 12:05

## 2018-07-23 RX ADMIN — FLUCONAZOLE 100 MG: 100 TABLET ORAL at 17:09

## 2018-07-23 RX ADMIN — HYDROMORPHONE HYDROCHLORIDE 2 MG: 2 TABLET ORAL at 21:19

## 2018-07-23 RX ADMIN — Medication 10 ML: at 14:43

## 2018-07-23 RX ADMIN — HYDROMORPHONE HYDROCHLORIDE 2 MG: 2 TABLET ORAL at 17:09

## 2018-07-23 RX ADMIN — PROMETHAZINE HYDROCHLORIDE 25 MG: 25 TABLET ORAL at 15:01

## 2018-07-23 RX ADMIN — Medication 10 ML: at 06:28

## 2018-07-23 RX ADMIN — HYDROMORPHONE HYDROCHLORIDE 2 MG: 2 TABLET ORAL at 12:05

## 2018-07-23 NOTE — PROGRESS NOTES
1730 Straight cathed pt again, removed 700mL. Notified Dr. Arik Bowles who does not want to place a quinonez at this time. 100 [facility] angio to notify of HD cath removal, they will be up.      1115 Straight cath with Gabriel light RN removed 1200mL

## 2018-07-23 NOTE — PROGRESS NOTES
Problem: Self Care Deficits Care Plan (Adult)  Goal: *Acute Goals and Plan of Care (Insert Text)  Occupational Therapy Goals  7/18/2018- weekly re assessment  1. Patient will perform ADLs sitting EOB 5 mins without reports of pain supervision/setup within 7 day(s). 2.  Patient will perform lower body ADLs with minimal assistance/contact guard assist within 7 day(s). 3.  Patient will perform bathing with minimal assistance/contact guard assist within 7 day(s). 4.  Patient will perform toilet transfers with minimal assistance/contact guard assist and least restrictive device within 7 day(s). 5.  Patient will perform all aspects of toileting with supervision/set-up within 7 day(s). 6.  Patient will participate in upper extremity therapeutic exercise/activities to increase independence with ADLs with independence for 5 minutes within 7 day(s). 7.  Patient will verbalize/demonstrate 3/3 back precautions during ADL tasks without cues within 7 days. Initiated 7/10/2018  1. Patient will perform ADLs sitting EOB 5 mins without reports of pain supervision/setup within 7 day(s). progressing  2. Patient will perform lower body ADLs with minimal assistance/contact guard assist within 7 day(s). - progressing  3. Patient will perform bathing with minimal assistance/contact guard assist within 7 day(s). - progressing  4. Patient will perform toilet transfers with minimal assistance/contact guard assist x2 and least restrictive device within 7 day(s). - met  5. Patient will perform all aspects of toileting with supervision/set-up within 7 day(s). - progressing  6. Patient will participate in upper extremity therapeutic exercise/activities to increase independence with ADLs with independence for 5 minutes within 7 day(s). - progressing  7. Patient will verbalize/demonstrate 3/3 back precautions during ADL tasks without cues within 7 days.  - progressing        Occupational Therapy TREATMENT  Patient: Teena Renteria (32 y.o. female)  Date: 7/23/2018  Diagnosis: Left leg weakness  Left leg weakness  epidural blood patch  Neuromyelitis optica spectrum disorder (HCC) Neuromyelitis optica (HCC)  Procedure(s) (LRB):  epidural blood patch (N/A) 12 Days Post-Op  Precautions: Back, Fall (L knee buckling)  Chart, occupational therapy assessment, plan of care, and goals were reviewed. ASSESSMENT:  Patient received supine in bed. She declines OOB activity at this time as she is very nervous regarding upcoming meeting with hospital administration. Patient reports OOB earlier with PT for ambulation. Patient agreeable to UE exercises and OT provided red medium resistance theraband. Patient able to tolerate 10 minutes of UE exercises and noted HR reading between 109-118 throughout. Education provided to increase patient activity during day, OOB to chair if only for short time and performance of UE exercises in chair. Patient verbalized understanding of education provided. She will benefit from continued OT services to increase safety and independence with ADL tasks. Progression toward goals:  [x]       Improving appropriately and progressing toward goals  []       Improving slowly and progressing toward goals  []       Not making progress toward goals and plan of care will be adjusted     PLAN:  Patient continues to benefit from skilled intervention to address the above impairments. Continue treatment per established plan of care. Discharge Recommendations:  Rehab  Further Equipment Recommendations for Discharge:  TBD     SUBJECTIVE:   Patient stated Lani Martinez is just not a good day.     OBJECTIVE DATA SUMMARY:   Cognitive/Behavioral Status:  Neurologic State: Alert  Orientation Level: Oriented X4  Cognition: Appropriate decision making; Follows commands  Perception: Appears intact  Perseveration: No perseveration noted  Safety/Judgement: Insight into deficits    Functional Mobility and Transfers for ADLs:  Bed Mobility:  Scooting: Supervision (scooting up in bed)    Transfers:       not tested       Balance:       ADL Intervention:              Cognitive Retraining  Safety/Judgement: Insight into deficits    Neuro Re-Education:           Therapeutic Exercises:   UE exercises, performed with medium resistance theraband. Patient performed 10 reps of the following:  Shoulder flexion  Horizontal abduction  tricep extension    Education provided to improve technique and continue performance of UE tasks. Pain:  Pain Scale 1: Numeric (0 - 10)  Pain Intensity 1: 10              Activity Tolerance:   VSS, decreased activity tolerance   Please refer to the flowsheet for vital signs taken during this treatment.   After treatment:   [] Patient left in no apparent distress sitting up in chair  [x] Patient left in no apparent distress in bed  [x] Call bell left within reach  [x] Nursing notified  [x] Caregiver present  [] Bed alarm activated    COMMUNICATION/COLLABORATION:   The patients plan of care was discussed with: Physical Therapist and Registered Nurse    Charu Travis, OTR/L  Time Calculation: 15 mins

## 2018-07-23 NOTE — PROGRESS NOTES
Bedside shift change report given to KULWANT Robertson (oncoming nurse) by Brooke Castaneda (offgoing nurse). Report included the following information SBAR, Kardex, Procedure Summary, Intake/Output and MAR.

## 2018-07-23 NOTE — PROGRESS NOTES
7/23/2018   CARE MANAGEMENT NOTE:  CM reviewed EMR for clinical updates. Per MD, pt is refusing transfer to [facility] and also refusing rehab. [facility] has denied pt's admission because of being too high level and not having a rehab dx. Neuro and nephology have signed off as there is nothing more to treat inpt. CM will discuss further options and plan at [facility].   Olamide

## 2018-07-23 NOTE — ROUTINE PROCESS
Bedside shift change report given to [address] [place] [address] [address] (oncoming nurse) by Chrissy Harris (offgoing nurse). Report included the following information SBAR, Kardex, MAR and Recent Results.

## 2018-07-23 NOTE — PROGRESS NOTES
Problem: Mobility Impaired (Adult and Pediatric)  Goal: *Acute Goals and Plan of Care (Insert Text)  Physical Therapy Goals  Initiated 7/10/2018 Re-evaluation 7/19/2018 - no goals achieved - all goals remain appropriate  1. Patient will move from supine to sit and sit to supine  in bed with modified independence within 7 day(s). 2.  Patient will transfer from bed to chair and chair to bed with modified independence using the least restrictive device within 7 day(s). 3.  Patient will perform sit to stand with supervision/set-up within 7 day(s). 4.  Patient will ambulate with minimal assistance/contact guard assist for 150 feet with the least restrictive device within 7 day(s). 5.  Patient will ascend/descend 4 stairs with 2 handrail(s) with minimal assistance/contact guard assist within 7 day(s). physical Therapy TREATMENT  Patient: Rosalind Drummond (62 y.o. female)  Date: 7/23/2018  Diagnosis: Left leg weakness  Left leg weakness  epidural blood patch  Neuromyelitis optica spectrum disorder (HCC) [facility]  Procedure(s) (LRB):  epidural blood patch (N/A) 12 Days Post-Op  Precautions: Back, Fall (L knee buckling)  Chart, physical therapy assessment, plan of care and goals were reviewed. ASSESSMENT:  Pt reporting increased fatigue this afternoon. Pt continues with complaints  of pain and discomfort  to back. Pt rolling with good technique CGA. Pt CGA to stand. Pt ambulated 30ft with RW slowly CGA . Pt declined increased ambulation secondary to much fatigue. Pt left sitting in chair. Pt generally weaker today. Progress slow. Continue goals. Progression toward goals:  []    Improving appropriately and progressing toward goals  []    Improving slowly and progressing toward goals  []    Not making progress toward goals and plan of care will be adjusted     PLAN:  Patient continues to benefit from skilled intervention to address the above impairments.   Continue treatment per established plan of care.  Discharge Recommendations:  Rehab  Further Equipment Recommendations for Discharge:  TBD     SUBJECTIVE:       OBJECTIVE DATA SUMMARY:   Critical Behavior:  Neurologic State: Alert  Orientation Level: Oriented X4  Cognition: Appropriate decision making, Follows commands  Safety/Judgement: Insight into deficits  Functional Mobility Training:  Bed Mobility:     Supine to Sit: Contact guard assistance  Sit to Supine: Contact guard assistance  Scooting: Supervision (scooting up in bed)        Transfers:  Sit to Stand: Contact guard assistance  Stand to Sit: Contact guard assistance                             Balance:  Sitting: Intact  Standing: Intact; With support  Standing - Static: Fair;Good  Ambulation/Gait Training:  Distance (ft): 30 Feet (ft)  Assistive Device: Walker, rolling;Gait belt  Ambulation - Level of Assistance: Contact guard assistance        Gait Abnormalities: Decreased step clearance        Base of Support: Narrowed        Step Length: Left shortened;Right shortened                Pain:  Pain Scale 1: Numeric (0 - 10)  Pain Intensity 1: 10              Activity Tolerance:   Pt tolerated treatment fair  Please refer to the flowsheet for vital signs taken during this treatment.   After treatment:   [x]    Patient left in no apparent distress sitting up in chair  []    Patient left in no apparent distress in bed  []    Call bell left within reach  []    Nursing notified  []    Caregiver present  []    Bed alarm activated    COMMUNICATION/COLLABORATION:   The patients plan of care was discussed with: Physical Therapist    Damion Robbins PTA   Time Calculation: 23 mins

## 2018-07-23 NOTE — ROUTINE PROCESS
Temporary dialysis ports aspirated and positive for blood return-heparin removed from both ports. Right IJ temporary dialysis catheter cleansed with chlorahexadine. Sutures removed using sterile technique. Pt able to follow instruction for valsalva maneuver upon removal. Dialysis catheter removed and manual pressure held x 10 mins with quick clot by Michelle Pal RN. Hemostasis at site. Pt instructed to remain upright for next few hours and to alert bedside nurse with bleeding. Dressing to stay intact for next 24 hours. Pt and father express understanding. Bedside RN updated.

## 2018-07-23 NOTE — PROGRESS NOTES
[facility] [place] [facility] 
[address] [facility], [address] [place] [address] 
[phone] Medical Progress Note NAME:         Lamont Hanson :        1990 MRM:        [id number] Date:          2018 Subjective: Patient has been seen and examined as a follow up for multiple medical issues. Chart, labs, diagnostics reviewed. She says she still has lower extremity weakness and now with urinary retention. Her back pain remains moderate, worse with movement. She also has some dysphagia from thrush Objective: 
 
Vital Signs: 
 
Visit Vitals  /77 (BP 1 Location: Right arm, BP Patient Position: At rest)  Pulse (!) 110  Temp 98.2 °F (36.8 °C)  Resp 18  Ht 5' 6\" (1.676 m)  Wt 119.6 kg (263 lb 10.7 oz)  SpO2 97%  Breastfeeding No  
 BMI 42.56 kg/m2 Intake/Output Summary (Last 24 hours) at 18 1640 Last data filed at 18 1433 Gross per 24 hour Intake                0 ml Output             2550 ml Net            -2550 ml Physical Examination: 
 
General:   Well looking and nourished patient in no acute distress Eyes:   pink conjunctivae, PERRLA with no discharge. ENT:   no ottorrhea or rhinorrhea with dry mucous membranes. Throat erythematous Neck: no masses, thyroid non-tender and trachea central. 
Pulm:  no accessory muscle use, clear breath sounds without crackles or wheezes Card:  no JVD or murmurs, has sinus tachycardia, without thrills, bruits or peripheral edema Abd:  Soft, non-tender, non-distended, normoactive bowel sounds with no palpable organomegaly Musc:  No cyanosis, clubbing, atrophy or deformities. Back discomfort Skin:  No rashes, bruising or ulcers. Neuro: Awake and alert. Altered sensation both lower extremities but no definite level. Decreased power both extremities 4/5 bilaterally. She follows commands.   
Psych:  Has a good insight and is oriented x 3 Current Facility-Administered Medications Medication Dose Route Frequency  promethazine (PHENERGAN) tablet 25 mg  25 mg Oral Q6H PRN  
 nystatin (MYCOSTATIN) 100,000 unit/mL oral suspension 500,000 Units  500,000 Units Oral QID  pregabalin (LYRICA) capsule 200 mg  200 mg Oral TID  
 HYDROmorphone (PF) (DILAUDID) injection 1 mg  1 mg IntraVENous Q4H PRN  
 HYDROmorphone (DILAUDID) tablet 2 mg  2 mg Oral Q4H PRN  
 calcium gluconate 1 g in 0.9% sodium chloride 100 mL IVPB  1 g IntraVENous PRN  
 0.9% sodium chloride infusion 2,000 mL  2,000 mL IntraVENous EVERY OTHER DAY  anticoagulant citrate dextrose (ACD-A) solution  1,000 mL In Vitro EVERY OTHER DAY  lidocaine (XYLOCAINE) 10 mg/mL (1 %) injection 20 mL  20 mL SubCUTAneous Multiple  diphenhydrAMINE (BENADRYL) capsule 50 mg  50 mg Oral Q6H PRN  
 sorbitol 70 % solution 30 mL  30 mL Oral DAILY PRN  
 zolpidem (AMBIEN) tablet 5 mg  5 mg Oral QHS PRN  
 senna-docusate (PERICOLACE) 8.6-50 mg per tablet 1 Tab  1 Tab Oral BID  zolpidem (AMBIEN) tablet 5 mg  5 mg Oral QHS  FLUoxetine (PROzac) capsule 20 mg  20 mg Oral QHS  sodium chloride (NS) flush 5-10 mL  5-10 mL IntraVENous Q8H  
 sodium chloride (NS) flush 5-10 mL  5-10 mL IntraVENous PRN  
 acetaminophen (TYLENOL) tablet 650 mg  650 mg Oral Q4H PRN  
 enoxaparin (LOVENOX) injection 40 mg  40 mg SubCUTAneous Q24H  polyethylene glycol (MIRALAX) packet 17 g  17 g Oral BID Laboratory data and review: 
 
Recent Labs  
   07/23/18 
 0421  07/22/18 
 0228  07/21/18 
 0308 WBC  11.9*  11.7*  16.8* HGB  11.2*  11.3*  12.6 HCT  34.2*  35.0  39.3 PLT  81*  103*  111* Recent Labs  
   07/23/18 
 0421  07/22/18 
 0228  07/21/18 
 0308 NA  140  139  135* K  3.8  4.3  4.2 CL  108  106  105 CO2  25  28  24 GLU  119*  90  114* BUN  3*  4*  6  
CREA  0.77  0.71  0.76  
CA  7.8*  8.2*  8.0*  
MG   --    --   1.9 PHOS   --    --   3.7 ALB 2. 6*  2.8*   --   
SGOT  28  63*   --   
ALT  62  146*   --   
INR  1.1  1.0  1.1 No components found for: [place] Other Diagnostics: 
 
Telemetry reviewed: sinus tachycardia I have reviewed all radiological studies done todate Assessment and Plan: ?Neuromyelitis optica spectrum disorder (HCC) (7/14/2018)/ left leg weakness and numbness/ lower back pain/ urinary retention: she has been reviewed by neurology and has had extensive work up. LP with minimal RBCs (not suggestive of SAH) and a normal WBCs, Protein, Glucose, Gram stain, HSV PCR). MS Panel was negative. 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 MRIs Brain, Cervical, Thoracic, Lumbar spine with contrast were neg. In light of her initial clinical symptoms of transverse myelitis she was treated with IV solumedrol as well as plasmapharesis x 5 doses given every other day through 7/22. Patient and family feel these therapies have made no difference and if anything, she seems worse to them. Had an extensive discussion with Dr Iliana Diaz and family. We have done everything within the scope of [facility] and in light of concern for her persistent symptoms, will make arrangements for transfer to [facility] for further review and management. This will be arranged in the AM. In the meantime, continue current pain management, PT, OT and follow clinically.  
   
Urinary retention: this is new and now requiring intermittent straight cath as needed. Perhaps related to primary issue. Plan as above 
   
Thrombocytopenia / leukocytosis: may be related to plasmapheresis. She remains afebrile. Continue to monitor   
   
Oral thrush: likely in the setting of recent steroid use. She says she has dysphagia. Will change Nystatin swish and swallow to fluconazole and follow 
   
Seizures: this is by hx. Not on any AEDs. Neurology following 
   
Nonintractable migraine: post- LP HA. This has now resolved after blood patch. 
   
Obesity (BMI 30-39. 9): would benefit from weight loss and dietary / lifestyle modifications 
   
Constipation: cont current bowel regimen. KUB unremarkable. Had BMs 
   
LE edema: mild. No DVT on dopplers 
  
Transaminitis: mild and has resolved  
  
Sinus tachycardia. Likely from stress and pain. Recent TSH is normal. Monitor Total time spent for the patient's care: 35 Minutes Care Plan discussed with: Patient, Family, Nursing Staff and Floor charge nurse, patient advocacy and Dr Barb Duran Discussed:  Care Plan and D/C Planning Prophylaxis:  Lovenox Anticipated Disposition:  likely transfer to [facility] in AM 
        
___________________________________________________ Attending Physician:   Rolly Acosta MD

## 2018-07-23 NOTE — PROGRESS NOTES
Problem: Falls - Risk of  Goal: *Absence of Falls  Document Awa Fall Risk and appropriate interventions in the flowsheet.    Outcome: Progressing Towards Goal  Fall Risk Interventions:  Mobility Interventions: Bed/chair exit alarm, Patient to call before getting OOB, Utilize walker, cane, or other assistive device, Utilize gait belt for transfers/ambulation         Medication Interventions: Bed/chair exit alarm, Patient to call before getting OOB, Teach patient to arise slowly, Utilize gait belt for transfers/ambulation    Elimination Interventions: Bed/chair exit alarm, Call light in reach, Patient to call for help with toileting needs    History of Falls Interventions: Bed/chair exit alarm, Utilize gait belt for transfer/ambulation

## 2018-07-24 VITALS
SYSTOLIC BLOOD PRESSURE: 129 MMHG | HEIGHT: 66 IN | BODY MASS INDEX: 40.28 KG/M2 | WEIGHT: 250.66 LBS | TEMPERATURE: 98.9 F | OXYGEN SATURATION: 96 % | DIASTOLIC BLOOD PRESSURE: 83 MMHG | HEART RATE: 96 BPM | RESPIRATION RATE: 18 BRPM

## 2018-07-24 PROBLEM — R33.9 URINARY RETENTION: Status: ACTIVE | Noted: 2018-07-24

## 2018-07-24 PROBLEM — B37.0 ORAL THRUSH: Status: ACTIVE | Noted: 2018-07-24

## 2018-07-24 PROBLEM — M54.9 SEVERE BACK PAIN: Status: ACTIVE | Noted: 2018-07-24

## 2018-07-24 LAB
ATRIAL RATE: 108 BPM
CALCULATED P AXIS, ECG09: 36 DEGREES
CALCULATED R AXIS, ECG10: 38 DEGREES
CALCULATED T AXIS, ECG11: 2 DEGREES
DIAGNOSIS, 93000: NORMAL
INR PPP: 1 (ref 0.9–1.1)
P-R INTERVAL, ECG05: 134 MS
PROTHROMBIN TIME: 10 SEC (ref 9–11.1)
Q-T INTERVAL, ECG07: 354 MS
QRS DURATION, ECG06: 84 MS
QTC CALCULATION (BEZET), ECG08: 474 MS
VENTRICULAR RATE, ECG03: 108 BPM

## 2018-07-24 PROCEDURE — 74011250637 HC RX REV CODE- 250/637: Performed by: INTERNAL MEDICINE

## 2018-07-24 PROCEDURE — 77030011943

## 2018-07-24 PROCEDURE — 85610 PROTHROMBIN TIME: CPT | Performed by: STUDENT IN AN ORGANIZED HEALTH CARE EDUCATION/TRAINING PROGRAM

## 2018-07-24 PROCEDURE — 36415 COLL VENOUS BLD VENIPUNCTURE: CPT | Performed by: STUDENT IN AN ORGANIZED HEALTH CARE EDUCATION/TRAINING PROGRAM

## 2018-07-24 PROCEDURE — 74011250637 HC RX REV CODE- 250/637: Performed by: PSYCHIATRY & NEUROLOGY

## 2018-07-24 PROCEDURE — 74011250636 HC RX REV CODE- 250/636: Performed by: INTERNAL MEDICINE

## 2018-07-24 RX ADMIN — SENNOSIDES AND DOCUSATE SODIUM 1 TABLET: 8.6; 5 TABLET ORAL at 18:01

## 2018-07-24 RX ADMIN — PREGABALIN 200 MG: 100 CAPSULE ORAL at 09:37

## 2018-07-24 RX ADMIN — HYDROMORPHONE HYDROCHLORIDE 2 MG: 2 TABLET ORAL at 11:27

## 2018-07-24 RX ADMIN — HYDROMORPHONE HYDROCHLORIDE 1 MG: 2 INJECTION, SOLUTION INTRAMUSCULAR; INTRAVENOUS; SUBCUTANEOUS at 13:47

## 2018-07-24 RX ADMIN — POLYETHYLENE GLYCOL (3350) 17 G: 17 POWDER, FOR SOLUTION ORAL at 18:01

## 2018-07-24 RX ADMIN — HYDROMORPHONE HYDROCHLORIDE 1 MG: 2 INJECTION, SOLUTION INTRAMUSCULAR; INTRAVENOUS; SUBCUTANEOUS at 09:37

## 2018-07-24 RX ADMIN — HYDROMORPHONE HYDROCHLORIDE 1 MG: 2 INJECTION, SOLUTION INTRAMUSCULAR; INTRAVENOUS; SUBCUTANEOUS at 05:10

## 2018-07-24 RX ADMIN — POLYETHYLENE GLYCOL (3350) 17 G: 17 POWDER, FOR SOLUTION ORAL at 09:37

## 2018-07-24 RX ADMIN — HYDROMORPHONE HYDROCHLORIDE 2 MG: 2 TABLET ORAL at 02:03

## 2018-07-24 RX ADMIN — HYDROMORPHONE HYDROCHLORIDE 2 MG: 2 TABLET ORAL at 06:50

## 2018-07-24 RX ADMIN — SENNOSIDES AND DOCUSATE SODIUM 1 TABLET: 8.6; 5 TABLET ORAL at 09:37

## 2018-07-24 RX ADMIN — Medication 10 ML: at 05:10

## 2018-07-24 RX ADMIN — HYDROMORPHONE HYDROCHLORIDE 2 MG: 2 TABLET ORAL at 16:02

## 2018-07-24 RX ADMIN — HYDROMORPHONE HYDROCHLORIDE 1 MG: 2 INJECTION, SOLUTION INTRAMUSCULAR; INTRAVENOUS; SUBCUTANEOUS at 19:44

## 2018-07-24 RX ADMIN — FLUCONAZOLE 100 MG: 100 TABLET ORAL at 09:37

## 2018-07-24 RX ADMIN — Medication 10 ML: at 13:49

## 2018-07-24 RX ADMIN — PREGABALIN 200 MG: 100 CAPSULE ORAL at 16:02

## 2018-07-24 NOTE — PROGRESS NOTES
Problem: Falls - Risk of  Goal: *Absence of Falls  Document Awa Fall Risk and appropriate interventions in the flowsheet.    Outcome: Progressing Towards Goal  Fall Risk Interventions:  Mobility Interventions: Patient to call before getting OOB, Utilize walker, cane, or other assistive device, Utilize gait belt for transfers/ambulation    Mentation Interventions: Family/sitter at bedside, Toileting rounds    Medication Interventions: Patient to call before getting OOB, Teach patient to arise slowly, Utilize gait belt for transfers/ambulation    Elimination Interventions: Call light in reach, Patient to call for help with toileting needs    History of Falls Interventions: Utilize gait belt for transfer/ambulation

## 2018-07-24 NOTE — PROGRESS NOTES
Neurology Progress Note    Interval Hx:      Follow up: Acute onset left leg weakness, severe lower back pain, right leg tingling/ paresthesias, urinary retention/incontinence, generalized hyperreflexia. Subjective:  Patient reports that she is not improving. She reports her urinary incontinence/retention has worsened. She feels like her strength is weakening. She is walking physical therapy but not well. She also has not improved in sensation. I was asked by the family and patient to reevaluate her today. Lab review:  So far CSF has returned normal (WBC 4, Protein 38, Gram stain negative to date, HSV PCR negative), other than few hundred RBCs which is likely d/t traumatic tap. MS panel neg. CRP, CBC normal (other than mildly elevated Abs Lymphocytes 3.9). Other labs: ISABELLA, Copper, anti-ds DNA, urine heavy metals, lyme antibodies, SSA, SSB, were all negative.   NMO antibody was positive at >6        Current Facility-Administered Medications:     promethazine (PHENERGAN) tablet 25 mg, 25 mg, Oral, Q6H PRN, Jim Jacob MD, 25 mg at 07/23/18 1501    fluconazole (DIFLUCAN) tablet 100 mg, 100 mg, Oral, DAILY, Jim Jacob MD, 100 mg at 07/23/18 1709    ketorolac (TORADOL) injection 15 mg, 15 mg, IntraVENous, ONCE, Jim Jacob MD    naloxone [facility]) injection 0.2 mg, 0.2 mg, IntraVENous, EVERY 2 MINUTES AS NEEDED, Jonas Garcia MD    pregabalin (LYRICA) capsule 200 mg, 200 mg, Oral, TID, Charles Bolanos MD, 200 mg at 07/23/18 1709    HYDROmorphone (PF) (DILAUDID) injection 1 mg, 1 mg, IntraVENous, Q4H PRN, Jonas Garcia MD, 1 mg at 07/23/18 1856    HYDROmorphone (DILAUDID) tablet 2 mg, 2 mg, Oral, Q4H PRN, Jonas Garcia MD, 2 mg at 07/23/18 2119    calcium gluconate 1 g in 0.9% sodium chloride 100 mL IVPB, 1 g, IntraVENous, PRN, Lisa Moreno MD    0.9% sodium chloride infusion 2,000 mL, 2,000 mL, IntraVENous, EVERY OTHER DAY, Lisa Moreno MD, Stopped at 07/20/18 1500    anticoagulant citrate dextrose (ACD-A) solution, 1,000 mL, In Vitro, EVERY OTHER DAY, Will Ann MD, 1,000 mL at 07/22/18 1128    lidocaine (XYLOCAINE) 10 mg/mL (1 %) injection 20 mL, 20 mL, SubCUTAneous, Multiple, Raiza Malagon MD    diphenhydrAMINE (BENADRYL) capsule 50 mg, 50 mg, Oral, Q6H PRN, Cathy Ocampo MD, 50 mg at 07/22/18 0831    sorbitol 70 % solution 30 mL, 30 mL, Oral, DAILY PRN, Jayjay Bradley MD, 30 mL at 07/16/18 1007    zolpidem (AMBIEN) tablet 5 mg, 5 mg, Oral, QHS PRN, Cathy Ocampo MD, 5 mg at 07/22/18 2349    senna-docusate (PERICOLACE) 8.6-50 mg per tablet 1 Tab, 1 Tab, Oral, BID, Cathy Ocampo MD, 1 Tab at 07/23/18 1709    zolpidem (AMBIEN) tablet 5 mg, 5 mg, Oral, QHS, Cathy Ocampo MD, 5 mg at 07/22/18 2200    FLUoxetine (PROzac) capsule 20 mg, 20 mg, Oral, QHS, Cathy Ocampo MD, 20 mg at 07/23/18 2119    sodium chloride (NS) flush 5-10 mL, 5-10 mL, IntraVENous, Q8H, Maurice Díaz MD, 10 mL at 07/23/18 1443    sodium chloride (NS) flush 5-10 mL, 5-10 mL, IntraVENous, PRN, Maurice Díaz MD, 10 mL at 07/21/18 0400    acetaminophen (TYLENOL) tablet 650 mg, 650 mg, Oral, Q4H PRN, Maurice Díaz MD, 650 mg at 07/10/18 0658    enoxaparin (LOVENOX) injection 40 mg, 40 mg, SubCUTAneous, Q24H, Maurice Díaz MD, 40 mg at 07/23/18 1710    polyethylene glycol (MIRALAX) packet 17 g, 17 g, Oral, BID, Maurice Díaz MD, 17 g at 07/23/18 1709    Physical Exam    Patient Vitals for the past 12 hrs:   Temp Pulse Resp BP SpO2   07/23/18 1945 99.6 °F (37.6 °C) (!) 112 18 (!) 152/91 95 %   07/23/18 1509 98.2 °F (36.8 °C) (!) 110 18 130/77 97 %   07/23/18 1500 - (!) 109 - - -   07/23/18 1232 97.1 °F (36.2 °C) (!) 107 18 - 96 %       Awake, resting in bed, tearful    Focused Neurological Exam   Extraocular movement intact bilaterally  Face appears symmetric  Hearing / Language intact to casual conversation    Sensory:   Left lower ext: absent pinprick/temp to level of T12  Right lower ext: intact LT, prick dec to mid thigh    Motor:   5/5 strength both arms  4/5 right leg  Left leg with at least 4+/5 strength distally, 4/5 proximally in hip  Gait: not tested for supposedly patient has been able to use a walker with physical therapy      Impression/ Plan    34 y.o. female with   Acute onset left leg weakness, severe lower back pain, right leg tingling/ paresthesias, urinary retention, generalized hyperreflexia    Repeat MRIs Brain, Cervical, Thoracic, Lumbar spine  (all with contrast) have not shown etiology (specifically no cord or CNS lesions, no spinal stenosis, no disc herniation). This was with a 1.5T scanner and patient may have better eval with 3T machine. LP with minimal RBCs (not suggestive of SAH) and no evidence of infection or inflammation (normal WBCs, Protein, Glucose, Gram stain, HSV PCR). MS Panel was negative. However NMO was positive. 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 Pt has completed IV solumedrol and plasmapheresis ×5. Cont Lyrica to 200mg TID. Patient continues to report that her pain is never gone lower than 8/10 despite being given IV Dilaudid. Bowels improving. Will monitor    Will cont to monitor heart rate with remote telemetry. Patient has had tachycardia which is thought to be secondary to her pain    Leg swelling: no improvement despite good response to lasix    Urinary retention: Patient reports that she is having to be straight cathed frequently now      Today I met with the family, patient, patient advocate, nursing manager, and hospitalist Dr. Miracle Pak. The family had written a letter to administration with concerns about the patient's hospital care. They requested that I reevaluate the patient today.   We reviewed the patient's diagnosis, testing that has been completed, treatment that has been completed, and the risks and benefits of doing additional treatments without complete accuracy of the diagnosis of NMO. Specifically, family was concerned regarding what possible additional testing could be done within our health system as opposed to transferring the patient to [facility]. We did again discuss that I would like her to be seen by an MS specialist which cannot be provided in our health system at this time. Additionally we reviewed that an [facility] center will have other testing available that we do not have in our [facility] hospital.  They were concerned about the insurance ramifications of the patient transferring. I did express that my concern was that we had nothing more to offer her and that our best opportunity for diagnosis would be a tertiary center. Family also expressed concern that if the treatment team was concerned that she may have some anxiety/stress, then why was psychiatry not involved in the case. We did discuss that prior to my coming on to the case the patient had strongly disagreed with the neurologist prior to me that had suggested that anxiety/stress could be a component. Therefore, it was felt that patient would not be agreeable to psychiatric evaluation at this time. Patient and family feel that she is not improving and is not able to go to rehab. I did discuss with them that I think clinically her strength has improved over her baseline when I first started caring for her, but that subjectively her pain and lack of sensation are likely contributing to her feeling that she is not improving. Together with the family the treatment team and I have recommended to transfer the patient to [facility] if she is accepted. Signed By: Tami Cancino MD     7/23/2018     Over 80 minutes was spent both on the floor and in face-to-face time with the patient and family.   Time was spent reviewing records, discussing with Dr. Gerhard Ware, nursing, and patient advocate, obtaining history, examining patient and discussing treatment plans.

## 2018-07-24 NOTE — PROGRESS NOTES
7/24/2018   CARE MANAGEMENT NOTE:  CM reviewed EMR especially Neurologist note dated 7/23 for summary of pt's hospital course and treatment options and recommendations. This a.m. CM spoke with attending and reportedly pt/family are in agreement with transfer to [facility]. Pt has been accepted and bed is pending. Ambulance transport to [facility] will be required based on EMTALA. [facility] may be arranged by calling [phone]. The unit secretary has copied the chart and CDs were obtained to accompany pt. This CM has only overseen discharge planning but no contact with pt was made directly.   Olamide

## 2018-07-24 NOTE — DISCHARGE SUMMARY
Gabino Higuera [facility] 79  [address]  Tel: [phone]    Physician Discharge Summary    Patient ID:    Melissa Leyva  Age:              34 y.o.    : 1990  MRN:             [id number]     PCP: Ramila Monge NP     Admit date: 2018    Discharge date: 2018    Principal admission Diagnosis:  Left leg weakness  Back pain    Discharge Diagnoses: Active Problems:    ?Neuromyelitis optica spectrum disorder ([facility] [place] [address] (2018)    Urinary retention (2018)    Severe back pain (2018)    Oral thrush (2018)    Left leg weakness (2018)    Numbness in left leg (2018)    Seizures (HCC) ()    Nonintractable migraine (2018)    Urinary incontinence (2018)    Obesity (BMI 30-39.9) (2018)    Consults: Nephrology, Neurology and Zechariah Altamirano 1137 Course:     Ms. Brendan Acuña is a 34 y.o. admitted to [facility] and treated for the following:    ?Neuromyelitis optica spectrum disorder ([facility] [place] [address] (2018)/ left leg weakness and numbness/ lower back pain/ urinary retention: she has been reviewed by neurology and has had extensive work up. LP with minimal RBCs (not suggestive of SAH) and a normal WBCs, Protein, Glucose, Gram stain, HSV PCR). MS Panel was negative. ISABELLA, Copper, anti-ds DNA, urine heavy metals, lyme antibodies, SSA, SSB, were all negative. 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 MRIs Brain, Cervical, Thoracic, Lumbar spine with contrast were neg. In light of her initial clinical symptoms of transverse myelitis she was treated with IV solumedrol as well as plasmapharesis x 5 doses given every other day through . Patient and family feel these therapies have made no difference and if anything, she seems worse to them.  After discussions with Dr Leticia Spears and family, we have exhausted therapeutic options available at [address] [place] [address] and she will be transferred to [facility] of [address] [place] [address] for further review and management. I have discussed with Dr Sai Emanuel who has kindly accepted transfer. Continue pain management, PT, OT while here as tolerated.       Urinary retention: this is new and now requiring intermittent straight cath as needed. She had presented with urinary incontinence. Follow clinically       Thrombocytopenia / leukocytosis: may be related to plasmapheresis. She remains afebrile. Continue to monitor        Oral thrush: likely in the setting of recent steroid use. She had some dysphagia. Will change Nystatin swish and swallow to fluconazole started 7/23.       Seizures: this is by hx. Not on any AEDs. She has been reviewedg      Nonintractable migraine: post- LP HA. This has now resolved after blood patch.      Obesity (BMI 30-39. 9): would benefit from weight loss and dietary / lifestyle modifications      Constipation: cont current bowel regimen. KUB unremarkable. This is better.       LE edema: mild. No DVT on dopplers      Transaminitis: mild and has resolved       Sinus tachycardia. Likely from stress and pain. Recent TSH is normal. Now resolved. Discharge Exam:    Visit Vitals    /90 (BP 1 Location: Right arm, BP Patient Position: At rest)    Pulse 98    Temp 98 °F (36.7 °C)    Resp 18    Ht 5' 6\" (1.676 m)    Wt 113.7 kg (250 lb 10.6 oz)    SpO2 97%    Breastfeeding No    BMI 40.46 kg/m2      General: well looking and stable patient in no acute distress  Pulm: clear breath sounds without wheezes  Card: no murmurs, normal S1, S2 without thrills, bruits   Abd:    soft, non-tender, normoactive bowel sounds  Skin: no rashes or ulcers, skin turgor is good  Neuro: awake, alert. Has altered sensation at around T12 distally. Much worse left lower extremity. Decreased power both extremities 4/5 bilaterally. She follows commands.    Psych:  Has a good insight and is oriented x 3    Activity: Activity as tolerated    Diet: Regular Diet    Current Transfer Medications: see current MAR    Follow-up tests or labs: None    Discharge Condition: Stable    Disposition: [facility]    Time taken to arrange discharge:  35 minutes.     Signed:  Beth Young MD     [facility]  7/24/2018   9:38 AM

## 2018-07-24 NOTE — PROGRESS NOTES
Occupational therapy note: Attempted to see patient this morning for OT weekly re assessment. Patient father spoke with OT outside of room, expressed frustrations regarding current situation and reporting patient plan for discharge to [facility]. Patient father holding bathing wipes in hand in addition to clean pad for bed. He reports patient to briefly perform hygiene task at bed level and rest as he continues to pack belongings in anticipation of transfer. Will follow patient for continued OT services if she remains. Kacie Romero MS OTR/L

## 2018-07-24 NOTE — ROUTINE PROCESS
Bedside shift change report given to [address] [place] [address] 51 S (oncoming nurse) by Hilda Murry (offgoing nurse). Report included the following information SBAR, Kardex, MAR and Recent Results.

## 2018-07-24 NOTE — ROUTINE PROCESS
Bedside shift change report given to Felice (oncoming nurse) by Raphael Borden  (offgoing nurse). Report included the following information SBAR, MAR and Recent Results.

## 2018-07-24 NOTE — ROUTINE PROCESS
Called report to Joel Laws at [facility]. The patient will go to [address] [place] [address] 6138 Bed . [facility] transport is set up for 7:30pm today. Notified the patient and Dr. Alfredo Bailon. [address] [facility] here to  patient.

## 2019-06-10 NOTE — PROGRESS NOTES
Occupational therapy note: Attempted to see patient of OT treatment this morning. Patient requesting pain medication and RN in room to administer. Patient requests OT return at later time for treatment. Second attempt to see patient, and RN exiting room reporting patient in pain and in need of medication. Will follow up with patient at later time for OT weekly re-assessment. Kacie Romero, MS OTR/L negative...

## 2019-08-23 NOTE — PROGRESS NOTES
Pt has Ambien 5mg scheduled for bedtime and also has Ambien 5mg scheduled prn bedtime. Pt requested Ambien 10mg reports that 5mg is not effective. Called spoke with Dr. Dae Zafar, clarified dosage. Per Dr. Dae Zafar pt may have Ambien 10 mg.  2100  Pt's redness from scratching on arms and chest are resolving/clearing up now. Monocytes % 8.5 2.0 - 12.0 %    Eosinophils % 2.8 0.0 - 6.0 %    Basophils % 0.8 0.0 - 2.0 %    Neutrophils Absolute 3.94 1.80 - 7.30 E9/L    Immature Granulocytes # 0.01 E9/L    Lymphocytes Absolute 3.05 1.50 - 4.00 E9/L    Monocytes Absolute 0.68 0.10 - 0.95 E9/L    Eosinophils Absolute 0.22 0.05 - 0.50 E9/L    Basophils Absolute 0.06 0.00 - 0.20 E9/L   Comprehensive Metabolic Panel w/ Reflex to MG   Result Value Ref Range    Sodium 142 132 - 146 mmol/L    Potassium reflex Magnesium 4.2 3.5 - 5.0 mmol/L    Chloride 104 98 - 107 mmol/L    CO2 30 (H) 22 - 29 mmol/L    Anion Gap 8 7 - 16 mmol/L    Glucose 111 (H) 74 - 99 mg/dL    BUN 25 (H) 8 - 23 mg/dL    CREATININE 1.1 (H) 0.5 - 1.0 mg/dL    GFR Non-African American 47 >=60 mL/min/1.73    GFR African American 57     Calcium 10.1 8.6 - 10.2 mg/dL    Total Protein 6.3 (L) 6.4 - 8.3 g/dL    Alb 3.6 3.5 - 5.2 g/dL    Total Bilirubin 1.5 (H) 0.0 - 1.2 mg/dL    Alkaline Phosphatase 105 (H) 35 - 104 U/L    ALT 17 0 - 32 U/L    AST 25 0 - 31 U/L   POCT glucose   Result Value Ref Range    Glucose 120 mg/dL    QC OK? yes. POCT Glucose   Result Value Ref Range    Meter Glucose 120 (H) 74 - 99 mg/dL   EKG 12 Lead   Result Value Ref Range    Ventricular Rate 59 BPM    Atrial Rate 500 BPM    QRS Duration 100 ms    Q-T Interval 434 ms    QTc Calculation (Bazett) 429 ms    R Axis 17 degrees    T Axis 78 degrees       Radiology:  No orders to display       ------------------------- NURSING NOTES AND VITALS REVIEWED ---------------------------  Date / Time Roomed:  8/23/2019  2:50 PM  ED Bed Assignment:  13/13    The nursing notes within the ED encounter and vital signs as below have been reviewed.    BP (!) 161/84   Pulse 69   Temp 96.5 °F (35.8 °C) (Rectal)   Resp 16   Ht 5' 7\" (1.702 m)   Wt 125 lb (56.7 kg)   SpO2 96%   BMI 19.58 kg/m²   Oxygen Saturation Interpretation: Normal      ------------------------------------------ PROGRESS NOTES ------------------------------------------  8:11 PM  I have spoken with the daughter and discussed todays results, in addition to providing specific details for the plan of care and counseling regarding the diagnosis and prognosis. Their questions are answered at this time and they are agreeable with the plan. I discussed at length with them reasons for immediate return here for re evaluation. They will followup with their gastroenterology and primary care physician by calling their office on Monday.      --------------------------------- ADDITIONAL PROVIDER NOTES ---------------------------------  At this time the patient is without objective evidence of an acute process requiring hospitalization or inpatient management. They have remained hemodynamically stable throughout their entire ED visit and are stable for discharge with outpatient follow-up. The plan has been discussed in detail and they are aware of the specific conditions for emergent return, as well as the importance of follow-up. New Prescriptions    No medications on file       Diagnosis:  1. Medication side effect, initial encounter        Disposition:  Patient's disposition: Discharge to home  Patient's condition is stable.            Angi Partida DO  Resident  08/23/19 2014

## 2021-03-10 NOTE — PROGRESS NOTES
Back xray ordered.  Reassess pending results.   Bedside and Verbal shift change report given to Kristie BEAULIEU RN (oncoming nurse) by Rafy Winn (offgoing nurse). Report included the following information SBAR and Kardex.

## 2022-02-15 NOTE — PROGRESS NOTES
Patient:   CALE WHATLEY            MRN: [id number]            FIN: [id number]               Age:   4 years     Sex:  Male     :  2016   Associated Diagnoses:   Well child examination   Author:   Ally Morse MD      Chief Complaint   2020 5:30 PM CST   4 yr well child check      Well Child History   Parent concerns: Here today with mom for 4-year well-child.  Mom has no concerns.    Development: Has around 30 words.  Is starting to add a new word every day.  Just starting to have two-word phrases.  Does not speak quite as much as older brother did at this point.  Great fine motor skills.  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  Mom thinks he can jump with 2 feet.  Loves .  Very social.    Diet: Is still taking whole milk, wonders when to change to 1% or skim.  Has a good appetite.  No concerns about bradycardia.    Sleep: Takes 1 nap per day.  Sleeps all through the night.  Naps usually are better at  than at home or at grandparents house.      Review of Systems   Constitutional:  Negative.    Eye:  Negative.    Ear/Nose/Mouth/Throat:  Negative.    Respiratory:  Older brother has a cough.  Mom did give Cale a nebulizer the other night due to some wheezing..    Cardiovascular:  Negative.    Gastrointestinal:  Negative.    Genitourinary:  Negative.    Musculoskeletal:  Negative.    Integumentary:  Negative.       Health Status   Allergies:    Allergic Reactions (Selected)  No Known Medication Allergies   Medications:  (Selected)   Prescriptions  Prescribed  albuterol 2.5 mg/3 mL (0.083%) inhalation solution: = 3 mL ( 2.5 mg ), Inhale, q6 hrs, # 120 EA, 0 Refill(s), Type: Maintenance, Pharmacy: [facility] #[id number], 3 mL Inhale q6 hrs  betamethasone dipropionate 0.05% topical ointment: 1 domi, Topical, bid, # 15 gm, 1 Refill(s), Type: Soft Stop, Pharmacy: [facility] #[id number]   Problem list:    All Problems  History of RSV infection / [id number] /  Another CM received request from MD to discuss inpatient rehab with patient. This MSW Met with the patient and her father Elena Carpenter, [phone] to discuss option of rehab. Patient not against going to inpt rehab but does not want to pursue this without knowing what the problem is that is causing her to have left leg numbness, back pain and incontinence. She expressed frustration with not knowing what is causing the issues and \"does not want to be shipped out of here\" without this being explored futher and being figured out. Notified MD of this that patient request to discuss with him. Emelina from [facility] has been working with this patient regarding complaints. 4:40 PM  Receive message from MD that he will see patient tomorrow but to start the rehab process. MSW met with the patient again and explained rehab referral process. Again she stated she is not adverse to going to [facility] but wants to know what is causing her symptoms before going to [facility]. Clemente Gay She agreed for MSW to make referral and signed a Choice letter. Referral made.     LENORE Andre   Confirmed      Histories   Past Medical History:    No active or resolved past medical history items have been selected or recorded.   Family History:    No family history items have been selected or recorded.   Procedure history:    Circumcision ([id number]) on 2018 at 2 Days.   Social History:        Tobacco Assessment            Household tobacco concerns: No.        Physical Examination   Vital Signs   11/24/2020 5:30 PM CST Temperature Tympanic 98.8 DegF    Peripheral Pulse Rate 112 bpm  HI    HR Method Manual      Measurements from flowsheet : Measurements   11/24/2020 5:30 PM CST Height Measured - Metric 89.41 cm    Height/Length Z-score 0.73    Weight Measured - Metric 16.5 kg    Weight Percentile 98.99    Weight Z-score 2.32    BSA - Metric 0.64 m2    Body Mass Index - Metric 20.64 kg/m2    Body Mass Index Percentile 98.72    BMI Z-score 2.23      General:  Alert and oriented, No acute distress.    Eye:  Pupils are equal, round and reactive to light, Extraocular movements are intact, Corneal reflex symmetric, Cover-uncover test shows no eye deviation.  , Positive red reflex bilaterally. .    HENT:  Tympanic membranes are clear, Oral mucosa is moist, No pharyngeal erythema, Good dentition.    Neck:  No lymphadenopathy.    Respiratory:  End expiratory wheezes especially left chest; slight decreased air movement throughout.  No rhonchi or rales.  Equal air entry.  Symmetrical chest expansion.  .    Cardiovascular:  S1 and S2 with regular rate and rhythm.  No murmurs.  Pulses 2+ in all four extremities.  Brisk capillary refill.  .    Gastrointestinal:  Positive bowel sounds in all four quadrants.  Abdomen is soft, non-distended, non-tender.  No hepatosplenomegaly.  .    Genitourinary:  Duane stage 1 and 1.  Testes descended bilaterally.  Circumcised male.  .    Musculoskeletal:  No deformity, Normal gait.    Integumentary:  No rash.    Neurologic:  No focal deficits, Normal deep tendon reflexes.     Psychiatric:  Cooperative.       Review / Management   Results review   Growth charts reviewed with family.   ASQ: Communication 50, gross motor 60, fine motor 50, problem solving 50, personal social 45.         Impression and Plan   Diagnosis     Well child examination ([id number]-YW Z00.129).     Plan:  Anticipatory guidance provided:  Car safety, temperament and behavior, toilet training, Screen time.    Reassured his communication is within normal limits.  Recommend she give him some albuterol when they get home and reminder would be fine to use every 4 hours as needed for coughing or wheezing.  Certainly if he develops fever or other symptoms they should consider returning for Covid testing.  Did have a large localized reaction to flu vaccine this year.  Will need to consider allergy referral for skin testing for next years vaccine.  Return to clinic for 2-1/2-year well-child..

## 2023-03-24 NOTE — PROGRESS NOTES
4211  Bedside and Verbal shift change report given to Jefferson Castorena RN (oncoming nurse) by Kristi Bradley RN (offgoing nurse). Report included the following information SBAR, Kardex, Intake/Output, MAR, Recent Results and Med Rec Status. 2000  Notified from [employer] that pt HR running in the 130s-140s. Pt had been sitting up eating in bed, no complaints at this time. Vitals signs taken. Dr. Denise Carrasquillo made aware. No new orders at this time. Olanzapine Pregnancy And Lactation Text: This medication is pregnancy category C.   There are no adequate and well controlled trials with olanzapine in pregnant females.  Olanzapine should be used during pregnancy only if the potential benefit justifies the potential risk to the fetus.   In a study in lactating healthy women, olanzapine was excreted in breast milk.  It is recommended that women taking olanzapine should not breast feed.
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
